# Patient Record
Sex: FEMALE | Race: WHITE | NOT HISPANIC OR LATINO | Employment: PART TIME | URBAN - METROPOLITAN AREA
[De-identification: names, ages, dates, MRNs, and addresses within clinical notes are randomized per-mention and may not be internally consistent; named-entity substitution may affect disease eponyms.]

---

## 2017-07-06 ENCOUNTER — TRANSCRIBE ORDERS (OUTPATIENT)
Dept: ADMINISTRATIVE | Facility: HOSPITAL | Age: 72
End: 2017-07-06

## 2017-07-06 DIAGNOSIS — Z12.31 ENCOUNTER FOR MAMMOGRAM TO ESTABLISH BASELINE MAMMOGRAM: Primary | ICD-10-CM

## 2017-07-10 ENCOUNTER — HOSPITAL ENCOUNTER (OUTPATIENT)
Dept: MAMMOGRAPHY | Facility: HOSPITAL | Age: 72
Discharge: HOME | End: 2017-07-10
Payer: MEDICARE

## 2017-07-10 DIAGNOSIS — Z12.31 ENCOUNTER FOR MAMMOGRAM TO ESTABLISH BASELINE MAMMOGRAM: ICD-10-CM

## 2017-07-10 PROCEDURE — G0202 SCR MAMMO BI INCL CAD: HCPCS

## 2018-06-21 ENCOUNTER — TRANSCRIBE ORDERS (OUTPATIENT)
Dept: ADMINISTRATIVE | Facility: HOSPITAL | Age: 73
End: 2018-06-21

## 2018-06-21 DIAGNOSIS — Z12.39 SCREENING BREAST EXAMINATION: Primary | ICD-10-CM

## 2018-07-12 ENCOUNTER — HOSPITAL ENCOUNTER (OUTPATIENT)
Dept: MAMMOGRAPHY | Facility: HOSPITAL | Age: 73
Discharge: HOME | End: 2018-07-12
Payer: MEDICARE

## 2018-07-12 DIAGNOSIS — Z12.39 SCREENING BREAST EXAMINATION: ICD-10-CM

## 2018-07-12 PROCEDURE — 77067 SCR MAMMO BI INCL CAD: CPT

## 2018-10-01 ENCOUNTER — APPOINTMENT (OUTPATIENT)
Dept: LAB | Facility: HOSPITAL | Age: 73
End: 2018-10-01
Payer: MEDICARE

## 2018-10-01 ENCOUNTER — TRANSCRIBE ORDERS (OUTPATIENT)
Dept: ADMINISTRATIVE | Facility: HOSPITAL | Age: 73
End: 2018-10-01

## 2018-10-01 DIAGNOSIS — E03.8 HYPOTHYROIDISM DUE TO FIBROUS INVASIVE THYROIDITIS: Primary | ICD-10-CM

## 2018-10-01 DIAGNOSIS — E78.5 HYPERLIPIDEMIA, UNSPECIFIED HYPERLIPIDEMIA TYPE: ICD-10-CM

## 2018-10-01 DIAGNOSIS — I10 ESSENTIAL HYPERTENSION, MALIGNANT: ICD-10-CM

## 2018-10-01 DIAGNOSIS — M54.5 LOW BACK PAIN, UNSPECIFIED BACK PAIN LATERALITY, UNSPECIFIED CHRONICITY, WITH SCIATICA PRESENCE UNSPECIFIED: ICD-10-CM

## 2018-10-01 DIAGNOSIS — F32.A VASCULAR DEMENTIA WITH DEPRESSED MOOD (HCC): ICD-10-CM

## 2018-10-01 DIAGNOSIS — E06.5 HYPOTHYROIDISM DUE TO FIBROUS INVASIVE THYROIDITIS: Primary | ICD-10-CM

## 2018-10-01 DIAGNOSIS — F01.50 VASCULAR DEMENTIA WITH DEPRESSED MOOD (HCC): ICD-10-CM

## 2018-10-01 DIAGNOSIS — E55.9 VITAMIN D DEFICIENCY DISEASE: ICD-10-CM

## 2018-10-01 DIAGNOSIS — E03.8 HYPOTHYROIDISM DUE TO FIBROUS INVASIVE THYROIDITIS: ICD-10-CM

## 2018-10-01 DIAGNOSIS — E06.5 HYPOTHYROIDISM DUE TO FIBROUS INVASIVE THYROIDITIS: ICD-10-CM

## 2018-10-01 LAB
25(OH)D3 SERPL-MCNC: 30.6 NG/ML (ref 30–100)
ALBUMIN SERPL BCP-MCNC: 3.8 G/DL (ref 3–5.2)
ALP SERPL-CCNC: 47 U/L (ref 43–122)
ALT SERPL W P-5'-P-CCNC: 22 U/L (ref 9–52)
ANION GAP SERPL CALCULATED.3IONS-SCNC: 5 MMOL/L (ref 5–14)
AST SERPL W P-5'-P-CCNC: 22 U/L (ref 14–36)
BACTERIA UR QL AUTO: ABNORMAL /HPF
BASOPHILS # BLD AUTO: 0 THOUSANDS/ΜL (ref 0–0.1)
BASOPHILS NFR BLD AUTO: 1 % (ref 0–1)
BILIRUB SERPL-MCNC: 1.3 MG/DL
BILIRUB UR QL STRIP: NEGATIVE
BUN SERPL-MCNC: 26 MG/DL (ref 5–25)
CALCIUM SERPL-MCNC: 8.8 MG/DL (ref 8.4–10.2)
CHLORIDE SERPL-SCNC: 107 MMOL/L (ref 97–108)
CHOLEST SERPL-MCNC: 242 MG/DL
CLARITY UR: ABNORMAL
CO2 SERPL-SCNC: 28 MMOL/L (ref 22–30)
COLOR UR: YELLOW
CREAT SERPL-MCNC: 0.68 MG/DL (ref 0.6–1.2)
EOSINOPHIL # BLD AUTO: 0.2 THOUSAND/ΜL (ref 0–0.4)
EOSINOPHIL NFR BLD AUTO: 4 % (ref 0–6)
ERYTHROCYTE [DISTWIDTH] IN BLOOD BY AUTOMATED COUNT: 13.1 %
GFR SERPL CREATININE-BSD FRML MDRD: 86 ML/MIN/1.73SQ M
GGT SERPL-CCNC: 16 U/L (ref 5–85)
GLUCOSE P FAST SERPL-MCNC: 87 MG/DL (ref 70–99)
GLUCOSE UR STRIP-MCNC: NEGATIVE MG/DL
HCT VFR BLD AUTO: 41.7 % (ref 36–46)
HDLC SERPL-MCNC: 64 MG/DL (ref 40–59)
HGB BLD-MCNC: 13.9 G/DL (ref 12–16)
HGB UR QL STRIP.AUTO: 50
KETONES UR STRIP-MCNC: NEGATIVE MG/DL
LDLC SERPL CALC-MCNC: 161 MG/DL
LEUKOCYTE ESTERASE UR QL STRIP: 500
LYMPHOCYTES # BLD AUTO: 1.7 THOUSANDS/ΜL (ref 0.5–4)
LYMPHOCYTES NFR BLD AUTO: 35 % (ref 20–50)
MCH RBC QN AUTO: 32.5 PG (ref 26–34)
MCHC RBC AUTO-ENTMCNC: 33.3 G/DL (ref 31–36)
MCV RBC AUTO: 98 FL (ref 80–100)
MONOCYTES # BLD AUTO: 0.5 THOUSAND/ΜL (ref 0.2–0.9)
MONOCYTES NFR BLD AUTO: 11 % (ref 1–10)
MUCOUS THREADS UR QL AUTO: ABNORMAL
NEUTROPHILS # BLD AUTO: 2.4 THOUSANDS/ΜL (ref 1.8–7.8)
NEUTS SEG NFR BLD AUTO: 49 % (ref 45–65)
NITRITE UR QL STRIP: NEGATIVE
NON-SQ EPI CELLS URNS QL MICRO: ABNORMAL /HPF
NONHDLC SERPL-MCNC: 178 MG/DL
PH UR STRIP.AUTO: 5 [PH] (ref 4.5–8)
PLATELET # BLD AUTO: 224 THOUSANDS/UL (ref 150–450)
PMV BLD AUTO: 9.5 FL (ref 8.9–12.7)
POTASSIUM SERPL-SCNC: 4.3 MMOL/L (ref 3.6–5)
PROT SERPL-MCNC: 6.7 G/DL (ref 5.9–8.4)
PROT UR STRIP-MCNC: ABNORMAL MG/DL
RBC # BLD AUTO: 4.27 MILLION/UL (ref 4–5.2)
RBC #/AREA URNS AUTO: ABNORMAL /HPF
SODIUM SERPL-SCNC: 140 MMOL/L (ref 137–147)
SP GR UR STRIP.AUTO: 1.02 (ref 1–1.04)
T4 FREE SERPL-MCNC: 1.11 NG/DL (ref 0.76–1.46)
TRIGL SERPL-MCNC: 83 MG/DL
TSH SERPL DL<=0.05 MIU/L-ACNC: 1.51 UIU/ML (ref 0.47–4.68)
UROBILINOGEN UA: NEGATIVE MG/DL
WBC # BLD AUTO: 4.8 THOUSAND/UL (ref 4.5–11)
WBC #/AREA URNS AUTO: ABNORMAL /HPF

## 2018-10-01 PROCEDURE — 85025 COMPLETE CBC W/AUTO DIFF WBC: CPT

## 2018-10-01 PROCEDURE — 84443 ASSAY THYROID STIM HORMONE: CPT

## 2018-10-01 PROCEDURE — 80053 COMPREHEN METABOLIC PANEL: CPT

## 2018-10-01 PROCEDURE — 80061 LIPID PANEL: CPT

## 2018-10-01 PROCEDURE — 84439 ASSAY OF FREE THYROXINE: CPT

## 2018-10-01 PROCEDURE — 82977 ASSAY OF GGT: CPT

## 2018-10-01 PROCEDURE — 82306 VITAMIN D 25 HYDROXY: CPT

## 2018-10-01 PROCEDURE — 36415 COLL VENOUS BLD VENIPUNCTURE: CPT

## 2018-10-01 PROCEDURE — 81001 URINALYSIS AUTO W/SCOPE: CPT

## 2018-10-30 ENCOUNTER — OFFICE VISIT (OUTPATIENT)
Dept: FAMILY MEDICINE CLINIC | Facility: CLINIC | Age: 73
End: 2018-10-30
Payer: MEDICARE

## 2018-10-30 VITALS
TEMPERATURE: 96 F | OXYGEN SATURATION: 95 % | SYSTOLIC BLOOD PRESSURE: 142 MMHG | DIASTOLIC BLOOD PRESSURE: 88 MMHG | RESPIRATION RATE: 15 BRPM | WEIGHT: 156 LBS | HEART RATE: 89 BPM

## 2018-10-30 DIAGNOSIS — Z23 ENCOUNTER FOR IMMUNIZATION: ICD-10-CM

## 2018-10-30 DIAGNOSIS — I10 ESSENTIAL HYPERTENSION: ICD-10-CM

## 2018-10-30 DIAGNOSIS — E03.9 ACQUIRED HYPOTHYROIDISM: ICD-10-CM

## 2018-10-30 DIAGNOSIS — A49.9 BACTERIAL URINARY INFECTION: Primary | ICD-10-CM

## 2018-10-30 DIAGNOSIS — G56.03 BILATERAL CARPAL TUNNEL SYNDROME: ICD-10-CM

## 2018-10-30 DIAGNOSIS — E78.2 MIXED HYPERLIPIDEMIA: ICD-10-CM

## 2018-10-30 DIAGNOSIS — F32.89 OTHER DEPRESSION: ICD-10-CM

## 2018-10-30 DIAGNOSIS — N39.0 BACTERIAL URINARY INFECTION: Primary | ICD-10-CM

## 2018-10-30 LAB
BACTERIA UR QL AUTO: ABNORMAL /HPF
BILIRUB UR QL STRIP: NEGATIVE
CLARITY UR: ABNORMAL
COLOR UR: YELLOW
GLUCOSE UR STRIP-MCNC: NEGATIVE MG/DL
HGB UR QL STRIP.AUTO: NEGATIVE
HYALINE CASTS #/AREA URNS LPF: ABNORMAL /LPF
KETONES UR STRIP-MCNC: NEGATIVE MG/DL
LEUKOCYTE ESTERASE UR QL STRIP: ABNORMAL
NITRITE UR QL STRIP: NEGATIVE
NON-SQ EPI CELLS URNS QL MICRO: ABNORMAL /HPF
PH UR STRIP.AUTO: 7 [PH] (ref 4.5–8)
PROT UR STRIP-MCNC: NEGATIVE MG/DL
RBC #/AREA URNS AUTO: ABNORMAL /HPF
SP GR UR STRIP.AUTO: 1.02 (ref 1–1.03)
UROBILINOGEN UR QL STRIP.AUTO: 1 E.U./DL
WBC #/AREA URNS AUTO: ABNORMAL /HPF

## 2018-10-30 PROCEDURE — G0008 ADMIN INFLUENZA VIRUS VAC: HCPCS

## 2018-10-30 PROCEDURE — 99214 OFFICE O/P EST MOD 30 MIN: CPT | Performed by: SPECIALIST

## 2018-10-30 PROCEDURE — 81001 URINALYSIS AUTO W/SCOPE: CPT | Performed by: SPECIALIST

## 2018-10-30 PROCEDURE — 90662 IIV NO PRSV INCREASED AG IM: CPT

## 2018-10-30 RX ORDER — ATORVASTATIN CALCIUM 10 MG/1
10 TABLET, FILM COATED ORAL
Qty: 90 TABLET | Refills: 3 | Status: SHIPPED | OUTPATIENT
Start: 2018-10-30 | End: 2018-11-21 | Stop reason: SDUPTHER

## 2018-10-30 RX ORDER — MELATONIN
1000 DAILY
COMMUNITY
End: 2021-08-06

## 2018-10-30 RX ORDER — HYDROCODONE BITARTRATE AND ACETAMINOPHEN 5; 325 MG/1; MG/1
1 TABLET ORAL
Refills: 0 | COMMUNITY
Start: 2018-10-17 | End: 2018-10-30 | Stop reason: ALTCHOICE

## 2018-10-30 NOTE — PROGRESS NOTES
Assessment/Plan:      73 Yo  Female  Feels  Well    Sees  Dentist       Had  Recent  Pelvic   Mammogram    Had  jen Salcido  Dermatologist       Flu  Vaccine  Today    Needs  prevnar   13  Next  Visit    Had  Pneumovax  23     Had  bilat  Carpal   Tunnel  Or   Gets  Pt  Left  Side   Most  Recent  Or      Has  Glaucoma  Right   Eye  Drops    Left   Wet  Macular  Degeneration   Gets  Injections            Diagnoses and all orders for this visit:    Essential hypertension    Encounter for immunization  -     influenza vaccine, [id number], high-dose, PF 0 5 mL, for patients 65 yr+ (FLUZONE HIGH-DOSE)    Other depression    Acquired hypothyroidism    Mixed hyperlipidemia  -     atorvastatin (LIPITOR) 10 mg tablet; Take 1 tablet (10 mg total) by mouth daily at bedtime    Bacterial urinary infection  -     UA w Reflex to Microscopic w Reflex to Culture -Lab Collect    Other orders  -     Discontinue: HYDROcodone-acetaminophen (NORCO) 5-325 mg per tablet; Take 1 tablet by mouth  -     Probiotic Product (PROBIOTIC-10 PO); Take by mouth  -     cholecalciferol (VITAMIN D3) 1,000 units tablet; Take 1,000 Units by mouth daily          Subjective:      Patient ID: Mayra Russell is a 74 y o  female  73 Yo  Female  Feels  Well    Check  Up  bp  Ok  Lab  Reviewed      Flu  Vaccine  Today           Needs prevnar  13        The following portions of the patient's history were reviewed and updated as appropriate: allergies, current medications, past family history, past medical history, past social history, past surgical history and problem list     Review of Systems   Constitutional: Negative for activity change, appetite change, chills, diaphoresis, fatigue and fever  HENT: Negative for dental problem, hearing loss, sinus pain and voice change  Eyes: Positive for visual disturbance  Respiratory: Negative for cough, chest tightness, shortness of breath and wheezing      Cardiovascular: Negative for chest pain, palpitations and leg swelling  Gastrointestinal: Negative for abdominal distention and abdominal pain  Genitourinary: Negative for difficulty urinating  Musculoskeletal: Negative for arthralgias, back pain, gait problem, joint swelling, myalgias and neck pain  Skin: Negative  Neurological: Negative for dizziness, tremors, seizures, syncope, facial asymmetry, speech difficulty, weakness, light-headedness, numbness and headaches  Psychiatric/Behavioral: Negative for agitation, behavioral problems and dysphoric mood  The patient is not nervous/anxious  Objective:      /88 (BP Location: Right arm, Patient Position: Sitting, Cuff Size: Adult)   Pulse 89   Temp (!) 96 °F (35 6 °C) (Tympanic)   Resp 15   Wt 70 8 kg (156 lb)   SpO2 95%          Physical Exam   Constitutional: She is oriented to person, place, and time  She appears well-developed and well-nourished  No distress  HENT:   Head: Normocephalic  Right Ear: External ear normal    Left Ear: External ear normal    Mouth/Throat: Oropharynx is clear and moist    Eyes: Pupils are equal, round, and reactive to light  Conjunctivae and EOM are normal  No scleral icterus  Bilateral  Cataracts      Neck: No JVD present  Cardiovascular: Normal rate, regular rhythm and intact distal pulses  Exam reveals no gallop and no friction rub  No murmur heard  Pulmonary/Chest: Breath sounds normal  She has no wheezes  She has no rales  Abdominal: Bowel sounds are normal  She exhibits no distension  Musculoskeletal: She exhibits no edema, tenderness or deformity  Neurological: She is alert and oriented to person, place, and time  Coordination normal    Skin: Skin is warm and dry  No rash noted  She is not diaphoretic  No erythema  No pallor  Psychiatric: She has a normal mood and affect   Her behavior is normal  Thought content normal

## 2018-10-30 NOTE — PATIENT INSTRUCTIONS
Had  Flu  Vaccine  Today    Needs  prevnar   13   Pneumonia  vaccine  Next  visit      See  Me  6  Mos    Try  Atorvastatin  For  Your  Elevated  Cholesterol    Do  A  Urine  Culture       Check  Lab  Prior  To  Next  visit

## 2018-11-05 ENCOUNTER — TELEPHONE (OUTPATIENT)
Dept: FAMILY MEDICINE CLINIC | Facility: CLINIC | Age: 73
End: 2018-11-05

## 2018-11-21 ENCOUNTER — TELEPHONE (OUTPATIENT)
Dept: OTHER | Facility: OTHER | Age: 73
End: 2018-11-21

## 2018-11-21 ENCOUNTER — OFFICE VISIT (OUTPATIENT)
Dept: FAMILY MEDICINE CLINIC | Facility: CLINIC | Age: 73
End: 2018-11-21
Payer: MEDICARE

## 2018-11-21 VITALS
RESPIRATION RATE: 16 BRPM | SYSTOLIC BLOOD PRESSURE: 148 MMHG | WEIGHT: 155 LBS | OXYGEN SATURATION: 98 % | DIASTOLIC BLOOD PRESSURE: 90 MMHG | HEART RATE: 119 BPM

## 2018-11-21 DIAGNOSIS — F41.9 ANXIETY: ICD-10-CM

## 2018-11-21 DIAGNOSIS — E78.2 MIXED HYPERLIPIDEMIA: ICD-10-CM

## 2018-11-21 DIAGNOSIS — F32.A DEPRESSION, UNSPECIFIED DEPRESSION TYPE: Primary | ICD-10-CM

## 2018-11-21 PROCEDURE — 99212 OFFICE O/P EST SF 10 MIN: CPT | Performed by: SPECIALIST

## 2018-11-21 RX ORDER — LORAZEPAM 0.5 MG/1
0.5 TABLET ORAL 2 TIMES DAILY PRN
Qty: 50 TABLET | Refills: 0 | Status: SHIPPED | OUTPATIENT
Start: 2018-11-21 | End: 2018-11-26 | Stop reason: SDUPTHER

## 2018-11-21 RX ORDER — RANIBIZUMAB 6 MG/ML
0.3 INJECTION, SOLUTION INTRAVITREAL ONCE
COMMUNITY

## 2018-11-21 RX ORDER — VENLAFAXINE HYDROCHLORIDE 75 MG/1
75 CAPSULE, EXTENDED RELEASE ORAL DAILY
Qty: 90 CAPSULE | Refills: 2 | Status: SHIPPED | OUTPATIENT
Start: 2018-11-21 | End: 2018-11-21 | Stop reason: SDUPTHER

## 2018-11-21 RX ORDER — VENLAFAXINE HYDROCHLORIDE 75 MG/1
75 CAPSULE, EXTENDED RELEASE ORAL DAILY
Qty: 90 CAPSULE | Refills: 2 | Status: SHIPPED | OUTPATIENT
Start: 2018-11-21 | End: 2018-11-26 | Stop reason: SDUPTHER

## 2018-11-21 RX ORDER — ATORVASTATIN CALCIUM 10 MG/1
10 TABLET, FILM COATED ORAL
Qty: 90 TABLET | Refills: 3 | Status: SHIPPED | OUTPATIENT
Start: 2018-11-21 | End: 2018-11-26 | Stop reason: SDUPTHER

## 2018-11-21 NOTE — PROGRESS NOTES
Assessment/Plan:  Pt  Has  Increased  Anxiety   Daja  At holidays   Underlying  Depression    Increase the  effexor  xr  75  /  Daily       Add  ativam  0 5 bid   Prn         Diagnoses and all orders for this visit:    Depression, unspecified depression type  -     venlafaxine (EFFEXOR-XR) 75 mg 24 hr capsule; Take 1 capsule (75 mg total) by mouth daily  -     LORazepam (ATIVAN) 0 5 mg tablet; Take 1 tablet (0 5 mg total) by mouth 2 (two) times a day as needed for anxiety    Anxiety  -     LORazepam (ATIVAN) 0 5 mg tablet; Take 1 tablet (0 5 mg total) by mouth 2 (two) times a day as needed for anxiety    Other orders  -     ranibizumab (LUCENTIS) 0 3 mg/0 05mL; 0 3 mg by Intravitreal route once          Subjective:      Patient ID: Serenity Drew is a 74 y o  female  [address] [place] [address]  Coming  Up  meds  adjusted      Anxiety   Symptoms include nervous/anxious behavior  Patient reports no chest pain, dizziness, palpitations or shortness of breath  The following portions of the patient's history were reviewed and updated as appropriate: allergies, current medications, past family history, past medical history, past social history, past surgical history and problem list     Review of Systems   Constitutional: Negative for activity change, appetite change, chills, diaphoresis, fatigue and fever  HENT: Negative for voice change  Eyes: Negative for visual disturbance  Respiratory: Positive for cough  Negative for chest tightness, shortness of breath and wheezing  Cardiovascular: Negative for chest pain, palpitations and leg swelling  Gastrointestinal: Negative for abdominal pain  Genitourinary: Negative for difficulty urinating  Musculoskeletal: Negative for arthralgias and gait problem  Neurological: Negative for dizziness, tremors, seizures, syncope, facial asymmetry, speech difficulty, weakness, light-headedness, numbness and headaches  Psychiatric/Behavioral: The patient is nervous/anxious  Objective:      /90 (BP Location: Right arm, Patient Position: Sitting, Cuff Size: Adult)   Pulse (!) 119   Resp 16   Wt 70 3 kg (155 lb)   SpO2 98%          Physical Exam   Constitutional: She is oriented to person, place, and time  She appears well-developed and well-nourished  No distress  Eyes: No scleral icterus  Neck: No JVD present  Cardiovascular: Normal rate, regular rhythm and normal heart sounds  No murmur heard  Pulmonary/Chest: No respiratory distress  She has no wheezes  She has no rales  Abdominal: She exhibits no distension  Neurological: She is alert and oriented to person, place, and time  Skin: She is not diaphoretic     Psychiatric:   anxious

## 2018-11-21 NOTE — PATIENT INSTRUCTIONS
Increase  The  effexor  xr  Back   To  75  Mg  And  Add  Ativan  0 5  Mg  2  Times  A  Day  If  Needed

## 2018-11-26 DIAGNOSIS — F32.A DEPRESSION, UNSPECIFIED DEPRESSION TYPE: ICD-10-CM

## 2018-11-26 DIAGNOSIS — F41.9 ANXIETY: ICD-10-CM

## 2018-11-26 DIAGNOSIS — E78.2 MIXED HYPERLIPIDEMIA: ICD-10-CM

## 2018-11-26 RX ORDER — VENLAFAXINE HYDROCHLORIDE 75 MG/1
75 CAPSULE, EXTENDED RELEASE ORAL DAILY
Qty: 90 CAPSULE | Refills: 2 | Status: SHIPPED | OUTPATIENT
Start: 2018-11-26 | End: 2019-08-08 | Stop reason: SDUPTHER

## 2018-11-26 RX ORDER — LORAZEPAM 0.5 MG/1
0.5 TABLET ORAL 2 TIMES DAILY PRN
Qty: 60 TABLET | Refills: 0 | Status: SHIPPED | OUTPATIENT
Start: 2018-11-26

## 2018-11-26 RX ORDER — ATORVASTATIN CALCIUM 10 MG/1
10 TABLET, FILM COATED ORAL
Qty: 90 TABLET | Refills: 2 | Status: SHIPPED | OUTPATIENT
Start: 2018-11-26 | End: 2020-06-15

## 2019-02-11 ENCOUNTER — TRANSCRIBE ORDERS (OUTPATIENT)
Dept: ADMINISTRATIVE | Facility: HOSPITAL | Age: 74
End: 2019-02-11

## 2019-02-11 ENCOUNTER — APPOINTMENT (OUTPATIENT)
Dept: LAB | Facility: HOSPITAL | Age: 74
End: 2019-02-11
Payer: MEDICARE

## 2019-02-11 DIAGNOSIS — G56.03 CARPAL TUNNEL SYNDROME, BILATERAL: Primary | ICD-10-CM

## 2019-02-11 DIAGNOSIS — F32.89 OTHER DEPRESSION: ICD-10-CM

## 2019-02-11 DIAGNOSIS — E03.9 ACQUIRED HYPOTHYROIDISM: ICD-10-CM

## 2019-02-11 DIAGNOSIS — I10 ESSENTIAL HYPERTENSION: ICD-10-CM

## 2019-02-11 DIAGNOSIS — Z23 ENCOUNTER FOR IMMUNIZATION: ICD-10-CM

## 2019-02-11 DIAGNOSIS — N39.0 BACTERIAL URINARY INFECTION: ICD-10-CM

## 2019-02-11 DIAGNOSIS — A49.9 BACTERIAL URINARY INFECTION: ICD-10-CM

## 2019-02-11 DIAGNOSIS — E78.2 MIXED HYPERLIPIDEMIA: ICD-10-CM

## 2019-02-11 DIAGNOSIS — G56.03 BILATERAL CARPAL TUNNEL SYNDROME: ICD-10-CM

## 2019-02-11 LAB
ALBUMIN SERPL BCP-MCNC: 4 G/DL (ref 3–5.2)
ALP SERPL-CCNC: 54 U/L (ref 43–122)
ALT SERPL W P-5'-P-CCNC: 22 U/L (ref 9–52)
ANION GAP SERPL CALCULATED.3IONS-SCNC: 5 MMOL/L (ref 5–14)
AST SERPL W P-5'-P-CCNC: 21 U/L (ref 14–36)
BACTERIA UR QL AUTO: ABNORMAL /HPF
BASOPHILS # BLD AUTO: 0 THOUSANDS/ΜL (ref 0–0.1)
BASOPHILS NFR BLD AUTO: 1 % (ref 0–1)
BILIRUB SERPL-MCNC: 0.7 MG/DL
BILIRUB UR QL STRIP: NEGATIVE
BUN SERPL-MCNC: 20 MG/DL (ref 5–25)
CALCIUM SERPL-MCNC: 9.2 MG/DL (ref 8.4–10.2)
CHLORIDE SERPL-SCNC: 105 MMOL/L (ref 97–108)
CHOLEST SERPL-MCNC: 196 MG/DL
CLARITY UR: ABNORMAL
CO2 SERPL-SCNC: 28 MMOL/L (ref 22–30)
COLOR UR: ABNORMAL
CREAT SERPL-MCNC: 0.67 MG/DL (ref 0.6–1.2)
EOSINOPHIL # BLD AUTO: 0.2 THOUSAND/ΜL (ref 0–0.4)
EOSINOPHIL NFR BLD AUTO: 4 % (ref 0–6)
ERYTHROCYTE [DISTWIDTH] IN BLOOD BY AUTOMATED COUNT: 13 %
GFR SERPL CREATININE-BSD FRML MDRD: 86 ML/MIN/1.73SQ M
GGT SERPL-CCNC: 15 U/L (ref 5–85)
GLUCOSE P FAST SERPL-MCNC: 86 MG/DL (ref 70–99)
GLUCOSE UR STRIP-MCNC: NEGATIVE MG/DL
HCT VFR BLD AUTO: 41.7 % (ref 36–46)
HDLC SERPL-MCNC: 74 MG/DL (ref 40–59)
HGB BLD-MCNC: 13.7 G/DL (ref 12–16)
HGB UR QL STRIP.AUTO: NEGATIVE
KETONES UR STRIP-MCNC: NEGATIVE MG/DL
LDLC SERPL CALC-MCNC: 101 MG/DL
LEUKOCYTE ESTERASE UR QL STRIP: 500
LYMPHOCYTES # BLD AUTO: 1.5 THOUSANDS/ΜL (ref 0.5–4)
LYMPHOCYTES NFR BLD AUTO: 30 % (ref 25–45)
MCH RBC QN AUTO: 31.6 PG (ref 26–34)
MCHC RBC AUTO-ENTMCNC: 32.8 G/DL (ref 31–36)
MCV RBC AUTO: 96 FL (ref 80–100)
MONOCYTES # BLD AUTO: 0.5 THOUSAND/ΜL (ref 0.2–0.9)
MONOCYTES NFR BLD AUTO: 11 % (ref 1–10)
MUCOUS THREADS UR QL AUTO: ABNORMAL
NEUTROPHILS # BLD AUTO: 2.7 THOUSANDS/ΜL (ref 1.8–7.8)
NEUTS SEG NFR BLD AUTO: 55 % (ref 45–65)
NITRITE UR QL STRIP: NEGATIVE
NON-SQ EPI CELLS URNS QL MICRO: ABNORMAL /HPF
NONHDLC SERPL-MCNC: 122 MG/DL
PH UR STRIP.AUTO: 6.5 [PH] (ref 4.5–8)
PLATELET # BLD AUTO: 248 THOUSANDS/UL (ref 150–450)
PMV BLD AUTO: 9.3 FL (ref 8.9–12.7)
POTASSIUM SERPL-SCNC: 4.3 MMOL/L (ref 3.6–5)
PROT SERPL-MCNC: 7 G/DL (ref 5.9–8.4)
PROT UR STRIP-MCNC: NEGATIVE MG/DL
RBC # BLD AUTO: 4.33 MILLION/UL (ref 4–5.2)
RBC #/AREA URNS AUTO: ABNORMAL /HPF
SODIUM SERPL-SCNC: 138 MMOL/L (ref 137–147)
SP GR UR STRIP.AUTO: 1.01 (ref 1–1.04)
T4 FREE SERPL-MCNC: 1.33 NG/DL (ref 0.76–1.46)
TRIGL SERPL-MCNC: 103 MG/DL
TSH SERPL DL<=0.05 MIU/L-ACNC: 0.42 UIU/ML (ref 0.47–4.68)
UROBILINOGEN UA: NEGATIVE MG/DL
WBC # BLD AUTO: 4.9 THOUSAND/UL (ref 4.5–11)
WBC #/AREA URNS AUTO: ABNORMAL /HPF

## 2019-02-11 PROCEDURE — 81001 URINALYSIS AUTO W/SCOPE: CPT | Performed by: OBSTETRICS & GYNECOLOGY

## 2019-02-11 PROCEDURE — 80061 LIPID PANEL: CPT

## 2019-02-11 PROCEDURE — 84439 ASSAY OF FREE THYROXINE: CPT

## 2019-02-11 PROCEDURE — 84443 ASSAY THYROID STIM HORMONE: CPT

## 2019-02-11 PROCEDURE — 80053 COMPREHEN METABOLIC PANEL: CPT

## 2019-02-11 PROCEDURE — 82977 ASSAY OF GGT: CPT

## 2019-02-11 PROCEDURE — 36415 COLL VENOUS BLD VENIPUNCTURE: CPT

## 2019-02-11 PROCEDURE — 81003 URINALYSIS AUTO W/O SCOPE: CPT | Performed by: OBSTETRICS & GYNECOLOGY

## 2019-02-11 PROCEDURE — 85025 COMPLETE CBC W/AUTO DIFF WBC: CPT

## 2019-03-04 ENCOUNTER — HOSPITAL ENCOUNTER (OUTPATIENT)
Dept: RADIOLOGY | Facility: HOSPITAL | Age: 74
Discharge: HOME | End: 2019-03-04
Payer: MEDICARE

## 2019-03-04 ENCOUNTER — OFFICE VISIT (OUTPATIENT)
Dept: FAMILY MEDICINE CLINIC | Facility: CLINIC | Age: 74
End: 2019-03-04
Payer: MEDICARE

## 2019-03-04 VITALS
HEART RATE: 84 BPM | DIASTOLIC BLOOD PRESSURE: 82 MMHG | TEMPERATURE: 98.2 F | WEIGHT: 164 LBS | OXYGEN SATURATION: 99 % | SYSTOLIC BLOOD PRESSURE: 140 MMHG
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
## 2019-03-04 DIAGNOSIS — R07.81 RIB PAIN: ICD-10-CM

## 2019-03-04 DIAGNOSIS — I10 ESSENTIAL HYPERTENSION: ICD-10-CM

## 2019-03-04 DIAGNOSIS — H40.9 GLAUCOMA OF BOTH EYES, UNSPECIFIED GLAUCOMA TYPE: ICD-10-CM

## 2019-03-04 DIAGNOSIS — H26.9 CATARACT OF BOTH EYES, UNSPECIFIED CATARACT TYPE: ICD-10-CM

## 2019-03-04 DIAGNOSIS — R82.81 PYURIA: ICD-10-CM

## 2019-03-04 DIAGNOSIS — E03.9 ACQUIRED HYPOTHYROIDISM: ICD-10-CM

## 2019-03-04 DIAGNOSIS — H35.3290 EXUDATIVE AGE-RELATED MACULAR DEGENERATION, UNSPECIFIED LATERALITY, UNSPECIFIED STAGE (HCC): ICD-10-CM

## 2019-03-04 DIAGNOSIS — T14.90XA ACCIDENTAL INJURY: ICD-10-CM

## 2019-03-04 DIAGNOSIS — R07.81 RIB PAIN: Primary | ICD-10-CM

## 2019-03-04 DIAGNOSIS — E78.2 MIXED HYPERLIPIDEMIA: ICD-10-CM

## 2019-03-04 PROCEDURE — 71046 X-RAY EXAM CHEST 2 VIEWS: CPT

## 2019-03-04 PROCEDURE — 71110 X-RAY EXAM RIBS BIL 3 VIEWS: CPT

## 2019-03-04 PROCEDURE — 99214 OFFICE O/P EST MOD 30 MIN: CPT | Performed by: SPECIALIST

## 2019-03-04 NOTE — PROGRESS NOTES
Assessment/Plan:    Do  Lab       Do  Xray  Left  Ribs  R/o  Fracture                Diagnoses and all orders for this visit:    Acquired hypothyroidism  -     CBC and differential; Future  -     Comprehensive metabolic panel; Future  -     Lipid panel; Future  -     UA w Reflex to Microscopic w Reflex to Culture -Lab Collect  -     T4, free; Future  -     TSH, 3rd generation; Future    Pyuria  -     UA w Reflex to Microscopic w Reflex to Culture -Lab Collect  -     CBC and differential; Future  -     Comprehensive metabolic panel; Future  -     Lipid panel; Future  -     UA w Reflex to Microscopic w Reflex to Culture -Lab Collect  -     T4, free; Future  -     TSH, 3rd generation; Future    Mixed hyperlipidemia  -     CBC and differential; Future  -     Comprehensive metabolic panel; Future  -     Lipid panel; Future  -     UA w Reflex to Microscopic w Reflex to Culture -Lab Collect  -     T4, free; Future  -     TSH, 3rd generation; Future    Essential hypertension  -     CBC and differential; Future  -     Comprehensive metabolic panel; Future  -     Lipid panel; Future  -     UA w Reflex to Microscopic w Reflex to Culture -Lab Collect  -     T4, free; Future  -     TSH, 3rd generation; Future    Exudative age-related macular degeneration, unspecified laterality, unspecified stage (HCC)  -     CBC and differential; Future  -     Comprehensive metabolic panel; Future  -     Lipid panel; Future  -     UA w Reflex to Microscopic w Reflex to Culture -Lab Collect  -     T4, free; Future  -     TSH, 3rd generation;  Future    Fall, initial encounter  -     XR chest pa & lateral; Future  -     XR ribs bilateral 3 vw; Future    Rib pain  -     XR chest pa & lateral; Future  -     XR ribs bilateral 3 vw; Future    Glaucoma of both eyes, unspecified glaucoma type    Cataract of both eyes, unspecified cataract type    Accidental injury    Other orders  -     Cancel: Zoster Vaccine Recombinant IM          Subjective: Patient ID: Cassi Bey is a 74 y o  female  73 Yo  Female  generallly  Well       2  Weeks  Ago  Fell  On  The  Ice   Injured  Left  Chest  Still  Tender   Yanely Has  Dr  for  Mac  Degeneration    Cataracts  And   Glaucoma      Lab  Reviewed      bp  Fine   Chol  Fine    Rest  Ok  As  Well         The following portions of the patient's history were reviewed and updated as appropriate: allergies, current medications, past family history, past medical history, past social history, past surgical history and problem list     Review of Systems   Constitutional: Negative for activity change, appetite change, chills, diaphoresis, fatigue and fever  HENT: Negative for congestion, sinus pressure, sinus pain and voice change  Eyes: Negative for visual disturbance  Respiratory: Negative for cough, chest tightness, shortness of breath and wheezing  Cardiovascular: Negative for chest pain, palpitations and leg swelling  Gastrointestinal: Positive for blood in stool  Negative for abdominal distention, abdominal pain, anal bleeding, constipation, diarrhea and nausea  Sees  Colon  Rectal  surgeon  For  Hemorrhoids  And  Colonoscopy      Genitourinary: Negative for difficulty urinating and dysuria  Musculoskeletal: Positive for arthralgias  Negative for back pain, gait problem, joint swelling and myalgias  Left  Elbow  Pain  And  Knee  Pains    Neurological: Negative for dizziness, tremors, seizures, syncope, facial asymmetry, speech difficulty, light-headedness, numbness and headaches  Psychiatric/Behavioral: Negative for agitation and behavioral problems  Objective:      /82   Pulse 84   Temp 98 2 °F (36 8 °C) (Tympanic)   Wt 74 4 kg (164 lb)   SpO2 99%          Physical Exam   Constitutional: No distress  HENT:   Mouth/Throat: No oropharyngeal exudate  Eyes: Pupils are equal, round, and reactive to light   Conjunctivae and EOM are normal  No scleral icterus  Lens  cloudy   Neck: No JVD present  Cardiovascular: Normal rate, regular rhythm, normal heart sounds and intact distal pulses  No murmur heard  Pulmonary/Chest: Effort normal and breath sounds normal  No respiratory distress  She has no wheezes  She has no rales  Abdominal: Soft  Bowel sounds are normal    Musculoskeletal: She exhibits no edema or deformity  Skin: Skin is warm and dry  She is not diaphoretic  Psychiatric: She has a normal mood and affect   Her behavior is normal

## 2019-03-05 ENCOUNTER — TELEPHONE (OUTPATIENT)
Dept: OTHER | Facility: OTHER | Age: 74
End: 2019-03-05

## 2019-03-06 ENCOUNTER — TELEPHONE (OUTPATIENT)
Dept: FAMILY MEDICINE CLINIC | Facility: CLINIC | Age: 74
End: 2019-03-06

## 2019-03-06 NOTE — TELEPHONE ENCOUNTER
Patient was just in for RIB pain  Patient has a couple questions  Patient wants to know if you recommend something other than what she is trying which is ibuprofen  Patient also wants to know when she needs to come next for appointment  She also said she was told to come in to have urine check but wasn't sure when to come   Please advise

## 2019-03-18 DIAGNOSIS — E03.9 HYPOTHYROIDISM, UNSPECIFIED TYPE: Primary | ICD-10-CM

## 2019-03-18 RX ORDER — LEVOTHYROXINE SODIUM 112 UG/1
112 TABLET ORAL DAILY
Qty: 90 TABLET | Refills: 0 | Status: SHIPPED | OUTPATIENT
Start: 2019-03-18 | End: 2019-08-12 | Stop reason: SDUPTHER

## 2019-03-18 NOTE — TELEPHONE ENCOUNTER
Patient called and stated that she is having an issue getting a refill for medication levothyroxine (SYNTHROID) 112 mcg tablet- take 1 tablet by mouth daily #90    She would like this called into [address] [phone]

## 2019-06-14 DIAGNOSIS — I10 HYPERTENSION, UNSPECIFIED TYPE: Primary | ICD-10-CM

## 2019-06-14 RX ORDER — LOSARTAN POTASSIUM 50 MG/1
50 TABLET ORAL DAILY
Qty: 90 TABLET | Refills: 1 | Status: SHIPPED | OUTPATIENT
Start: 2019-06-14 | End: 2019-11-18 | Stop reason: SDUPTHER

## 2019-06-14 NOTE — TELEPHONE ENCOUNTER
Patient called and requested a medication refill for losartan (COZAAR) 50 mg tablet-take 1 (50 mg total) by mouth daily #90   She would like this called into [facility] [phone]

## 2019-06-19 ENCOUNTER — TELEPHONE (OUTPATIENT)
Dept: FAMILY MEDICINE CLINIC | Facility: CLINIC | Age: 74
End: 2019-06-19

## 2019-07-01 ENCOUNTER — TRANSCRIBE ORDERS (OUTPATIENT)
Dept: ADMINISTRATIVE | Facility: HOSPITAL | Age: 74
End: 2019-07-01

## 2019-07-01 DIAGNOSIS — Z12.39 BREAST SCREENING: Primary | ICD-10-CM

## 2019-07-15 ENCOUNTER — HOSPITAL ENCOUNTER (OUTPATIENT)
Dept: MAMMOGRAPHY | Facility: HOSPITAL | Age: 74
Discharge: HOME | End: 2019-07-15
Payer: MEDICARE

## 2019-07-15 VITALS — WEIGHT: 160 LBS | HEIGHT: 64 IN | BODY MASS INDEX: 27.31 KG/M2

## 2019-07-15 DIAGNOSIS — Z12.39 BREAST SCREENING: ICD-10-CM

## 2019-07-15 PROCEDURE — 77067 SCR MAMMO BI INCL CAD: CPT

## 2019-08-05 ENCOUNTER — APPOINTMENT (OUTPATIENT)
Dept: LAB | Facility: HOSPITAL | Age: 74
End: 2019-08-05
Payer: MEDICARE

## 2019-08-05 DIAGNOSIS — H35.3290 EXUDATIVE AGE-RELATED MACULAR DEGENERATION, UNSPECIFIED LATERALITY, UNSPECIFIED STAGE (HCC): ICD-10-CM

## 2019-08-05 DIAGNOSIS — E03.9 ACQUIRED HYPOTHYROIDISM: ICD-10-CM

## 2019-08-05 DIAGNOSIS — R82.81 PYURIA: ICD-10-CM

## 2019-08-05 DIAGNOSIS — I10 ESSENTIAL HYPERTENSION: ICD-10-CM

## 2019-08-05 DIAGNOSIS — E78.2 MIXED HYPERLIPIDEMIA: ICD-10-CM

## 2019-08-05 LAB
ALBUMIN SERPL BCP-MCNC: 4 G/DL (ref 3–5.2)
ALP SERPL-CCNC: 57 U/L (ref 43–122)
ALT SERPL W P-5'-P-CCNC: 22 U/L (ref 9–52)
ANION GAP SERPL CALCULATED.3IONS-SCNC: 6 MMOL/L (ref 5–14)
AST SERPL W P-5'-P-CCNC: 28 U/L (ref 14–36)
BASOPHILS # BLD AUTO: 0 THOUSANDS/ΜL (ref 0–0.1)
BASOPHILS NFR BLD AUTO: 1 % (ref 0–1)
BILIRUB SERPL-MCNC: 1.1 MG/DL
BUN SERPL-MCNC: 21 MG/DL (ref 5–25)
CALCIUM SERPL-MCNC: 9.2 MG/DL (ref 8.4–10.2)
CHLORIDE SERPL-SCNC: 104 MMOL/L (ref 97–108)
CHOLEST SERPL-MCNC: 215 MG/DL
CO2 SERPL-SCNC: 29 MMOL/L (ref 22–30)
CREAT SERPL-MCNC: 0.69 MG/DL (ref 0.6–1.2)
EOSINOPHIL # BLD AUTO: 0.2 THOUSAND/ΜL (ref 0–0.4)
EOSINOPHIL NFR BLD AUTO: 3 % (ref 0–6)
ERYTHROCYTE [DISTWIDTH] IN BLOOD BY AUTOMATED COUNT: 14 %
GFR SERPL CREATININE-BSD FRML MDRD: 85 ML/MIN/1.73SQ M
GLUCOSE P FAST SERPL-MCNC: 100 MG/DL (ref 70–99)
HCT VFR BLD AUTO: 43.3 % (ref 36–46)
HDLC SERPL-MCNC: 64 MG/DL (ref 40–59)
HGB BLD-MCNC: 14.5 G/DL (ref 12–16)
LDLC SERPL CALC-MCNC: 132 MG/DL
LYMPHOCYTES # BLD AUTO: 1.5 THOUSANDS/ΜL (ref 0.5–4)
LYMPHOCYTES NFR BLD AUTO: 31 % (ref 25–45)
MCH RBC QN AUTO: 31.2 PG (ref 26–34)
MCHC RBC AUTO-ENTMCNC: 33.5 G/DL (ref 31–36)
MCV RBC AUTO: 93 FL (ref 80–100)
MONOCYTES # BLD AUTO: 0.5 THOUSAND/ΜL (ref 0.2–0.9)
MONOCYTES NFR BLD AUTO: 11 % (ref 1–10)
NEUTROPHILS # BLD AUTO: 2.6 THOUSANDS/ΜL (ref 1.8–7.8)
NEUTS SEG NFR BLD AUTO: 54 % (ref 45–65)
NONHDLC SERPL-MCNC: 151 MG/DL
PLATELET # BLD AUTO: 229 THOUSANDS/UL (ref 150–450)
PMV BLD AUTO: 9.1 FL (ref 8.9–12.7)
POTASSIUM SERPL-SCNC: 4.2 MMOL/L (ref 3.6–5)
PROT SERPL-MCNC: 7.2 G/DL (ref 5.9–8.4)
RBC # BLD AUTO: 4.65 MILLION/UL (ref 4–5.2)
SODIUM SERPL-SCNC: 139 MMOL/L (ref 137–147)
T4 FREE SERPL-MCNC: 1.06 NG/DL (ref 0.76–1.46)
TRIGL SERPL-MCNC: 96 MG/DL
TSH SERPL DL<=0.05 MIU/L-ACNC: 0.3 UIU/ML (ref 0.47–4.68)
WBC # BLD AUTO: 4.8 THOUSAND/UL (ref 4.5–11)

## 2019-08-05 PROCEDURE — 85025 COMPLETE CBC W/AUTO DIFF WBC: CPT

## 2019-08-05 PROCEDURE — 80061 LIPID PANEL: CPT

## 2019-08-05 PROCEDURE — 81001 URINALYSIS AUTO W/SCOPE: CPT

## 2019-08-05 PROCEDURE — 84443 ASSAY THYROID STIM HORMONE: CPT

## 2019-08-05 PROCEDURE — 36415 COLL VENOUS BLD VENIPUNCTURE: CPT

## 2019-08-05 PROCEDURE — 84439 ASSAY OF FREE THYROXINE: CPT

## 2019-08-05 PROCEDURE — 80053 COMPREHEN METABOLIC PANEL: CPT

## 2019-08-08 DIAGNOSIS — F32.A DEPRESSION, UNSPECIFIED DEPRESSION TYPE: ICD-10-CM

## 2019-08-08 RX ORDER — VENLAFAXINE HYDROCHLORIDE 75 MG/1
CAPSULE, EXTENDED RELEASE ORAL
Qty: 90 CAPSULE | Refills: 2 | Status: SHIPPED | OUTPATIENT
Start: 2019-08-08 | End: 2019-08-12 | Stop reason: SDUPTHER

## 2019-08-12 DIAGNOSIS — F32.A DEPRESSION, UNSPECIFIED DEPRESSION TYPE: ICD-10-CM

## 2019-08-12 DIAGNOSIS — E03.9 HYPOTHYROIDISM, UNSPECIFIED TYPE: ICD-10-CM

## 2019-08-12 RX ORDER — LEVOTHYROXINE SODIUM 112 UG/1
112 TABLET ORAL DAILY
Qty: 90 TABLET | Refills: 0 | Status: SHIPPED | OUTPATIENT
Start: 2019-08-12 | End: 2019-11-11 | Stop reason: SDUPTHER

## 2019-08-12 RX ORDER — VENLAFAXINE HYDROCHLORIDE 75 MG/1
75 CAPSULE, EXTENDED RELEASE ORAL DAILY
Qty: 90 CAPSULE | Refills: 1 | Status: SHIPPED | OUTPATIENT
Start: 2019-08-12 | End: 2020-08-14

## 2019-09-04 ENCOUNTER — OFFICE VISIT (OUTPATIENT)
Dept: FAMILY MEDICINE CLINIC | Facility: CLINIC | Age: 74
End: 2019-09-04
Payer: MEDICARE

## 2019-09-04 VITALS
TEMPERATURE: 98.4 F | WEIGHT: 163.8 LBS | SYSTOLIC BLOOD PRESSURE: 120 MMHG | OXYGEN SATURATION: 96 % | HEIGHT: 61 IN | BODY MASS INDEX: 30.93 KG/M2 | HEART RATE: 104 BPM | DIASTOLIC BLOOD PRESSURE: 72 MMHG

## 2019-09-04 DIAGNOSIS — H40.9 GLAUCOMA OF BOTH EYES, UNSPECIFIED GLAUCOMA TYPE: ICD-10-CM

## 2019-09-04 DIAGNOSIS — H26.9 CATARACT OF BOTH EYES, UNSPECIFIED CATARACT TYPE: ICD-10-CM

## 2019-09-04 DIAGNOSIS — E03.9 HYPOTHYROIDISM, UNSPECIFIED TYPE: Primary | ICD-10-CM

## 2019-09-04 DIAGNOSIS — E78.2 MIXED HYPERLIPIDEMIA: ICD-10-CM

## 2019-09-04 DIAGNOSIS — E03.9 ACQUIRED HYPOTHYROIDISM: ICD-10-CM

## 2019-09-04 DIAGNOSIS — F32.A DEPRESSION, UNSPECIFIED DEPRESSION TYPE: ICD-10-CM

## 2019-09-04 DIAGNOSIS — I10 ESSENTIAL HYPERTENSION: ICD-10-CM

## 2019-09-04 PROCEDURE — 99214 OFFICE O/P EST MOD 30 MIN: CPT | Performed by: SPECIALIST

## 2019-09-04 PROCEDURE — G0439 PPPS, SUBSEQ VISIT: HCPCS | Performed by: SPECIALIST

## 2019-09-04 NOTE — PROGRESS NOTES
Assessment and Plan:     Problem List Items Addressed This Visit     None         History of Present Illness:     Patient presents for Welcome to Medicare visit  Patient Care Team:  Asuncion Caballero MD as PCP - General (Internal Medicine)     Review of Systems:     Review of Systems   Gastrointestinal: Negative for bowel incontinence  Psychiatric/Behavioral: The patient is not nervous/anxious  Problem List:     There is no problem list on file for this patient       Past Medical and Surgical History:     Past Medical History:   Diagnosis Date    Depression     Diverticulosis     Glaucoma 2015    medication    Hemorrhoids 2009    Hyperlipidemia     Hypertension     Hypothyroidism      Past Surgical History:   Procedure Laterality Date    CARPAL TUNNEL RELEASE Bilateral     , 2018    HYSTERECTOMY      age 62    OOPHORECTOMY Bilateral age 62      Family History:     Family History   Problem Relation Age of Onset    Hypertension Mother     Diabetes Father     Brain cancer Sister     No Known Problems Daughter     No Known Problems Maternal Grandmother     No Known Problems Maternal Grandfather     No Known Problems Paternal Grandmother     No Known Problems Paternal Grandfather     No Known Problems Sister     No Known Problems Sister     No Known Problems Sister     No Known Problems Daughter     No Known Problems Daughter     No Known Problems Paternal Aunt     No Known Problems Paternal Aunt     No Known Problems Paternal Aunt     No Known Problems Paternal Aunt       Social History:     Social History     Tobacco Use   Smoking Status Former Smoker    Packs/day: 20 00    Years: 10 00    Pack years: 200 00    Types: Cigarettes    Last attempt to quit:     Years since quittin 6   Smokeless Tobacco Never Used   Tobacco Comment    no passive smoked exposure      Social History     Substance and Sexual Activity   Alcohol Use Yes    Comment: Socially     Social History     Substance and Sexual Activity   Drug Use No      Medications and Allergies:     Current Outpatient Medications   Medication Sig Dispense Refill    atorvastatin (LIPITOR) 10 mg tablet Take 1 tablet (10 mg total) by mouth daily at bedtime 90 tablet 2    bimatoprost (LUMIGAN) 0 01 % ophthalmic drops i gtt OD QHS      cholecalciferol (VITAMIN D3) 1,000 units tablet Take 1,000 Units by mouth daily      levothyroxine (SYNTHROID) 112 mcg tablet Take 1 tablet (112 mcg total) by mouth daily 90 tablet 0    LORazepam (ATIVAN) 0 5 mg tablet Take 1 tablet (0 5 mg total) by mouth 2 (two) times a day as needed for anxiety 60 tablet 0    losartan (COZAAR) 50 mg tablet Take 1 tablet (50 mg total) by mouth daily 90 tablet 1    Lutein 20 MG TABS 1 tablet by mouth daily      Naproxen Sodium (ALEVE) 220 MG CAPS i by oral route PRN as needed      Probiotic Product (PROBIOTIC-10 PO) Take by mouth      ranibizumab (LUCENTIS) 0 3 mg/0 05mL 0 3 mg by Intravitreal route once      venlafaxine (EFFEXOR-XR) 75 mg 24 hr capsule Take 1 capsule (75 mg total) by mouth daily 90 capsule 1     No current facility-administered medications for this visit  Allergies   Allergen Reactions    Sulfa Antibiotics Rash      Immunizations:     Immunization History   Administered Date(s) Administered    Fluzone Split Quad 0 25 mL 10/22/2015    INFLUENZA 10/22/2015, 11/02/2017, 10/30/2018    Influenza Split 10/17/2013    Influenza Split High Dose Preservative Free IM 10/28/2016    Influenza TIV (IM) 10/04/2012, 10/30/2014    Influenza, high dose seasonal 0 5 mL 10/30/2018    Pneumococcal Polysaccharide PPV23 01/01/2014    Td (adult), adsorbed 03/13/2015    Zoster 02/23/2011      Medicare Screening Tests and Risk Assessments: Ester Jaffe is here for her Subsequent Wellness visit  Last Medicare Wellness visit information reviewed, patient interviewed and updates made to the record today       Health Risk Assessment:  Patient rates overall health as good  Patient feels that their physical health rating is Same  Eyesight was rated as Same  Hearing was rated as Same  Patient feels that their emotional and mental health rating is Same  Pain experienced by patient in the last 7 days has been Some  Patient's pain rating has been 6/10  Patient states that she has experienced no weight loss or gain in last 6 months  (Additional comments: BILATERAL KNEE PAIN )    Emotional/Mental Health:  Patient has not been feeling nervous/anxious  PHQ-9 Depression Screening:    Frequency of the following problems over the past two weeks:      1  Little interest or pleasure in doing things: 0 - not at all      2  Feeling down, depressed, or hopeless: 0 - not at all  PHQ-2 Score: 0          Broken Bones/Falls: Fall Risk Assessment:    In the past year, patient has experienced: History of falling in past year    Number of falls: 1    Injured during fall: No      Patient feels steady when standing or walking  Patient is not worried about falling  Bladder/Bowel:  Patient has not leaked urine accidently in the last six months  Patient reports no loss of bowel control  Immunizations:  Patient has had a flu vaccination within the last year  Patient has received a pneumonia shot  Patient has received a shingles shot  Patient has received tetanus/diphtheria shot  Home Safety:  Patient does not have trouble with stairs inside or outside of their home  Patient currently reports that there are safety hazards present in home , working smoke alarms, working carbon monoxide detectors  Preventative Screenings:   Breast cancer screening performed, no colon cancer screen completed, cholesterol screen completed, glaucoma eye exam completed,     Nutrition:  Current diet: Regular with servings of the following:    Medications:  Patient is currently taking over-the-counter supplements  Patient is able to manage medications      Lifestyle Choices:  Patient reports no tobacco use  Patient has not smoked or used tobacco in the past   Patient reports alcohol use  Alcohol use per week: SOCIAL  Patient drives a vehicle  Patient wears seat belt  Current level of exercise of physical activity described by patient as: Gracie Oreilly ,[address]  Activities of Daily Living:  Can get out of bed by his or her self, able to dress self, able to make own meals, able to do own shopping, able to bathe self, can do own laundry/housekeeping, can manage own money, pay bills and track expenses    Previous Hospitalizations:  No hospitalization or ED visit in past 12 months        Advanced Directives:  Patient has decided on a power of   Patient has spoken to designated power of   Patient has completed advanced directive  Preventative Screening/Counseling:      Cardiovascular:      General: Risks and Benefits Discussed      Counseling: Healthy Diet and Healthy Weight          Diabetes:      General: Risks and Benefits Discussed      Counseling: Healthy Diet and Healthy Weight          Colorectal Cancer:      General: Risks and Benefits Discussed      Counseling: high fiber diet          Breast Cancer:      General: Risks and Benefits Discussed          Cervical Cancer:      General: Risks and Benefits Discussed          Osteoporosis:      General: Risks and Benefits Discussed      Counseling: Calcium and Vitamin D Intake          AAA:      General: Risks and Benefits Discussed          Glaucoma:      General: Risks and Benefits Discussed          HIV:      General: Screening Not Indicated and Risks and Benefits Discussed      Counseling: HIV Prevention          Hepatitis C:      General: Risks and Benefits Discussed and Screening Not Indicated        Advanced Directives:   Patient has living will for healthcare, has durable POA for healthcare, patient has an advanced directive  Information on ACP and/or AD provided   End of life assessment reviewed with patient  Provider agrees with end of life decisions        Immunizations:  Patient reviewed and up to date          No exam data present     Physical Exam:     Pulse 104   Temp 98 4 °F (36 9 °C) (Tympanic)   Ht 5' 1" (1 549 m)   Wt 74 3 kg (163 lb 12 8 oz)   SpO2 96%   BMI 30 95 kg/m²     Physical Exam

## 2019-09-04 NOTE — PATIENT INSTRUCTIONS
Do laboratory test prior to next visit    Continue your health maintenance exams with dentist    Gyn   :  Rectal specialist      eye doctor and glaucoma specialist

## 2019-09-04 NOTE — PROGRESS NOTES
Assessment/Plan:    Stable    Recently  Paid  For   Vascular   Screen  Told  It  Was   Normal       Lab  Reviewed          Patient seen in office today  During the visit I was accompanied by MA while physical exam was completed  No problem-specific Assessment & Plan notes found for this encounter           Problem List Items Addressed This Visit     None      Visit Diagnoses     Hypothyroidism, unspecified type    -  Primary    Relevant Orders    CBC and differential    Comprehensive metabolic panel    Lipid panel    Occult Blood, Fecal Immunochemical    TSH, 3rd generation    T4, free    UA w Reflex to Microscopic w Reflex to Culture -Lab Collect    Depression, unspecified depression type        Relevant Orders    CBC and differential    Comprehensive metabolic panel    Lipid panel    Occult Blood, Fecal Immunochemical    TSH, 3rd generation    T4, free    UA w Reflex to Microscopic w Reflex to Culture -Lab Collect    Acquired hypothyroidism        Relevant Orders    CBC and differential    Comprehensive metabolic panel    Lipid panel    Occult Blood, Fecal Immunochemical    TSH, 3rd generation    T4, free    UA w Reflex to Microscopic w Reflex to Culture -Lab Collect    Mixed hyperlipidemia        Relevant Orders    CBC and differential    Comprehensive metabolic panel    Lipid panel    Occult Blood, Fecal Immunochemical    TSH, 3rd generation    T4, free    UA w Reflex to Microscopic w Reflex to Culture -Lab Collect    Essential hypertension        Relevant Orders    CBC and differential    Comprehensive metabolic panel    Lipid panel    Occult Blood, Fecal Immunochemical    TSH, 3rd generation    T4, free    UA w Reflex to Microscopic w Reflex to Culture -Lab Collect    Glaucoma of both eyes, unspecified glaucoma type        Relevant Orders    CBC and differential    Comprehensive metabolic panel    Lipid panel    Occult Blood, Fecal Immunochemical    TSH, 3rd generation    T4, free    UA w Reflex to Microscopic w Reflex to Culture -Lab Collect    Cataract of both eyes, unspecified cataract type        Relevant Orders    CBC and differential    Comprehensive metabolic panel    Lipid panel    Occult Blood, Fecal Immunochemical    TSH, 3rd generation    T4, free    UA w Reflex to Microscopic w Reflex to Culture -Lab Collect            Subjective:     BMI Counseling: Body mass index is 30 95 kg/m²  Discussed the patient's BMI with her  The BMI is above average  BMI counseling and education was provided to the patient  Nutrition recommendations include decreasing overall calorie intake  Depression Screening Follow-up Plan: Patient's depression screening was positive with a PHQ-2 score of 0  Their PHQ-9 score was   Patient assessed for underlying major depression  They have no active suicidal ideations  Brief counseling provided and recommend additional follow-up/re-evaluation next office visit  Patient ID: Skip Hightower is a 66 y o  female  18-year-old female feels well no exercise intolerance can easily do 2 flights of steps and walk more than 4 blocks    The laboratory data reviewed sugar was 100 she needs to watch her excessive carbohydrates    Thyroid TSH and T4 normal    Patient has glaucoma both eyes macular degeneration and cataracts she is eye specialist and glaucoma specialist    Patient has been seen by eye doctor     dentist        gyn exam        Mammography       colonoscopy scheduled         as noted recently had a vascular survey from a prior individual company and was told was in normal within normal limits      The following portions of the patient's history were reviewed and updated as appropriate: allergies, current medications, past family history, past medical history, past social history, past surgical history and problem list     Review of Systems   Constitutional: Negative for activity change, appetite change, chills, diaphoresis, fatigue and fever     HENT: Negative for congestion, sinus pressure, sinus pain and voice change  Eyes: Negative for visual disturbance  Respiratory: Negative for cough, shortness of breath and wheezing  Cardiovascular: Negative for chest pain, palpitations and leg swelling  Gastrointestinal: Negative for abdominal distention and abdominal pain  Genitourinary: Negative for difficulty urinating and dysuria  Musculoskeletal: Negative for arthralgias, back pain, gait problem, joint swelling, myalgias, neck pain and neck stiffness  Skin: Negative  Neurological: Negative for dizziness, tremors, seizures, syncope, facial asymmetry, speech difficulty, light-headedness, numbness and headaches  Psychiatric/Behavioral: Negative for agitation and behavioral problems  Objective:      /72 (BP Location: Left arm, Patient Position: Sitting, Cuff Size: Standard)   Pulse 104   Temp 98 4 °F (36 9 °C) (Tympanic)   Ht 5' 1" (1 549 m)   Wt 74 3 kg (163 lb 12 8 oz)   SpO2 96%   BMI 30 95 kg/m²          Physical Exam   Constitutional: No distress  HENT:   Mouth/Throat: No oropharyngeal exudate  Eyes: Right eye exhibits no discharge  Left eye exhibits no discharge  No scleral icterus  Neck: No JVD present  Cardiovascular: Normal rate, regular rhythm, normal heart sounds and intact distal pulses  No murmur heard  Pulmonary/Chest: Effort normal and breath sounds normal  She has no wheezes  Abdominal: Soft  Musculoskeletal: She exhibits no deformity  Neurological: She is alert  Skin: Skin is warm and dry  She is not diaphoretic  Psychiatric: She has a normal mood and affect

## 2019-09-05 LAB
BACTERIA UR QL AUTO: NORMAL /HPF
BILIRUB UR QL STRIP: NEGATIVE
CLARITY UR: CLEAR
COLOR UR: YELLOW
GLUCOSE UR STRIP-MCNC: NEGATIVE MG/DL
HGB UR QL STRIP.AUTO: NEGATIVE
HYALINE CASTS #/AREA URNS LPF: NORMAL /LPF
KETONES UR STRIP-MCNC: NEGATIVE MG/DL
LEUKOCYTE ESTERASE UR QL STRIP: ABNORMAL
NITRITE UR QL STRIP: NEGATIVE
NON-SQ EPI CELLS URNS QL MICRO: NORMAL /HPF
PH UR STRIP.AUTO: 5.5 [PH]
PROT UR STRIP-MCNC: NEGATIVE MG/DL
RBC #/AREA URNS AUTO: NORMAL /HPF
SP GR UR STRIP.AUTO: 1.03 (ref 1–1.03)
UROBILINOGEN UR QL STRIP.AUTO: 0.2 E.U./DL
WBC #/AREA URNS AUTO: NORMAL /HPF

## 2019-11-11 DIAGNOSIS — E03.9 HYPOTHYROIDISM, UNSPECIFIED TYPE: ICD-10-CM

## 2019-11-12 RX ORDER — LEVOTHYROXINE SODIUM 112 UG/1
TABLET ORAL
Qty: 90 TABLET | Refills: 0 | Status: SHIPPED | OUTPATIENT
Start: 2019-11-12 | End: 2020-01-17

## 2019-11-18 DIAGNOSIS — I10 HYPERTENSION, UNSPECIFIED TYPE: ICD-10-CM

## 2019-11-18 RX ORDER — LOSARTAN POTASSIUM 50 MG/1
50 TABLET ORAL DAILY
Qty: 90 TABLET | Refills: 1 | Status: SHIPPED | OUTPATIENT
Start: 2019-11-18 | End: 2020-04-24 | Stop reason: SDUPTHER

## 2020-01-16 DIAGNOSIS — E03.9 HYPOTHYROIDISM, UNSPECIFIED TYPE: ICD-10-CM

## 2020-01-17 RX ORDER — LEVOTHYROXINE SODIUM 112 UG/1
TABLET ORAL
Qty: 90 TABLET | Refills: 0 | Status: SHIPPED | OUTPATIENT
Start: 2020-01-17 | End: 2020-04-24

## 2020-03-09 ENCOUNTER — OFFICE VISIT (OUTPATIENT)
Dept: FAMILY MEDICINE CLINIC | Facility: CLINIC | Age: 75
End: 2020-03-09
Payer: MEDICARE

## 2020-03-09 VITALS
DIASTOLIC BLOOD PRESSURE: 78 MMHG | SYSTOLIC BLOOD PRESSURE: 138 MMHG | HEIGHT: 61 IN | WEIGHT: 158.8 LBS | OXYGEN SATURATION: 97 % | TEMPERATURE: 97.7 F | BODY MASS INDEX: 29.98 KG/M2 | HEART RATE: 72 BPM

## 2020-03-09 DIAGNOSIS — E78.2 MIXED HYPERLIPIDEMIA: ICD-10-CM

## 2020-03-09 DIAGNOSIS — H40.9 GLAUCOMA OF BOTH EYES, UNSPECIFIED GLAUCOMA TYPE: ICD-10-CM

## 2020-03-09 DIAGNOSIS — F32.A DEPRESSION, UNSPECIFIED DEPRESSION TYPE: ICD-10-CM

## 2020-03-09 DIAGNOSIS — E03.9 HYPOTHYROIDISM, UNSPECIFIED TYPE: Primary | ICD-10-CM

## 2020-03-09 DIAGNOSIS — E03.9 ACQUIRED HYPOTHYROIDISM: ICD-10-CM

## 2020-03-09 DIAGNOSIS — H26.9 CATARACT OF BOTH EYES, UNSPECIFIED CATARACT TYPE: ICD-10-CM

## 2020-03-09 DIAGNOSIS — H35.3290 EXUDATIVE AGE-RELATED MACULAR DEGENERATION, UNSPECIFIED LATERALITY, UNSPECIFIED STAGE (HCC): ICD-10-CM

## 2020-03-09 DIAGNOSIS — I10 HYPERTENSION, UNSPECIFIED TYPE: ICD-10-CM

## 2020-03-09 PROCEDURE — 3078F DIAST BP <80 MM HG: CPT | Performed by: SPECIALIST

## 2020-03-09 PROCEDURE — 3008F BODY MASS INDEX DOCD: CPT | Performed by: SPECIALIST

## 2020-03-09 PROCEDURE — 99214 OFFICE O/P EST MOD 30 MIN: CPT | Performed by: SPECIALIST

## 2020-03-09 PROCEDURE — 1160F RVW MEDS BY RX/DR IN RCRD: CPT | Performed by: SPECIALIST

## 2020-03-09 PROCEDURE — 3075F SYST BP GE 130 - 139MM HG: CPT | Performed by: SPECIALIST

## 2020-03-09 PROCEDURE — 4040F PNEUMOC VAC/ADMIN/RCVD: CPT | Performed by: SPECIALIST

## 2020-03-09 PROCEDURE — 1036F TOBACCO NON-USER: CPT | Performed by: SPECIALIST

## 2020-03-09 NOTE — PROGRESS NOTES
Assessment/Plan:    Patient seen generally has been doing well except she has increased pain in her metacarpophalangeal joints has some a m  stiffness is no wrist pain or elbow pain there is some bilateral knee pain    She has a history of total thyroidectomy approximately 12 years ago on replacement therapy    No cardio or pulmonary symptoms    For depression is not a present problem    Blood pressure controlled    Lipids being treated with statins    Stable except for new arthritic complaints    Patient seen in office today  During the visit I was accompanied by MA while physical exam was completed  No problem-specific Assessment & Plan notes found for this encounter           Problem List Items Addressed This Visit        Other    Exudative age-related macular degeneration ([facility] [address] )    Relevant Orders    CBC and differential    Comprehensive metabolic panel    C-reactive protein    Lipid panel    T4, free    TSH, 3rd generation    UA w Reflex to Microscopic w Reflex to Culture -Lab Collect    Sedimentation rate, automated    Cyclic citrul peptide antibody, IgG    TRAVIS Screen w/ Reflex to Titer/Pattern    RF Screen w/ Reflex to Titer      Other Visit Diagnoses     Hypothyroidism, unspecified type    -  Primary    Relevant Orders    CBC and differential    Comprehensive metabolic panel    C-reactive protein    Lipid panel    T4, free    TSH, 3rd generation    UA w Reflex to Microscopic w Reflex to Culture -Lab Collect    Sedimentation rate, automated    Cyclic citrul peptide antibody, IgG    TRAVIS Screen w/ Reflex to Titer/Pattern    RF Screen w/ Reflex to Titer    Hypertension, unspecified type        Relevant Orders    CBC and differential    Comprehensive metabolic panel    C-reactive protein    Lipid panel    T4, free    TSH, 3rd generation    UA w Reflex to Microscopic w Reflex to Culture -Lab Collect    Sedimentation rate, automated    Cyclic citrul peptide antibody, IgG    TRAVIS Screen w/ Reflex to Titer/Pattern RF Screen w/ Reflex to Titer    Depression, unspecified depression type        Relevant Orders    CBC and differential    Comprehensive metabolic panel    C-reactive protein    Lipid panel    T4, free    TSH, 3rd generation    UA w Reflex to Microscopic w Reflex to Culture -Lab Collect    Sedimentation rate, automated    Cyclic citrul peptide antibody, IgG    TRAVIS Screen w/ Reflex to Titer/Pattern    RF Screen w/ Reflex to Titer    Acquired hypothyroidism        Relevant Orders    CBC and differential    Comprehensive metabolic panel    C-reactive protein    Lipid panel    T4, free    TSH, 3rd generation    UA w Reflex to Microscopic w Reflex to Culture -Lab Collect    Sedimentation rate, automated    Cyclic citrul peptide antibody, IgG    TRAVIS Screen w/ Reflex to Titer/Pattern    RF Screen w/ Reflex to Titer    Mixed hyperlipidemia        Relevant Orders    CBC and differential    Comprehensive metabolic panel    C-reactive protein    Lipid panel    T4, free    TSH, 3rd generation    UA w Reflex to Microscopic w Reflex to Culture -Lab Collect    Sedimentation rate, automated    Cyclic citrul peptide antibody, IgG    TRAVIS Screen w/ Reflex to Titer/Pattern    RF Screen w/ Reflex to Titer    Glaucoma of both eyes, unspecified glaucoma type        Relevant Orders    CBC and differential    Comprehensive metabolic panel    C-reactive protein    Lipid panel    T4, free    TSH, 3rd generation    UA w Reflex to Microscopic w Reflex to Culture -Lab Collect    Sedimentation rate, automated    Cyclic citrul peptide antibody, IgG    TRAVIS Screen w/ Reflex to Titer/Pattern    RF Screen w/ Reflex to Titer    Cataract of both eyes, unspecified cataract type        Relevant Orders    CBC and differential    Comprehensive metabolic panel    C-reactive protein    Lipid panel    T4, free    TSH, 3rd generation    UA w Reflex to Microscopic w Reflex to Culture -Lab Collect    Sedimentation rate, automated    Cyclic citrul peptide antibody, IgG    TRAVIS Screen w/ Reflex to Titer/Pattern    RF Screen w/ Reflex to Titer            Subjective:            Patient ID: Jesika Chin is a 66 y o  female  58-year-old female has been doing well his active goes exercise class 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 [employer] she does complain of some increased hand pains in the metacarpophalangeal joint some of which may be related to over repetitive motion injury her use a but I a m  going to check her for early rheumatoid changes with TRAVIS sed rate rheumatoid factor and anti CCP    Laboratory testing ordered    Depression is stable blood pressure stable hyperlipidemia being treated          The following portions of the patient's history were reviewed and updated as appropriate: allergies, past family history, past social history and past surgical history  Review of Systems   Constitutional: Negative for activity change, appetite change, chills, diaphoresis, fatigue and fever  HENT: Negative for voice change  Eyes: Negative for visual disturbance  Being treated with eye injections for macular degeneration   Respiratory: Negative for cough, chest tightness, shortness of breath and wheezing  Cardiovascular: Negative  Gastrointestinal: Negative for abdominal distention  Genitourinary: Negative for difficulty urinating and dysuria  Musculoskeletal: Negative for arthralgias, back pain, gait problem, joint swelling, myalgias, neck pain and neck stiffness  Skin: Negative  Neurological: Negative for dizziness, tremors, seizures, syncope, facial asymmetry, speech difficulty, weakness, light-headedness, numbness and headaches  Psychiatric/Behavioral: Negative for agitation           Objective:      /78 (BP Location: Left arm, Patient Position: Sitting, Cuff Size: Standard)   Pulse 72   Temp 97 7 °F (36 5 °C) (Tympanic)   Ht 5' 1" (1 549 m)   Wt 72 kg (158 lb 12 8 oz)   SpO2 97%   BMI 30 00 kg/m²          Physical Exam   Constitutional: She is oriented to person, place, and time  Eyes: No scleral icterus  Neck: No JVD present  Cardiovascular: Normal rate, regular rhythm, normal heart sounds and intact distal pulses  No murmur heard  Pulmonary/Chest: Effort normal  No respiratory distress  Abdominal: Soft  She exhibits no distension  Musculoskeletal: Normal range of motion  She exhibits no edema  Neurological: She is alert and oriented to person, place, and time  Skin: Skin is warm and dry  She is not diaphoretic  No erythema  Psychiatric: She has a normal mood and affect

## 2020-04-21 DIAGNOSIS — E03.9 HYPOTHYROIDISM, UNSPECIFIED TYPE: ICD-10-CM

## 2020-04-24 DIAGNOSIS — I10 HYPERTENSION, UNSPECIFIED TYPE: ICD-10-CM

## 2020-04-24 RX ORDER — LEVOTHYROXINE SODIUM 112 UG/1
TABLET ORAL
Qty: 90 TABLET | Refills: 0 | Status: SHIPPED | OUTPATIENT
Start: 2020-04-24 | End: 2020-06-15

## 2020-04-27 RX ORDER — LOSARTAN POTASSIUM 50 MG/1
50 TABLET ORAL DAILY
Qty: 90 TABLET | Refills: 1 | Status: SHIPPED | OUTPATIENT
Start: 2020-04-27 | End: 2020-11-19

## 2020-06-13 ENCOUNTER — HOSPITAL ENCOUNTER (EMERGENCY)
Facility: HOSPITAL | Age: 75
Discharge: HOME | End: 2020-06-13
Attending: EMERGENCY MEDICINE | Admitting: EMERGENCY MEDICINE
Payer: MEDICARE

## 2020-06-13 ENCOUNTER — APPOINTMENT (EMERGENCY)
Dept: CT IMAGING | Facility: HOSPITAL | Age: 75
End: 2020-06-13
Payer: MEDICARE

## 2020-06-13 ENCOUNTER — NURSE TRIAGE (OUTPATIENT)
Dept: OTHER | Facility: OTHER | Age: 75
End: 2020-06-13

## 2020-06-13 VITALS
HEART RATE: 79 BPM | OXYGEN SATURATION: 98 % | TEMPERATURE: 98.8 F | WEIGHT: 165.57 LBS | DIASTOLIC BLOOD PRESSURE: 75 MMHG | BODY MASS INDEX: 31.28 KG/M2 | SYSTOLIC BLOOD PRESSURE: 149 MMHG | RESPIRATION RATE: 16 BRPM

## 2020-06-13 DIAGNOSIS — R42 VERTIGO: Primary | ICD-10-CM

## 2020-06-13 LAB
ALBUMIN SERPL BCP-MCNC: 3.3 G/DL (ref 3.5–5)
ALP SERPL-CCNC: 53 U/L (ref 46–116)
ALT SERPL W P-5'-P-CCNC: 19 U/L (ref 12–78)
ANION GAP SERPL CALCULATED.3IONS-SCNC: 6 MMOL/L (ref 4–13)
AST SERPL W P-5'-P-CCNC: 17 U/L (ref 5–45)
BASOPHILS # BLD AUTO: 0.04 THOUSANDS/ΜL (ref 0–0.1)
BASOPHILS NFR BLD AUTO: 1 % (ref 0–1)
BILIRUB SERPL-MCNC: 0.76 MG/DL (ref 0.2–1)
BUN SERPL-MCNC: 22 MG/DL (ref 5–25)
CALCIUM SERPL-MCNC: 8.5 MG/DL (ref 8.3–10.1)
CHLORIDE SERPL-SCNC: 109 MMOL/L (ref 100–108)
CO2 SERPL-SCNC: 25 MMOL/L (ref 21–32)
CREAT SERPL-MCNC: 0.65 MG/DL (ref 0.6–1.3)
EOSINOPHIL # BLD AUTO: 0.18 THOUSAND/ΜL (ref 0–0.61)
EOSINOPHIL NFR BLD AUTO: 3 % (ref 0–6)
ERYTHROCYTE [DISTWIDTH] IN BLOOD BY AUTOMATED COUNT: 13.5 % (ref 11.6–15.1)
GFR SERPL CREATININE-BSD FRML MDRD: 86 ML/MIN/1.73SQ M
GLUCOSE SERPL-MCNC: 93 MG/DL (ref 65–140)
HCT VFR BLD AUTO: 37.7 % (ref 34.8–46.1)
HGB BLD-MCNC: 12.2 G/DL (ref 11.5–15.4)
IMM GRANULOCYTES # BLD AUTO: 0.01 THOUSAND/UL (ref 0–0.2)
IMM GRANULOCYTES NFR BLD AUTO: 0 % (ref 0–2)
LYMPHOCYTES # BLD AUTO: 1.64 THOUSANDS/ΜL (ref 0.6–4.47)
LYMPHOCYTES NFR BLD AUTO: 26 % (ref 14–44)
MCH RBC QN AUTO: 32.4 PG (ref 26.8–34.3)
MCHC RBC AUTO-ENTMCNC: 32.4 G/DL (ref 31.4–37.4)
MCV RBC AUTO: 100 FL (ref 82–98)
MONOCYTES # BLD AUTO: 0.79 THOUSAND/ΜL (ref 0.17–1.22)
MONOCYTES NFR BLD AUTO: 12 % (ref 4–12)
NEUTROPHILS # BLD AUTO: 3.72 THOUSANDS/ΜL (ref 1.85–7.62)
NEUTS SEG NFR BLD AUTO: 58 % (ref 43–75)
NRBC BLD AUTO-RTO: 0 /100 WBCS
PLATELET # BLD AUTO: 233 THOUSANDS/UL (ref 149–390)
PMV BLD AUTO: 10.6 FL (ref 8.9–12.7)
POTASSIUM SERPL-SCNC: 4 MMOL/L (ref 3.5–5.3)
PROT SERPL-MCNC: 6.5 G/DL (ref 6.4–8.2)
RBC # BLD AUTO: 3.77 MILLION/UL (ref 3.81–5.12)
SODIUM SERPL-SCNC: 140 MMOL/L (ref 136–145)
T4 FREE SERPL-MCNC: 1.24 NG/DL (ref 0.76–1.46)
TSH SERPL DL<=0.05 MIU/L-ACNC: 0.32 UIU/ML (ref 0.36–3.74)
WBC # BLD AUTO: 6.38 THOUSAND/UL (ref 4.31–10.16)

## 2020-06-13 PROCEDURE — 96374 THER/PROPH/DIAG INJ IV PUSH: CPT

## 2020-06-13 PROCEDURE — 93005 ELECTROCARDIOGRAM TRACING: CPT

## 2020-06-13 PROCEDURE — 99285 EMERGENCY DEPT VISIT HI MDM: CPT

## 2020-06-13 PROCEDURE — 84443 ASSAY THYROID STIM HORMONE: CPT | Performed by: EMERGENCY MEDICINE

## 2020-06-13 PROCEDURE — 36415 COLL VENOUS BLD VENIPUNCTURE: CPT | Performed by: EMERGENCY MEDICINE

## 2020-06-13 PROCEDURE — 85025 COMPLETE CBC W/AUTO DIFF WBC: CPT | Performed by: EMERGENCY MEDICINE

## 2020-06-13 PROCEDURE — 99285 EMERGENCY DEPT VISIT HI MDM: CPT | Performed by: EMERGENCY MEDICINE

## 2020-06-13 PROCEDURE — 70496 CT ANGIOGRAPHY HEAD: CPT

## 2020-06-13 PROCEDURE — 70498 CT ANGIOGRAPHY NECK: CPT

## 2020-06-13 PROCEDURE — 80053 COMPREHEN METABOLIC PANEL: CPT | Performed by: EMERGENCY MEDICINE

## 2020-06-13 PROCEDURE — 96361 HYDRATE IV INFUSION ADD-ON: CPT

## 2020-06-13 PROCEDURE — 84439 ASSAY OF FREE THYROXINE: CPT | Performed by: EMERGENCY MEDICINE

## 2020-06-13 RX ORDER — MECLIZINE HCL 12.5 MG/1
12.5 TABLET ORAL 3 TIMES DAILY PRN
Qty: 30 TABLET | Refills: 0 | Status: SHIPPED | OUTPATIENT
Start: 2020-06-13

## 2020-06-13 RX ORDER — MECLIZINE HCL 12.5 MG/1
25 TABLET ORAL ONCE
Status: COMPLETED | OUTPATIENT
Start: 2020-06-13 | End: 2020-06-13

## 2020-06-13 RX ORDER — ONDANSETRON 2 MG/ML
4 INJECTION INTRAMUSCULAR; INTRAVENOUS ONCE
Status: COMPLETED | OUTPATIENT
Start: 2020-06-13 | End: 2020-06-13

## 2020-06-13 RX ORDER — DIAZEPAM 2 MG/1
5 TABLET ORAL EVERY 12 HOURS PRN
Qty: 4 TABLET | Refills: 0 | Status: SHIPPED | OUTPATIENT
Start: 2020-06-13

## 2020-06-13 RX ORDER — DIAZEPAM 2 MG/1
2 TABLET ORAL ONCE
Status: COMPLETED | OUTPATIENT
Start: 2020-06-13 | End: 2020-06-13

## 2020-06-13 RX ADMIN — SODIUM CHLORIDE 1000 ML: 0.9 INJECTION, SOLUTION INTRAVENOUS at 11:18

## 2020-06-13 RX ADMIN — IOHEXOL 85 ML: 350 INJECTION, SOLUTION INTRAVENOUS at 12:11

## 2020-06-13 RX ADMIN — MECLIZINE 25 MG: 12.5 TABLET ORAL at 11:22

## 2020-06-13 RX ADMIN — ONDANSETRON 4 MG: 2 INJECTION INTRAMUSCULAR; INTRAVENOUS at 11:18

## 2020-06-13 RX ADMIN — DIAZEPAM 2 MG: 2 TABLET ORAL at 13:21

## 2020-06-15 DIAGNOSIS — E03.9 HYPOTHYROIDISM, UNSPECIFIED TYPE: ICD-10-CM

## 2020-06-15 LAB
ATRIAL RATE: 74 BPM
P AXIS: 26 DEGREES
PR INTERVAL: 154 MS
QRS AXIS: 23 DEGREES
QRSD INTERVAL: 98 MS
QT INTERVAL: 390 MS
QTC INTERVAL: 432 MS
T WAVE AXIS: -5 DEGREES
VENTRICULAR RATE: 74 BPM

## 2020-06-15 PROCEDURE — 93010 ELECTROCARDIOGRAM REPORT: CPT | Performed by: INTERNAL MEDICINE

## 2020-06-15 RX ORDER — LEVOTHYROXINE SODIUM 112 UG/1
TABLET ORAL
Qty: 90 TABLET | Refills: 0 | Status: SHIPPED | OUTPATIENT
Start: 2020-06-15 | End: 2020-10-08 | Stop reason: SDUPTHER

## 2020-07-09 ENCOUNTER — OFFICE VISIT (OUTPATIENT)
Dept: FAMILY MEDICINE CLINIC | Facility: CLINIC | Age: 75
End: 2020-07-09
Payer: MEDICARE

## 2020-07-09 ENCOUNTER — TELEPHONE (OUTPATIENT)
Dept: FAMILY MEDICINE CLINIC | Facility: CLINIC | Age: 75
End: 2020-07-09

## 2020-07-09 VITALS
TEMPERATURE: 98.6 F | DIASTOLIC BLOOD PRESSURE: 82 MMHG | OXYGEN SATURATION: 100 % | HEART RATE: 98 BPM | SYSTOLIC BLOOD PRESSURE: 120 MMHG | WEIGHT: 165 LBS | BODY MASS INDEX: 31.18 KG/M2

## 2020-07-09 DIAGNOSIS — E78.2 MIXED HYPERLIPIDEMIA: ICD-10-CM

## 2020-07-09 DIAGNOSIS — M19.91 PRIMARY OSTEOARTHRITIS, UNSPECIFIED SITE: Primary | ICD-10-CM

## 2020-07-09 DIAGNOSIS — I10 ESSENTIAL HYPERTENSION: ICD-10-CM

## 2020-07-09 PROBLEM — M19.90 OSTEOARTHRITIS: Status: ACTIVE | Noted: 2020-07-09

## 2020-07-09 PROCEDURE — 3074F SYST BP LT 130 MM HG: CPT | Performed by: FAMILY MEDICINE

## 2020-07-09 PROCEDURE — 99214 OFFICE O/P EST MOD 30 MIN: CPT | Performed by: FAMILY MEDICINE

## 2020-07-09 PROCEDURE — 1160F RVW MEDS BY RX/DR IN RCRD: CPT | Performed by: FAMILY MEDICINE

## 2020-07-09 PROCEDURE — 1036F TOBACCO NON-USER: CPT | Performed by: FAMILY MEDICINE

## 2020-07-09 PROCEDURE — 4040F PNEUMOC VAC/ADMIN/RCVD: CPT | Performed by: FAMILY MEDICINE

## 2020-07-09 PROCEDURE — 3079F DIAST BP 80-89 MM HG: CPT | Performed by: FAMILY MEDICINE

## 2020-07-09 RX ORDER — ATORVASTATIN CALCIUM 10 MG/1
10 TABLET, FILM COATED ORAL DAILY
Qty: 90 TABLET | Refills: 1 | Status: SHIPPED | OUTPATIENT
Start: 2020-07-09 | End: 2020-07-09 | Stop reason: SDUPTHER

## 2020-07-09 RX ORDER — ATORVASTATIN CALCIUM 10 MG/1
10 TABLET, FILM COATED ORAL DAILY
Qty: 90 TABLET | Refills: 1 | Status: SHIPPED | OUTPATIENT
Start: 2020-07-09 | End: 2021-04-29 | Stop reason: SDUPTHER

## 2020-07-09 RX ORDER — PREDNISONE 20 MG/1
40 TABLET ORAL DAILY
Qty: 10 TABLET | Refills: 0 | Status: SHIPPED | OUTPATIENT
Start: 2020-07-09 | End: 2020-07-14

## 2020-07-09 NOTE — TELEPHONE ENCOUNTER
Patient called a refill of Lipitor 10 mg last filled 10/25/2019  I do not see this as a current medication  Patient has 5 pills left and needs it sent to [facility]  Please advise

## 2020-07-09 NOTE — PROGRESS NOTES
Assessment/Plan:    75-year-old woman with:  Osteoarthritis hyperlipidemia hypertension  Discussed heat and cold stretching and anti-inflammatories and will give prednisone burst x5 days and continue medications otherwise  Discussed supportive care return parameters and advised her to monitor her activities and call back if not improving worsening otherwise follow-up with Dr Fiorella Clement    No problem-specific [address] [place] [address] notes found for this encounter  Diagnoses and all orders for this visit:    Primary osteoarthritis, unspecified site  -     Discontinue: atorvastatin (LIPITOR) 10 mg tablet; Take 1 tablet (10 mg total) by mouth daily  -     atorvastatin (LIPITOR) 10 mg tablet; Take 1 tablet (10 mg total) by mouth daily  -     predniSONE 20 mg tablet; Take 2 tablets (40 mg total) by mouth daily for 5 days    Mixed hyperlipidemia    Essential hypertension          Subjective:     Chief Complaint   Patient presents with    Joint Swelling     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    Hand Pain     right hand edema, thinks she has arthritis        Patient ID: Yariel Jo is a 66 y o  female  Patient is a 75-year-old woman who presents for follow-up on osteoarthritis, hyperlipidemia and hypertension she admits being generally stable on medications but says that she has been working a lot with her hands and she has pain and swelling in her hands right greater than left and all along with her legs  No fevers chills nausea vomiting  No weakness numbness or tingling  Tolerating p o  Intake      The following portions of the patient's history were reviewed and updated as appropriate: allergies, current medications, past family history, past medical history, past social history, past surgical history and problem list     Review of Systems   Constitutional: Negative  HENT: Negative  Eyes: Negative  Respiratory: Negative  Cardiovascular: Negative  Gastrointestinal: Negative      Endocrine: Negative  Genitourinary: Negative  Musculoskeletal: Positive for arthralgias and myalgias  Allergic/Immunologic: Negative  Neurological: Negative  Hematological: Negative  Psychiatric/Behavioral: Negative  All other systems reviewed and are negative  Objective:      /82   Pulse 98   Temp 98 6 °F (37 °C)   Wt 74 8 kg (165 lb)   SpO2 100%   BMI 31 18 kg/m²          Physical Exam   Constitutional: She is oriented to person, place, and time  She appears well-developed and well-nourished  HENT:   Head: Atraumatic  Right Ear: External ear normal    Left Ear: External ear normal    Eyes: Pupils are equal, round, and reactive to light  Conjunctivae and EOM are normal    Neck: Normal range of motion  Cardiovascular: Normal rate, regular rhythm and normal heart sounds  Pulmonary/Chest: Effort normal and breath sounds normal  No respiratory distress  Abdominal: Soft  She exhibits no distension  There is no tenderness  There is no rebound and no guarding  Musculoskeletal: Normal range of motion  Neurological: She is alert and oriented to person, place, and time  No cranial nerve deficit  Skin: Skin is warm and dry  Psychiatric: She has a normal mood and affect  Her behavior is normal  Judgment and thought content normal        BMI Counseling: Body mass index is 31 18 kg/m²  The BMI is above normal  Nutrition recommendations include encouraging healthy choices of fruits and vegetables  Exercise recommendations include moderate physical activity 150 minutes/week

## 2020-07-13 ENCOUNTER — TELEPHONE (OUTPATIENT)
Dept: FAMILY MEDICINE CLINIC | Facility: CLINIC | Age: 75
End: 2020-07-13

## 2020-07-13 DIAGNOSIS — E78.2 MIXED HYPERLIPIDEMIA: Primary | ICD-10-CM

## 2020-07-13 RX ORDER — ATORVASTATIN CALCIUM 10 MG/1
10 TABLET, FILM COATED ORAL DAILY
Qty: 90 TABLET | Refills: 3 | Status: SHIPPED | OUTPATIENT
Start: 2020-07-13 | End: 2020-10-22 | Stop reason: SDUPTHER

## 2020-07-13 NOTE — TELEPHONE ENCOUNTER
Pt called in states that she was seen with Dr Sykes and prescribed the medication Prednisone  Per Dr Sykes she was to call the office to give him a update on this  Pt finished last dose today and states that it worked very well  Pt expressed being very happy with seeing Dr Sykes

## 2020-08-13 DIAGNOSIS — F32.A DEPRESSION, UNSPECIFIED DEPRESSION TYPE: ICD-10-CM

## 2020-08-14 RX ORDER — VENLAFAXINE HYDROCHLORIDE 75 MG/1
CAPSULE, EXTENDED RELEASE ORAL
Qty: 90 CAPSULE | Refills: 1 | Status: SHIPPED | OUTPATIENT
Start: 2020-08-14 | End: 2021-04-12 | Stop reason: SDUPTHER

## 2020-08-18 ENCOUNTER — TRANSCRIBE ORDERS (OUTPATIENT)
Dept: ADMINISTRATIVE | Facility: HOSPITAL | Age: 75
End: 2020-08-18

## 2020-08-18 DIAGNOSIS — Z12.31 VISIT FOR SCREENING MAMMOGRAM: Primary | ICD-10-CM

## 2020-09-17 ENCOUNTER — APPOINTMENT (OUTPATIENT)
Dept: LAB | Facility: HOSPITAL | Age: 75
End: 2020-09-17
Payer: MEDICARE

## 2020-09-17 DIAGNOSIS — H35.3290 EXUDATIVE AGE-RELATED MACULAR DEGENERATION, UNSPECIFIED LATERALITY, UNSPECIFIED STAGE (HCC): ICD-10-CM

## 2020-09-17 DIAGNOSIS — E03.9 ACQUIRED HYPOTHYROIDISM: ICD-10-CM

## 2020-09-17 DIAGNOSIS — F32.A DEPRESSION, UNSPECIFIED DEPRESSION TYPE: ICD-10-CM

## 2020-09-17 DIAGNOSIS — I10 HYPERTENSION, UNSPECIFIED TYPE: ICD-10-CM

## 2020-09-17 DIAGNOSIS — H26.9 CATARACT OF BOTH EYES, UNSPECIFIED CATARACT TYPE: ICD-10-CM

## 2020-09-17 DIAGNOSIS — E78.2 MIXED HYPERLIPIDEMIA: ICD-10-CM

## 2020-09-17 DIAGNOSIS — H40.9 GLAUCOMA OF BOTH EYES, UNSPECIFIED GLAUCOMA TYPE: ICD-10-CM

## 2020-09-17 DIAGNOSIS — E03.9 HYPOTHYROIDISM, UNSPECIFIED TYPE: ICD-10-CM

## 2020-09-17 LAB
ALBUMIN SERPL BCP-MCNC: 3.4 G/DL (ref 3.5–5)
ALP SERPL-CCNC: 56 U/L (ref 46–116)
ALT SERPL W P-5'-P-CCNC: 25 U/L (ref 12–78)
ANION GAP SERPL CALCULATED.3IONS-SCNC: 7 MMOL/L (ref 4–13)
AST SERPL W P-5'-P-CCNC: 15 U/L (ref 5–45)
BACTERIA UR QL AUTO: ABNORMAL /HPF
BASOPHILS # BLD AUTO: 0.04 THOUSANDS/ΜL (ref 0–0.1)
BASOPHILS NFR BLD AUTO: 1 % (ref 0–1)
BILIRUB SERPL-MCNC: 0.84 MG/DL (ref 0.2–1)
BILIRUB UR QL STRIP: NEGATIVE
BUN SERPL-MCNC: 24 MG/DL (ref 5–25)
CALCIUM ALBUM COR SERPL-MCNC: 9.2 MG/DL (ref 8.3–10.1)
CALCIUM SERPL-MCNC: 8.7 MG/DL (ref 8.3–10.1)
CHLORIDE SERPL-SCNC: 106 MMOL/L (ref 100–108)
CHOLEST SERPL-MCNC: 193 MG/DL (ref 50–200)
CLARITY UR: CLEAR
CO2 SERPL-SCNC: 28 MMOL/L (ref 21–32)
COLOR UR: YELLOW
CREAT SERPL-MCNC: 0.72 MG/DL (ref 0.6–1.3)
CRP SERPL QL: <3 MG/L
EOSINOPHIL # BLD AUTO: 0.26 THOUSAND/ΜL (ref 0–0.61)
EOSINOPHIL NFR BLD AUTO: 5 % (ref 0–6)
ERYTHROCYTE [DISTWIDTH] IN BLOOD BY AUTOMATED COUNT: 13.2 % (ref 11.6–15.1)
ERYTHROCYTE [SEDIMENTATION RATE] IN BLOOD: 14 MM/HOUR (ref 0–29)
GFR SERPL CREATININE-BSD FRML MDRD: 81 ML/MIN/1.73SQ M
GLUCOSE P FAST SERPL-MCNC: 91 MG/DL (ref 65–99)
GLUCOSE UR STRIP-MCNC: NEGATIVE MG/DL
HCT VFR BLD AUTO: 45.1 % (ref 34.8–46.1)
HDLC SERPL-MCNC: 78 MG/DL
HGB BLD-MCNC: 14 G/DL (ref 11.5–15.4)
HGB UR QL STRIP.AUTO: NEGATIVE
IMM GRANULOCYTES # BLD AUTO: 0.02 THOUSAND/UL (ref 0–0.2)
IMM GRANULOCYTES NFR BLD AUTO: 0 % (ref 0–2)
KETONES UR STRIP-MCNC: NEGATIVE MG/DL
LDLC SERPL CALC-MCNC: 96 MG/DL (ref 0–100)
LEUKOCYTE ESTERASE UR QL STRIP: ABNORMAL
LYMPHOCYTES # BLD AUTO: 1.62 THOUSANDS/ΜL (ref 0.6–4.47)
LYMPHOCYTES NFR BLD AUTO: 32 % (ref 14–44)
MCH RBC QN AUTO: 30.4 PG (ref 26.8–34.3)
MCHC RBC AUTO-ENTMCNC: 31 G/DL (ref 31.4–37.4)
MCV RBC AUTO: 98 FL (ref 82–98)
MONOCYTES # BLD AUTO: 0.64 THOUSAND/ΜL (ref 0.17–1.22)
MONOCYTES NFR BLD AUTO: 13 % (ref 4–12)
NEUTROPHILS # BLD AUTO: 2.42 THOUSANDS/ΜL (ref 1.85–7.62)
NEUTS SEG NFR BLD AUTO: 49 % (ref 43–75)
NITRITE UR QL STRIP: NEGATIVE
NON-SQ EPI CELLS URNS QL MICRO: ABNORMAL /HPF
NONHDLC SERPL-MCNC: 115 MG/DL
NRBC BLD AUTO-RTO: 0 /100 WBCS
PH UR STRIP.AUTO: 5.5 [PH]
PLATELET # BLD AUTO: 253 THOUSANDS/UL (ref 149–390)
PMV BLD AUTO: 10.4 FL (ref 8.9–12.7)
POTASSIUM SERPL-SCNC: 4.1 MMOL/L (ref 3.5–5.3)
PROT SERPL-MCNC: 7.3 G/DL (ref 6.4–8.2)
PROT UR STRIP-MCNC: NEGATIVE MG/DL
RBC # BLD AUTO: 4.6 MILLION/UL (ref 3.81–5.12)
RBC #/AREA URNS AUTO: ABNORMAL /HPF
RHEUMATOID FACT SER QL LA: NEGATIVE
SODIUM SERPL-SCNC: 141 MMOL/L (ref 136–145)
SP GR UR STRIP.AUTO: 1.02 (ref 1–1.03)
T4 FREE SERPL-MCNC: 1.06 NG/DL (ref 0.76–1.46)
TRIGL SERPL-MCNC: 93 MG/DL
TSH SERPL DL<=0.05 MIU/L-ACNC: 1.58 UIU/ML (ref 0.36–3.74)
UROBILINOGEN UR QL STRIP.AUTO: 0.2 E.U./DL
WBC # BLD AUTO: 5 THOUSAND/UL (ref 4.31–10.16)
WBC #/AREA URNS AUTO: ABNORMAL /HPF

## 2020-09-17 PROCEDURE — 80061 LIPID PANEL: CPT

## 2020-09-17 PROCEDURE — 84439 ASSAY OF FREE THYROXINE: CPT

## 2020-09-17 PROCEDURE — 36415 COLL VENOUS BLD VENIPUNCTURE: CPT

## 2020-09-17 PROCEDURE — 85025 COMPLETE CBC W/AUTO DIFF WBC: CPT

## 2020-09-17 PROCEDURE — 80053 COMPREHEN METABOLIC PANEL: CPT

## 2020-09-17 PROCEDURE — 86038 ANTINUCLEAR ANTIBODIES: CPT

## 2020-09-17 PROCEDURE — 86140 C-REACTIVE PROTEIN: CPT

## 2020-09-17 PROCEDURE — 85652 RBC SED RATE AUTOMATED: CPT

## 2020-09-17 PROCEDURE — 81001 URINALYSIS AUTO W/SCOPE: CPT | Performed by: SPECIALIST

## 2020-09-17 PROCEDURE — 86430 RHEUMATOID FACTOR TEST QUAL: CPT

## 2020-09-17 PROCEDURE — 84443 ASSAY THYROID STIM HORMONE: CPT

## 2020-09-17 PROCEDURE — 86200 CCP ANTIBODY: CPT

## 2020-09-18 LAB — RYE IGE QN: NEGATIVE

## 2020-09-18 RX ORDER — SENNOSIDES 8.6 MG
CAPSULE ORAL
COMMUNITY

## 2020-09-18 RX ORDER — CARBOXYMETHYLCELLULOSE SODIUM 10 MG/ML
GEL OPHTHALMIC
COMMUNITY

## 2020-09-18 RX ORDER — SODIUM, POTASSIUM,MAG SULFATES 17.5-3.13G
SOLUTION, RECONSTITUTED, ORAL ORAL
COMMUNITY
Start: 2020-08-20 | End: 2021-08-06

## 2020-09-18 RX ORDER — ASPIRIN 81 MG/1
TABLET, CHEWABLE ORAL DAILY
COMMUNITY

## 2020-09-18 RX ORDER — PRAVASTATIN SODIUM 40 MG
TABLET ORAL DAILY
COMMUNITY

## 2020-09-18 RX ORDER — LISINOPRIL 10 MG/1
TABLET ORAL DAILY
COMMUNITY
End: 2020-10-22

## 2020-09-18 RX ORDER — PYRIDOXINE HCL (VITAMIN B6) 100 MG
TABLET ORAL
COMMUNITY
End: 2021-08-06

## 2020-09-19 LAB — CCP IGA+IGG SERPL IA-ACNC: 7 UNITS (ref 0–19)

## 2020-09-21 ENCOUNTER — TELEPHONE (OUTPATIENT)
Dept: FAMILY MEDICINE CLINIC | Facility: CLINIC | Age: 75
End: 2020-09-21

## 2020-09-21 ENCOUNTER — OFFICE VISIT (OUTPATIENT)
Dept: FAMILY MEDICINE CLINIC | Facility: CLINIC | Age: 75
End: 2020-09-21
Payer: MEDICARE

## 2020-09-21 VITALS
WEIGHT: 164 LBS | BODY MASS INDEX: 30.96 KG/M2 | HEART RATE: 100 BPM | TEMPERATURE: 98.4 F | RESPIRATION RATE: 18 BRPM | HEIGHT: 61 IN | OXYGEN SATURATION: 98 % | DIASTOLIC BLOOD PRESSURE: 100 MMHG | SYSTOLIC BLOOD PRESSURE: 140 MMHG

## 2020-09-21 DIAGNOSIS — M79.642 PAIN IN BOTH HANDS: ICD-10-CM

## 2020-09-21 DIAGNOSIS — M79.601 PAIN OF RIGHT ARM: ICD-10-CM

## 2020-09-21 DIAGNOSIS — M19.91 PRIMARY OSTEOARTHRITIS, UNSPECIFIED SITE: ICD-10-CM

## 2020-09-21 DIAGNOSIS — E03.9 HYPOTHYROIDISM, UNSPECIFIED TYPE: ICD-10-CM

## 2020-09-21 DIAGNOSIS — Z00.00 MEDICARE ANNUAL WELLNESS VISIT, SUBSEQUENT: Primary | ICD-10-CM

## 2020-09-21 DIAGNOSIS — E78.2 MIXED HYPERLIPIDEMIA: ICD-10-CM

## 2020-09-21 DIAGNOSIS — M79.641 PAIN IN BOTH HANDS: ICD-10-CM

## 2020-09-21 DIAGNOSIS — I10 ESSENTIAL HYPERTENSION: ICD-10-CM

## 2020-09-21 DIAGNOSIS — F32.A DEPRESSION, UNSPECIFIED DEPRESSION TYPE: ICD-10-CM

## 2020-09-21 DIAGNOSIS — H40.9 GLAUCOMA OF BOTH EYES, UNSPECIFIED GLAUCOMA TYPE: ICD-10-CM

## 2020-09-21 PROCEDURE — G0439 PPPS, SUBSEQ VISIT: HCPCS | Performed by: SPECIALIST

## 2020-09-21 PROCEDURE — 99214 OFFICE O/P EST MOD 30 MIN: CPT | Performed by: SPECIALIST

## 2020-09-21 NOTE — PROGRESS NOTES
Assessment and Plan:     Problem List Items Addressed This Visit     None           Preventive health issues were discussed with patient, and age appropriate screening tests were ordered as noted in patient's After Visit Summary  Personalized health advice and appropriate referrals for health education or preventive services given if needed, as noted in patient's After Visit Summary       History of Present Illness:     Patient presents for Medicare Annual Wellness visit    Patient Care Team:  Simona Munoz MD as PCP - General (Internal Medicine)     Problem List:     Patient Active Problem List   Diagnosis    Exudative age-related macular degeneration ([address] )    Overweight    Calcification of coronary artery    Depressive disorder    Essential hypertension    Hypothyroidism    Mixed hyperlipidemia    Osteoarthritis      Past Medical and Surgical History:     Past Medical History:   Diagnosis Date    Depression     Diverticulosis     Glaucoma 2015    medication    Hemorrhoids 12/2009    Hyperlipidemia     Hypertension     Hypothyroidism      Past Surgical History:   Procedure Laterality Date    CARPAL TUNNEL RELEASE Bilateral     2017, 2018    HYSTERECTOMY      age 62    OOPHORECTOMY Bilateral age 62      Family History:     Family History   Problem Relation Age of Onset    Hypertension Mother     Diabetes Father     Brain cancer Sister     No Known Problems Daughter     No Known Problems Maternal Grandmother     No Known Problems Maternal Grandfather     No Known Problems Paternal Grandmother     No Known Problems Paternal Grandfather     No Known Problems Sister     No Known Problems Sister     No Known Problems Sister     No Known Problems Daughter     No Known Problems Daughter     No Known Problems Paternal Aunt     No Known Problems Paternal Aunt     No Known Problems Paternal Aunt     No Known Problems Paternal Aunt       Social History:     E-Cigarette/Vaping    E-Cigarette Use Never User      E-Cigarette/Vaping Substances    Nicotine No     THC No     CBD No     Flavoring No     Other No     Unknown No      Social History     Socioeconomic History    Marital status:      Spouse name: None    Number of children: None    Years of education: None    Highest education level: None   Occupational History    Occupation: Retired   Social Needs    Financial resource strain: Not hard at all   [address] insecurity     Worry: Never true     Inability: Never true   [employer] needs     Medical: No     Non-medical: No   Tobacco Use    Smoking status: Former Smoker     Packs/day: 20      Years: 10      Pack years: 200 00     Types: Cigarettes     Last attempt to quit:      Years since quittin 7    Smokeless tobacco: Never Used    Tobacco comment: no passive smoked exposure    Substance and Sexual Activity    Alcohol use: Yes     Comment: Socially    Drug use: No    Sexual activity: Not Currently   Lifestyle    Physical activity     Days per week: 3 days     Minutes per session: 60 min    Stress: Only a little   Relationships    Social connections     Talks on phone: More than three times a week     Gets together: More than three times a week     Attends Rastafari service: 1 to 4 times per year     Active member of club or organization: No     Attends meetings of clubs or organizations: Never     Relationship status:      Intimate partner violence     Fear of current or ex partner: No     Emotionally abused: No     Physically abused: No     Forced sexual activity: No   Other Topics Concern    None   Social History Narrative    None      Medications and Allergies:     Current Outpatient Medications   Medication Sig Dispense Refill    acetaminophen (TYLENOL) 650 mg CR tablet       aspirin 81 mg chewable tablet Chew Daily      atorvastatin (LIPITOR) 10 mg tablet Take 1 tablet (10 mg total) by mouth daily 90 tablet 1    atorvastatin (LIPITOR) 10 mg tablet Take 1 tablet (10 mg total) by mouth daily 90 tablet 3    bimatoprost (LUMIGAN) 0 01 % ophthalmic drops i gtt OD QHS      calcium carbonate-vitamin D (OSCAL-D) 500 mg-200 units per tablet       Carboxymethylcellulose Sodium (Refresh Liquigel) 1 % GEL as needed      cholecalciferol (VITAMIN D3) 1,000 units tablet Take 1,000 Units by mouth daily      diazepam (VALIUM) 2 mg tablet Take 2 5 tablets (5 mg total) by mouth every 12 (twelve) hours as needed ([facility]) for up to 4 doses 4 tablet 0    levothyroxine 112 mcg tablet TAKE 1 TABLET EVERY DAY 90 tablet 0    lisinopril (ZESTRIL) 10 mg tablet Take by mouth Daily      LORazepam (ATIVAN) 0 5 mg tablet Take 1 tablet (0 5 mg total) by mouth 2 (two) times a day as needed for anxiety 60 tablet 0    losartan (COZAAR) 50 mg tablet Take 1 tablet (50 mg total) by mouth daily 90 tablet 1    Lutein 20 MG TABS 1 tablet by mouth daily      meclizine (ANTIVERT) 12 5 MG tablet Take 1 tablet (12 5 mg total) by mouth 3 (three) times a day as needed for dizziness for up to 10 doses 30 tablet 0    Multiple Vitamins-Minerals (PRESERVISION AREDS PO)       Naproxen Sodium (ALEVE) 220 MG CAPS i by oral route PRN as needed      pravastatin (PRAVACHOL) 40 mg tablet Take by mouth Daily      pyridoxine (B-6) 100 MG tablet       ranibizumab (Lucentis) 0 3 mg/0 05mL 0 3 mg by Intravitreal route once       Suprep Bowel Prep Kit 17 5-3 13-1 6 GM/177ML SOLN Use as directed      venlafaxine (EFFEXOR-XR) 75 mg 24 hr capsule TAKE 1 CAPSULE EVERY DAY 90 capsule 1     No current facility-administered medications for this visit        Allergies   Allergen Reactions    Sulfa Antibiotics Rash      Immunizations:     Immunization History   Administered Date(s) Administered    Fluzone Split Quad 0 25 mL 10/22/2015    INFLUENZA 10/22/2015, 11/02/2017, 10/30/2018, 10/31/2019    Influenza Split 10/17/2013    Influenza Split High Dose Preservative Free IM 10/28/2016, 10/31/2019    Influenza TIV (IM) 10/04/2012, 10/30/2014    Influenza, high dose seasonal 0 7 mL 10/30/2018    Pneumococcal Polysaccharide PPV23 01/01/2014    Td (adult), adsorbed 03/13/2015    Zoster 02/23/2011      Health Maintenance: There are no preventive care reminders to display for this patient  Topic Date Due    DTaP,Tdap,and Td Vaccines (1 - Tdap) 03/25/1966    Influenza Vaccine  07/01/2020      Medicare Health Risk Assessment:     /100 (BP Location: Left arm, Patient Position: Sitting, Cuff Size: Adult)   Pulse 100   Temp 98 4 °F (36 9 °C)   Resp 18   Ht 5' 1" (1 549 m)   Wt 74 4 kg (164 lb)   SpO2 98%   BMI 30 99 kg/m²      Sarah is here for her Subsequent Wellness visit  Health Risk Assessment:   Patient rates overall health as very good  Patient feels that their physical health rating is same  Eyesight was rated as same  Hearing was rated as same  Patient feels that their emotional and mental health rating is same  Pain experienced in the last 7 days has been some  Patient's pain rating has been 6/10  Patient states that she has experienced no weight loss or gain in last 6 months  Depression Screening:   PHQ-2 Score: 0  PHQ-9 Score: 0      Fall Risk Screening: In the past year, patient has experienced: history of falling in past year    Number of falls: 1  Injured during fall?: No    Feels unsteady when standing or walking?: No    Worried about falling?: No      Urinary Incontinence Screening:   Patient has not leaked urine accidently in the last six months  Home Safety:  Patient does not have trouble with stairs inside or outside of their home  Patient has working smoke alarms and has working carbon monoxide detector  Home safety hazards include: none  Nutrition:   Current diet is Low Cholesterol, Low Saturated Fat, No Added Salt, Low Carb and Limited junk food  Attending weight watchers      Medications:   Patient is currently taking over-the-counter supplements  OTC medications include: see medication list  Patient is able to manage medications  Activities of Daily Living (ADLs)/Instrumental Activities of Daily Living (IADLs):   Walk and transfer into and out of bed and chair?: Yes  Dress and groom yourself?: Yes    Bathe or shower yourself?: Yes    Feed yourself? Yes  Do your laundry/housekeeping?: Yes  Manage your money, pay your bills and track your expenses?: Yes  Make your own meals?: Yes    Do your own shopping?: Yes    Previous Hospitalizations:   Any hospitalizations or ED visits within the last 12 months?: Yes    How many hospitalizations have you had in the last year?: 1-2    Hospitalization Comments: [facility]    Advance Care Planning:     Durable POA for healthcare:  Yes    Advanced directive: Yes      PREVENTIVE SCREENINGS      Cardiovascular Screening:    General: Screening Not Indicated and History Lipid Disorder      Diabetes Screening:     General: Screening Current      Breast Cancer Screening:     General: Screening Current      Cervical Cancer Screening:    General: Screening Not Indicated      Lung Cancer Screening:     General: Screening Not Indicated      Cherelle Trevino MD

## 2020-09-21 NOTE — PROGRESS NOTES
Assessment/Plan:    Patient seen she complains of right bicipital pain ever since she was on July she attributes his blood pressure cuff on her right arm the really bothered her omit x-ray of the right shoulder refer to orthopedic surgery and to rheumatology test for rheumatoid were negative but she has increased bilateral specially the right metacarpophalangeal pain    Patient seen in office today  During the visit I was accompanied by MA while physical exam was completed  No problem-specific Assessment & Plan notes found for this encounter           Problem List Items Addressed This Visit        Endocrine    Hypothyroidism    Relevant Orders    CBC and differential    Comprehensive metabolic panel    Lipid panel    T4, free    TSH, 3rd generation    UA w Reflex to Microscopic w Reflex to Culture -Lab Collect       Cardiovascular and Mediastinum    Essential hypertension    Relevant Orders    CBC and differential    Comprehensive metabolic panel    Lipid panel    T4, free    TSH, 3rd generation    UA w Reflex to Microscopic w Reflex to Culture -Lab Collect       Musculoskeletal and Integument    Osteoarthritis    Relevant Orders    CBC and differential    Comprehensive metabolic panel    Lipid panel    T4, free    TSH, 3rd generation    UA w Reflex to Microscopic w Reflex to Culture -Lab Collect       Other    Mixed hyperlipidemia    Relevant Orders    CBC and differential    Comprehensive metabolic panel    Lipid panel    T4, free    TSH, 3rd generation    UA w Reflex to Microscopic w Reflex to Culture -Lab Collect      Other Visit Diagnoses     Depression, unspecified depression type    -  Primary    Glaucoma of both eyes, unspecified glaucoma type        Relevant Orders    CBC and differential    Comprehensive metabolic panel    Lipid panel    T4, free    TSH, 3rd generation    UA w Reflex to Microscopic w Reflex to Culture -Lab Collect    Pain of right arm        Relevant Orders    CBC and differential Comprehensive metabolic panel    Lipid panel    T4, free    TSH, 3rd generation    UA w Reflex to Microscopic w Reflex to Culture -Lab Collect    Ambulatory referral to Orthopedic Surgery    XR shoulder 2+ vw right    Pain in both hands        Relevant Orders    Ambulatory referral to Rheumatology    Ambulatory referral to Orthopedic Surgery            Subjective:            Patient ID: Catrachito Purvis is a 66 y o  female  68-year-old female feels well she does have some increased joint pains especially in the right more than left metacarpophalangeal joints and somewhat swollen there test for rheumatoid were negative as well as a C-reactive protein and sed rate    No cardiopulmonary symptoms    She had a CT angiogram of her head done was completely normal she has seen ENT she has episodes of dizziness and lightheadedness in June and also in July and was she was in the hospital in July she claims that the right blood pressure cuff caused increased pain in the right arm and she remains with right mid arm pain and somewhat tender over the bicipital tendons a good x-rays shoulder refer her to orthopedics and she also see Rheumatology thinking    Patient's blood pressure is elevated today but she did not take her medicine she also has right arm pain monitor pressures at home after blood pressure medication resume      The following portions of the patient's history were reviewed and updated as appropriate: allergies, past family history, past social history and past surgical history  Review of Systems   Constitutional: Negative for activity change, appetite change, chills, diaphoresis, fatigue and fever  HENT: Negative for sinus pressure, sinus pain and voice change  Eyes: Negative for visual disturbance  Respiratory: Negative for cough, chest tightness, shortness of breath and wheezing  Cardiovascular: Negative for chest pain and leg swelling  Gastrointestinal: Negative for abdominal distention  Genitourinary: Negative for difficulty urinating  Musculoskeletal: Positive for arthralgias  Pain in the right more than left metacarpophalangeal joints and there is some swollen than the specially on the right   Neurological: Negative for dizziness, tremors, seizures, syncope, facial asymmetry, speech difficulty, weakness, light-headedness, numbness and headaches  Episodes of dizziness and lightheadedness         Objective:      /100 (BP Location: Left arm, Patient Position: Sitting, Cuff Size: Adult)   Pulse 100   Temp 98 4 °F (36 9 °C)   Resp 18   Ht 5' 1" (1 549 m)   Wt 74 4 kg (164 lb)   SpO2 98%   BMI 30 99 kg/m²          Physical Exam  Constitutional:       General: She is not in acute distress  Appearance: Normal appearance  She is normal weight  She is not ill-appearing, toxic-appearing or diaphoretic  Eyes:      Extraocular Movements: Extraocular movements intact  Pupils: Pupils are equal, round, and reactive to light  Neck:      Musculoskeletal: Normal range of motion  Cardiovascular:      Rate and Rhythm: Normal rate and regular rhythm  Heart sounds: No murmur  Pulmonary:      Effort: Pulmonary effort is normal       Breath sounds: Normal breath sounds  No wheezing  Abdominal:      General: There is no distension  Musculoskeletal:         General: No swelling  Comments: Swelling over the right especially metacarpophalangeal area complains of pain in her hands and also in the right bicep area some limitation of motion the right shoulder   Skin:     General: Skin is warm and dry  Neurological:      General: No focal deficit present  Mental Status: She is alert     Psychiatric:         Mood and Affect: Mood normal

## 2020-09-22 ENCOUNTER — HOSPITAL ENCOUNTER (OUTPATIENT)
Dept: RADIOLOGY | Facility: HOSPITAL | Age: 75
Discharge: HOME | End: 2020-09-22
Payer: MEDICARE

## 2020-09-22 DIAGNOSIS — M79.601 PAIN OF RIGHT ARM: ICD-10-CM

## 2020-09-22 PROCEDURE — 73030 X-RAY EXAM OF SHOULDER: CPT

## 2020-09-23 NOTE — PROGRESS NOTES
Assessment and Plan:     Problem List Items Addressed This Visit        Endocrine    Hypothyroidism    Relevant Orders    CBC and differential    Comprehensive metabolic panel    Lipid panel    T4, free    TSH, 3rd generation    UA w Reflex to Microscopic w Reflex to Culture -Lab Collect       Cardiovascular and Mediastinum    Essential hypertension    Relevant Orders    CBC and differential    Comprehensive metabolic panel    Lipid panel    T4, free    TSH, 3rd generation    UA w Reflex to Microscopic w Reflex to Culture -Lab Collect       Musculoskeletal and Integument    Osteoarthritis    Relevant Orders    CBC and differential    Comprehensive metabolic panel    Lipid panel    T4, free    TSH, 3rd generation    UA w Reflex to Microscopic w Reflex to Culture -Lab Collect       Other    Mixed hyperlipidemia    Relevant Orders    CBC and differential    Comprehensive metabolic panel    Lipid panel    T4, free    TSH, 3rd generation    UA w Reflex to Microscopic w Reflex to Culture -Lab Collect      Other Visit Diagnoses     Medicare annual wellness visit, subsequent    -  Primary    Depression, unspecified depression type        Glaucoma of both eyes, unspecified glaucoma type        Relevant Orders    CBC and differential    Comprehensive metabolic panel    Lipid panel    T4, free    TSH, 3rd generation    UA w Reflex to Microscopic w Reflex to Culture -Lab Collect    Pain of right arm        Relevant Orders    CBC and differential    Comprehensive metabolic panel    Lipid panel    T4, free    TSH, 3rd generation    UA w Reflex to Microscopic w Reflex to Culture -Lab Collect    Ambulatory referral to Orthopedic Surgery    XR shoulder 2+ vw right    Pain in both hands        Relevant Orders    Ambulatory referral to Rheumatology    Ambulatory referral to Orthopedic Surgery           Preventive health issues were discussed with patient, and age appropriate screening tests were ordered as noted in patient's After Visit Summary  Personalized health advice and appropriate referrals for health education or preventive services given if needed, as noted in patient's After Visit Summary  History of Present Illness:     Patient presents for Medicare Annual Wellness visit    Patient Care Team:  Harsha Flores MD as PCP - General (Internal Medicine)     Problem List:     Patient Active Problem List   Diagnosis    Exudative age-related macular degeneration ([address] )    Overweight    Calcification of coronary artery    Depressive disorder    Essential hypertension    Hypothyroidism    Mixed hyperlipidemia    Osteoarthritis      Past Medical and Surgical History:     Past Medical History:   Diagnosis Date    Depression     Diverticulosis     Glaucoma 2015    medication    Hemorrhoids 12/2009    Hyperlipidemia     Hypertension     Hypothyroidism      Past Surgical History:   Procedure Laterality Date    CARPAL TUNNEL RELEASE Bilateral     2017, 2018    HYSTERECTOMY      age 62    OOPHORECTOMY Bilateral age 62      Family History:     Family History   Problem Relation Age of Onset    Hypertension Mother     Diabetes Father     Brain cancer Sister     No Known Problems Daughter     No Known Problems Maternal Grandmother     No Known Problems Maternal Grandfather     No Known Problems Paternal Grandmother     No Known Problems Paternal Grandfather     No Known Problems Sister     No Known Problems Sister     No Known Problems Sister     No Known Problems Daughter     No Known Problems Daughter     No Known Problems Paternal Aunt     No Known Problems Paternal Aunt     No Known Problems Paternal Aunt     No Known Problems Paternal Aunt       Social History:     E-Cigarette/Vaping    E-Cigarette Use Never User      E-Cigarette/Vaping Substances    Nicotine No     THC No     CBD No     Flavoring No     Other No     Unknown No      Social History     Socioeconomic History    Marital status:   Spouse name: None    Number of children: None    Years of education: None    Highest education level: None   Occupational History    Occupation: Retired   Social Needs    Financial resource strain: Not hard at all   Bambi-Phi insecurity     Worry: Never true     Inability: Never true   ACSIAN needs     Medical: No     Non-medical: No   Tobacco Use    Smoking status: Former Smoker     Packs/day: 20 00     Years: 10 00     Pack years: 200 00     Types: Cigarettes     Last attempt to quit:      Years since quittin 7    Smokeless tobacco: Never Used    Tobacco comment: no passive smoked exposure    Substance and Sexual Activity    Alcohol use: Yes     Comment: Socially    Drug use: No    Sexual activity: Not Currently   Lifestyle    Physical activity     Days per week: 3 days     Minutes per session: 60 min    Stress: Only a little   Relationships    Social connections     Talks on phone: More than three times a week     Gets together: More than three times a week     Attends L[employer] service: 1 to 4 times per year     Active member of club or organization: No     Attends meetings of clubs or organizations: Never     Relationship status:      Intimate partner violence     Fear of current or ex partner: No     Emotionally abused: No     Physically abused: No     Forced sexual activity: No   Other Topics Concern    None   Social History Narrative    None      Medications and Allergies:     Current Outpatient Medications   Medication Sig Dispense Refill    acetaminophen (TYLENOL) 650 mg CR tablet       aspirin 81 mg chewable tablet Chew Daily      atorvastatin (LIPITOR) 10 mg tablet Take 1 tablet (10 mg total) by mouth daily 90 tablet 1    atorvastatin (LIPITOR) 10 mg tablet Take 1 tablet (10 mg total) by mouth daily 90 tablet 3    bimatoprost (LUMIGAN) 0 01 % ophthalmic drops i gtt OD QHS      calcium carbonate-vitamin D (OSCAL-D) 500 mg-200 units per tablet       Carboxymethylcellulose Sodium (Refresh Liquigel) 1 % GEL as needed      cholecalciferol (VITAMIN D3) 1,000 units tablet Take 1,000 Units by mouth daily      diazepam (VALIUM) 2 mg tablet Take 2 5 tablets (5 mg total) by mouth every 12 (twelve) hours as needed (vertigo) for up to 4 doses 4 tablet 0    levothyroxine 112 mcg tablet TAKE 1 TABLET EVERY DAY 90 tablet 0    lisinopril (ZESTRIL) 10 mg tablet Take by mouth Daily      LORazepam (ATIVAN) 0 5 mg tablet Take 1 tablet (0 5 mg total) by mouth 2 (two) times a day as needed for anxiety 60 tablet 0    losartan (COZAAR) 50 mg tablet Take 1 tablet (50 mg total) by mouth daily 90 tablet 1    Lutein 20 MG TABS 1 tablet by mouth daily      meclizine (ANTIVERT) 12 5 MG tablet Take 1 tablet (12 5 mg total) by mouth 3 (three) times a day as needed for dizziness for up to 10 doses 30 tablet 0    Multiple Vitamins-Minerals (PRESERVISION AREDS PO)       Naproxen Sodium (ALEVE) 220 MG CAPS i by oral route PRN as needed      pravastatin (PRAVACHOL) 40 mg tablet Take by mouth Daily      pyridoxine (B-6) 100 MG tablet       ranibizumab (Lucentis) 0 3 mg/0 05mL 0 3 mg by Intravitreal route once       Suprep Bowel Prep Kit 17 5-3 13-1 6 GM/177ML SOLN Use as directed      venlafaxine (EFFEXOR-XR) 75 mg 24 hr capsule TAKE 1 CAPSULE EVERY DAY 90 capsule 1     No current facility-administered medications for this visit        Allergies   Allergen Reactions    Sulfa Antibiotics Rash      Immunizations:     Immunization History   Administered Date(s) Administered    Fluzone Split Quad 0 25 mL 10/22/2015    INFLUENZA 10/22/2015, 11/02/2017, 10/30/2018, 10/31/2019    Influenza Split 10/17/2013    Influenza Split High Dose Preservative Free IM 10/28/2016, 10/31/2019    Influenza TIV (IM) 10/04/2012, 10/30/2014    Influenza, high dose seasonal 0 7 mL 10/30/2018    Pneumococcal Polysaccharide PPV23 01/01/2014    Td (adult), adsorbed 03/13/2015    Zoster 02/23/2011 Health Maintenance: There are no preventive care reminders to display for this patient  Topic Date Due    DTaP,Tdap,and Td Vaccines (1 - Tdap) 03/25/1966    Influenza Vaccine  07/01/2020      Medicare Health Risk Assessment:     /100 (BP Location: Left arm, Patient Position: Sitting, Cuff Size: Adult)   Pulse 100   Temp 98 4 °F (36 9 °C)   Resp 18   Ht 5' 1" (1 549 m)   Wt 74 4 kg (164 lb)   SpO2 98%   BMI 30 99 kg/m²      Mayela Bagley is here for her Subsequent Wellness visit  Health Risk Assessment:   Patient rates overall health as very good  Patient feels that their physical health rating is same  Eyesight was rated as same  Hearing was rated as same  Patient feels that their emotional and mental health rating is same  Pain experienced in the last 7 days has been some  Patient's pain rating has been 6/10  Patient states that she has experienced no weight loss or gain in last 6 months  Depression Screening:   PHQ-2 Score: 0  PHQ-9 Score: 0      Fall Risk Screening: In the past year, patient has experienced: history of falling in past year    Number of falls: 1  Injured during fall?: No    Feels unsteady when standing or walking?: No    Worried about falling?: No      Urinary Incontinence Screening:   Patient has not leaked urine accidently in the last six months  Home Safety:  Patient does not have trouble with stairs inside or outside of their home  Patient has working smoke alarms and has working carbon monoxide detector  Home safety hazards include: none  Nutrition:   Current diet is Low Cholesterol, Low Saturated Fat, No Added Salt, Low Carb and Limited junk food  Attending weight watchers  Medications:   Patient is currently taking over-the-counter supplements  OTC medications include: see medication list  Patient is able to manage medications       Activities of Daily Living (ADLs)/Instrumental Activities of Daily Living (IADLs):   Walk and transfer into and out of bed and chair?: Yes  Dress and groom yourself?: Yes    Bathe or shower yourself?: Yes    Feed yourself? Yes  Do your laundry/housekeeping?: Yes  Manage your money, pay your bills and track your expenses?: Yes  Make your own meals?: Yes    Do your own shopping?: Yes    Previous Hospitalizations:   Any hospitalizations or ED visits within the last 12 months?: Yes    How many hospitalizations have you had in the last year?: 1-2    Hospitalization Comments: [facility]    Advance Care Planning:     Durable POA for healthcare:  Yes    Advanced directive: Yes      PREVENTIVE SCREENINGS      Cardiovascular Screening:    General: Screening Not Indicated and History Lipid Disorder      Diabetes Screening:     General: Screening Current      Breast Cancer Screening:     General: Screening Current      Cervical Cancer Screening:    General: Screening Not Indicated      Lung Cancer Screening:     General: Screening Not Indicated      Marta Esteves MD

## 2020-09-24 ENCOUNTER — TELEPHONE (OUTPATIENT)
Dept: FAMILY MEDICINE CLINIC | Facility: CLINIC | Age: 75
End: 2020-09-24

## 2020-09-24 NOTE — TELEPHONE ENCOUNTER
Pt called in complaining of bruising on her right arm 3-4 inches big  Pt recently had a x-ray done (results in chart please review and advise) pt is unsure where the bruising is coming from  Pt mentioned pain as well  Pt is currently taking ibuprofen for the pain which isn't helping  Pt is requesting a suggestion for the pain  Pt sees Daly Goins thank you

## 2020-09-24 NOTE — TELEPHONE ENCOUNTER
Recommend Tylenol with either heat or cold compress whichever feels better and if not improving she should be seen

## 2020-10-08 DIAGNOSIS — E03.9 HYPOTHYROIDISM, UNSPECIFIED TYPE: ICD-10-CM

## 2020-10-08 RX ORDER — LEVOTHYROXINE SODIUM 112 UG/1
112 TABLET ORAL DAILY
Qty: 90 TABLET | Refills: 0 | Status: SHIPPED | OUTPATIENT
Start: 2020-10-08 | End: 2021-02-11 | Stop reason: SDUPTHER

## 2020-10-22 ENCOUNTER — OFFICE VISIT (OUTPATIENT)
Dept: FAMILY MEDICINE CLINIC | Facility: CLINIC | Age: 75
End: 2020-10-22
Payer: MEDICARE

## 2020-10-22 ENCOUNTER — TELEPHONE (OUTPATIENT)
Dept: FAMILY MEDICINE CLINIC | Facility: CLINIC | Age: 75
End: 2020-10-22

## 2020-10-22 VITALS
TEMPERATURE: 98.5 F | WEIGHT: 166.8 LBS | HEIGHT: 61 IN | RESPIRATION RATE: 20 BRPM | SYSTOLIC BLOOD PRESSURE: 160 MMHG | OXYGEN SATURATION: 98 % | BODY MASS INDEX: 31.49 KG/M2 | HEART RATE: 99 BPM | DIASTOLIC BLOOD PRESSURE: 90 MMHG

## 2020-10-22 DIAGNOSIS — M19.91 PRIMARY OSTEOARTHRITIS, UNSPECIFIED SITE: Primary | ICD-10-CM

## 2020-10-22 DIAGNOSIS — I10 HYPERTENSION, UNSPECIFIED TYPE: ICD-10-CM

## 2020-10-22 PROCEDURE — 99213 OFFICE O/P EST LOW 20 MIN: CPT | Performed by: FAMILY MEDICINE

## 2020-10-22 RX ORDER — LOSARTAN POTASSIUM AND HYDROCHLOROTHIAZIDE 12.5; 5 MG/1; MG/1
1 TABLET ORAL DAILY
Qty: 30 TABLET | Refills: 0 | Status: SHIPPED | OUTPATIENT
Start: 2020-10-22 | End: 2020-11-13

## 2020-10-22 RX ORDER — PREDNISONE 10 MG/1
TABLET ORAL
Qty: 40 TABLET | Refills: 0 | Status: SHIPPED | OUTPATIENT
Start: 2020-10-22 | End: 2021-01-06

## 2020-10-23 ENCOUNTER — PROCEDURE VISIT (OUTPATIENT)
Dept: FAMILY MEDICINE CLINIC | Facility: CLINIC | Age: 75
End: 2020-10-23
Payer: MEDICARE

## 2020-10-23 VITALS
DIASTOLIC BLOOD PRESSURE: 94 MMHG | WEIGHT: 162.4 LBS | HEART RATE: 75 BPM | BODY MASS INDEX: 30.69 KG/M2 | SYSTOLIC BLOOD PRESSURE: 150 MMHG

## 2020-10-23 DIAGNOSIS — Z20.822 EXPOSURE TO COVID-19 VIRUS: ICD-10-CM

## 2020-10-23 DIAGNOSIS — Z20.822 EXPOSURE TO COVID-19 VIRUS: Primary | ICD-10-CM

## 2020-10-23 PROCEDURE — 99213 OFFICE O/P EST LOW 20 MIN: CPT | Performed by: FAMILY MEDICINE

## 2020-10-23 PROCEDURE — U0003 INFECTIOUS AGENT DETECTION BY NUCLEIC ACID (DNA OR RNA); SEVERE ACUTE RESPIRATORY SYNDROME CORONAVIRUS 2 (SARS-COV-2) (CORONAVIRUS DISEASE [COVID-19]), AMPLIFIED PROBE TECHNIQUE, MAKING USE OF HIGH THROUGHPUT TECHNOLOGIES AS DESCRIBED BY CMS-2020-01-R: HCPCS | Performed by: FAMILY MEDICINE

## 2020-10-24 LAB — SARS-COV-2 RNA SPEC QL NAA+PROBE: NOT DETECTED

## 2020-10-26 ENCOUNTER — TELEPHONE (OUTPATIENT)
Dept: FAMILY MEDICINE CLINIC | Facility: CLINIC | Age: 75
End: 2020-10-26

## 2020-10-29 ENCOUNTER — HOSPITAL ENCOUNTER (OUTPATIENT)
Dept: MAMMOGRAPHY | Facility: MEDICAL CENTER | Age: 75
Discharge: HOME | End: 2020-10-29
Payer: MEDICARE

## 2020-10-29 ENCOUNTER — CONSULT (OUTPATIENT)
Dept: OBGYN CLINIC | Facility: MEDICAL CENTER | Age: 75
End: 2020-10-29
Payer: MEDICARE

## 2020-10-29 VITALS — BODY MASS INDEX: 30.58 KG/M2 | WEIGHT: 162 LBS | HEIGHT: 61 IN

## 2020-10-29 VITALS
WEIGHT: 162 LBS | BODY MASS INDEX: 30.61 KG/M2 | HEART RATE: 75 BPM | TEMPERATURE: 97.8 F | SYSTOLIC BLOOD PRESSURE: 135 MMHG | DIASTOLIC BLOOD PRESSURE: 80 MMHG

## 2020-10-29 DIAGNOSIS — M79.642 PAIN IN BOTH HANDS: ICD-10-CM

## 2020-10-29 DIAGNOSIS — Z12.31 VISIT FOR SCREENING MAMMOGRAM: ICD-10-CM

## 2020-10-29 DIAGNOSIS — M25.511 CHRONIC RIGHT SHOULDER PAIN: Primary | ICD-10-CM

## 2020-10-29 DIAGNOSIS — M79.641 PAIN IN BOTH HANDS: ICD-10-CM

## 2020-10-29 DIAGNOSIS — M79.601 PAIN OF RIGHT ARM: ICD-10-CM

## 2020-10-29 DIAGNOSIS — G89.29 CHRONIC RIGHT SHOULDER PAIN: Primary | ICD-10-CM

## 2020-10-29 PROCEDURE — 77067 SCR MAMMO BI INCL CAD: CPT

## 2020-10-29 PROCEDURE — 99204 OFFICE O/P NEW MOD 45 MIN: CPT | Performed by: ORTHOPAEDIC SURGERY

## 2020-10-29 PROCEDURE — 77063 BREAST TOMOSYNTHESIS BI: CPT

## 2020-11-03 ENCOUNTER — OFFICE VISIT (OUTPATIENT)
Dept: OBGYN CLINIC | Facility: MEDICAL CENTER | Age: 75
End: 2020-11-03
Payer: MEDICARE

## 2020-11-03 VITALS — HEIGHT: 61 IN | BODY MASS INDEX: 30.58 KG/M2 | TEMPERATURE: 97.6 F | WEIGHT: 162 LBS

## 2020-11-03 DIAGNOSIS — M75.21 BICIPITAL TENDINITIS OF RIGHT SHOULDER: ICD-10-CM

## 2020-11-03 DIAGNOSIS — M25.511 CHRONIC RIGHT SHOULDER PAIN: Primary | ICD-10-CM

## 2020-11-03 DIAGNOSIS — G89.29 CHRONIC RIGHT SHOULDER PAIN: ICD-10-CM

## 2020-11-03 DIAGNOSIS — M25.511 CHRONIC RIGHT SHOULDER PAIN: ICD-10-CM

## 2020-11-03 DIAGNOSIS — G89.29 CHRONIC RIGHT SHOULDER PAIN: Primary | ICD-10-CM

## 2020-11-03 PROCEDURE — 99213 OFFICE O/P EST LOW 20 MIN: CPT | Performed by: STUDENT IN AN ORGANIZED HEALTH CARE EDUCATION/TRAINING PROGRAM

## 2020-11-03 RX ORDER — DEXAMETHASONE SODIUM PHOSPHATE 4 MG/ML
4 INJECTION, SOLUTION INTRA-ARTICULAR; INTRALESIONAL; INTRAMUSCULAR; INTRAVENOUS; SOFT TISSUE AS NEEDED
Qty: 30 ML | Refills: 0 | Status: SHIPPED | OUTPATIENT
Start: 2020-11-03 | End: 2020-11-03 | Stop reason: CLARIF

## 2020-11-03 RX ORDER — DEXAMETHASONE SODIUM PHOSPHATE 4 MG/ML
4 INJECTION, SOLUTION INTRA-ARTICULAR; INTRALESIONAL; INTRAMUSCULAR; INTRAVENOUS; SOFT TISSUE AS NEEDED
Qty: 30 ML | Refills: 0 | Status: SHIPPED | OUTPATIENT
Start: 2020-11-03 | End: 2020-11-14

## 2020-11-05 ENCOUNTER — TELEPHONE (OUTPATIENT)
Dept: OBGYN CLINIC | Facility: HOSPITAL | Age: 75
End: 2020-11-05

## 2020-11-06 RX ORDER — TESTOSTERONE CYPIONATE 200 MG/ML
INJECTION INTRAMUSCULAR
Qty: 30 ML | Refills: 0 | OUTPATIENT
Start: 2020-11-06

## 2020-11-10 ENCOUNTER — TELEPHONE (OUTPATIENT)
Dept: OBGYN CLINIC | Facility: HOSPITAL | Age: 75
End: 2020-11-10

## 2020-11-13 DIAGNOSIS — I10 HYPERTENSION, UNSPECIFIED TYPE: ICD-10-CM

## 2020-11-13 RX ORDER — LOSARTAN POTASSIUM AND HYDROCHLOROTHIAZIDE 12.5; 5 MG/1; MG/1
TABLET ORAL
Qty: 30 TABLET | Refills: 0 | Status: SHIPPED | OUTPATIENT
Start: 2020-11-13 | End: 2020-11-19 | Stop reason: SDUPTHER

## 2020-11-14 DIAGNOSIS — M75.21 BICIPITAL TENDINITIS OF RIGHT SHOULDER: Primary | ICD-10-CM

## 2020-11-14 RX ORDER — DEXAMETHASONE SODIUM PHOSPHATE 4 MG/ML
4 INJECTION, SOLUTION INTRA-ARTICULAR; INTRALESIONAL; INTRAMUSCULAR; INTRAVENOUS; SOFT TISSUE AS NEEDED
Qty: 10 ML | Refills: 1 | Status: SHIPPED | OUTPATIENT
Start: 2020-11-14 | End: 2021-08-06

## 2020-11-19 ENCOUNTER — OFFICE VISIT (OUTPATIENT)
Dept: FAMILY MEDICINE CLINIC | Facility: CLINIC | Age: 75
End: 2020-11-19
Payer: MEDICARE

## 2020-11-19 VITALS
OXYGEN SATURATION: 98 % | HEIGHT: 61 IN | DIASTOLIC BLOOD PRESSURE: 90 MMHG | HEART RATE: 85 BPM | BODY MASS INDEX: 31.34 KG/M2 | RESPIRATION RATE: 18 BRPM | WEIGHT: 166 LBS | SYSTOLIC BLOOD PRESSURE: 140 MMHG | TEMPERATURE: 97.7 F

## 2020-11-19 DIAGNOSIS — I10 HYPERTENSION, UNSPECIFIED TYPE: ICD-10-CM

## 2020-11-19 PROCEDURE — 99213 OFFICE O/P EST LOW 20 MIN: CPT | Performed by: FAMILY MEDICINE

## 2020-11-19 RX ORDER — LOSARTAN POTASSIUM AND HYDROCHLOROTHIAZIDE 12.5; 5 MG/1; MG/1
1 TABLET ORAL DAILY
Qty: 90 TABLET | Refills: 1 | Status: SHIPPED | OUTPATIENT
Start: 2020-11-19 | End: 2021-04-29 | Stop reason: SDUPTHER

## 2020-11-27 ENCOUNTER — EVALUATION (OUTPATIENT)
Dept: PHYSICAL THERAPY | Facility: MEDICAL CENTER | Age: 75
End: 2020-11-27
Payer: MEDICARE

## 2020-11-27 DIAGNOSIS — M75.21 BICIPITAL TENDINITIS OF RIGHT SHOULDER: ICD-10-CM

## 2020-11-27 DIAGNOSIS — G89.29 CHRONIC RIGHT SHOULDER PAIN: Primary | ICD-10-CM

## 2020-11-27 DIAGNOSIS — M25.511 CHRONIC RIGHT SHOULDER PAIN: Primary | ICD-10-CM

## 2020-11-27 PROCEDURE — 97161 PT EVAL LOW COMPLEX 20 MIN: CPT | Performed by: PHYSICAL THERAPIST

## 2020-12-02 ENCOUNTER — OFFICE VISIT (OUTPATIENT)
Dept: PHYSICAL THERAPY | Facility: MEDICAL CENTER | Age: 75
End: 2020-12-02
Payer: MEDICARE

## 2020-12-02 DIAGNOSIS — M75.21 BICIPITAL TENDINITIS OF RIGHT SHOULDER: ICD-10-CM

## 2020-12-02 DIAGNOSIS — G89.29 CHRONIC RIGHT SHOULDER PAIN: Primary | ICD-10-CM

## 2020-12-02 DIAGNOSIS — M25.511 CHRONIC RIGHT SHOULDER PAIN: Primary | ICD-10-CM

## 2020-12-02 PROCEDURE — 97112 NEUROMUSCULAR REEDUCATION: CPT | Performed by: PHYSICAL THERAPIST

## 2020-12-02 PROCEDURE — 97110 THERAPEUTIC EXERCISES: CPT | Performed by: PHYSICAL THERAPIST

## 2020-12-02 PROCEDURE — 97140 MANUAL THERAPY 1/> REGIONS: CPT | Performed by: PHYSICAL THERAPIST

## 2020-12-09 ENCOUNTER — OFFICE VISIT (OUTPATIENT)
Dept: PHYSICAL THERAPY | Facility: MEDICAL CENTER | Age: 75
End: 2020-12-09
Payer: MEDICARE

## 2020-12-09 DIAGNOSIS — M75.21 BICIPITAL TENDINITIS OF RIGHT SHOULDER: ICD-10-CM

## 2020-12-09 DIAGNOSIS — G89.29 CHRONIC RIGHT SHOULDER PAIN: Primary | ICD-10-CM

## 2020-12-09 DIAGNOSIS — M25.511 CHRONIC RIGHT SHOULDER PAIN: Primary | ICD-10-CM

## 2020-12-09 PROCEDURE — 97140 MANUAL THERAPY 1/> REGIONS: CPT | Performed by: PHYSICAL THERAPIST

## 2020-12-09 PROCEDURE — 97112 NEUROMUSCULAR REEDUCATION: CPT | Performed by: PHYSICAL THERAPIST

## 2021-01-06 ENCOUNTER — OFFICE VISIT (OUTPATIENT)
Dept: OBGYN CLINIC | Facility: MEDICAL CENTER | Age: 76
End: 2021-01-06
Payer: MEDICARE

## 2021-01-06 VITALS — BODY MASS INDEX: 31.34 KG/M2 | WEIGHT: 166 LBS | TEMPERATURE: 96.9 F | HEIGHT: 61 IN

## 2021-01-06 DIAGNOSIS — S76.311A STRAIN OF HAMSTRING MUSCLE, RIGHT, INITIAL ENCOUNTER: ICD-10-CM

## 2021-01-06 DIAGNOSIS — M25.561 ACUTE PAIN OF RIGHT KNEE: Primary | ICD-10-CM

## 2021-01-06 PROCEDURE — 99213 OFFICE O/P EST LOW 20 MIN: CPT | Performed by: STUDENT IN AN ORGANIZED HEALTH CARE EDUCATION/TRAINING PROGRAM

## 2021-01-06 RX ORDER — METHOCARBAMOL 500 MG/1
500 TABLET, FILM COATED ORAL
Qty: 30 TABLET | Refills: 0 | Status: SHIPPED | OUTPATIENT
Start: 2021-01-06 | End: 2021-08-06

## 2021-01-06 NOTE — PROGRESS NOTES
1  Acute pain of right knee  Knee Sleeves    Ambulatory referral to Physical Therapy    methocarbamol (ROBAXIN) 500 mg tablet   2  Strain of hamstring muscle, right, initial encounter  Ambulatory referral to Physical Therapy     Orders Placed This Encounter   Procedures    Knee Sleeves    Ambulatory referral to Physical Therapy        Imaging Studies (I personally reviewed images in PACS and report):    Prior imagin  X-ray right knee 2020:  No acute osseous abnormality    IMPRESSION:  Acute right knee pain - mild intensity per patient  Posterolateral knee corner strain/sprain initially that affected mechanics of her gait and has subsequently developed a patellofemoral knee pain syndrome  Gait dysfunction of RLE    Date of Injury:  12/10/2020  Follow up interval:  2 weeks, 3 days    PLAN:  - Clinical exam and radiographic imaging reviewed with patient today, with impression as above  - While patient notes progressively improving pain, I have referred to formal PT as she is not satisfied with her progress as she wants to be more active than her standpoint   - In regards to pain control and recommended as needed use of NSAIDs/acetaminophen  I have refilled her Robaxin at a lower dose of 500mg and recommend only to take at night as needed for pain/muscle spasms   - Knee sleeve brace prescribed today and recommended to use during activity/work only    Return in about 6 weeks (around 2021)  CHIEF COMPLAINT:  Right knee pain    HPI:  Cayetano Weaver is a 66 y o  female  who presents for       Visit 2021 :  Initial evaluation right knee pain:  Patient concurs with [facility] note as per below-patient notes an injury on 12/10/2020 transitioning from lying down to standing and feeling a pulling pain of the back of her knee  This pain limited her range of motion of her knee and caused her to limp for a few weeks    While the pain has progressively improved to the posterior knee she has also noticed new anterior knee pain in the past week with intermittent swelling  Pain described as a pulling/tight/aching pain of mild intensity, intermittent, nonradiating  Pain is aggravated with prolonged ambulation  Denies clicking/popping, numbness/tingling  Patient reports improved pain since taking tapering prednisone  She also has been able to sleep during the night with robaxin, but does cause drowsiness during the day  While pain has been improving, patient reports that she is active during the day and is concerned for long-term issues due to dysfunction of her gait and knee pain    [facility] note summary 12/11/2020  Patient reported 1 day of right knee pain  While transitioning from lying down to standing  Reported limping  Right knee radiographs were done, which is reported as above  Patient prescribed tapering dose of prednisone and Robaxin 750mg t i d  Review of Systems   Constitutional: Negative for chills, fatigue and fever  Respiratory: Negative for cough and shortness of breath  Gastrointestinal: Negative for abdominal pain, nausea and vomiting  Musculoskeletal:        As per HPI   Skin: Negative for rash and wound     Neurological:        As per HPI         Medical, Surgical, Family, and Social History    Past Medical History:   Diagnosis Date    Depression     Diverticulosis     Glaucoma 2015    medication    Hemorrhoids 12/2009    Hyperlipidemia     Hypertension     Hypothyroidism      Past Surgical History:   Procedure Laterality Date    CARPAL TUNNEL RELEASE Bilateral     2017, 2018    HYSTERECTOMY      age 62    OOPHORECTOMY Bilateral age 62     Social History   Social History     Substance and Sexual Activity   Alcohol Use Yes    Comment: Socially     Social History     Substance and Sexual Activity   Drug Use No     Social History     Tobacco Use   Smoking Status Former Smoker    Packs/day: 20 00    Years: 10 00    Pack years: 200 00    Types: Cigarettes    Quit date: 12    Years since quittin 0   Smokeless Tobacco Never Used   Tobacco Comment    no passive smoked exposure      Family History   Problem Relation Age of Onset    Hypertension Mother     Diabetes Father     Brain cancer Sister     No Known Problems Daughter     No Known Problems Maternal Grandmother     No Known Problems Maternal Grandfather     No Known Problems Paternal Grandmother     No Known Problems Paternal Grandfather     No Known Problems Sister     No Known Problems Sister     No Known Problems Sister     No Known Problems Daughter     No Known Problems Daughter     No Known Problems Paternal Aunt     No Known Problems Paternal Aunt     No Known Problems Paternal Aunt     No Known Problems Paternal Aunt      Allergies   Allergen Reactions    Sulfa Antibiotics Rash          Physical Exam  Temp (!) 96 9 °F (36 1 °C)   Ht 5' 1" (1 549 m)   Wt 75 3 kg (166 lb)   BMI 31 37 kg/m²     Constitutional:  see vital signs  Gen: well-developed, normocephalic/atraumatic, well-groomed  Eyes: No inflammation or discharge of conjunctiva or lids; sclera clear   Pharynx: no inflammation, lesion, or mass of lips  Neck: supple, no masses, non-distended  MSK: no inflammation, lesion, mass, or clubbing of nails and digits except for other than mentioned below  SKIN: no visible rashes or skin lesions  Pulmonary/Chest: Effort normal  No respiratory distress     NEURO: cranial nerves grossly intact  PSYCH:  Alert and oriented to person, place, and time; recent and remote memory intact; mood normal, no depression, anxiety, or agitation, judgment and insight good and intact     Ortho Exam  Gait: antalgic and favoring putting weight on LLE    Right Knee Exam:  Erythema: no  Swelling: no  Increased Warmth: Mild and localized infrapatellar swelling  Tenderness: +MJL, +medial/lateral patellofemoral joint line  ROM: 0-110 with limited flexion d/t pain (0-130 on left knee)  Patellar Displacement: none  Patellar Apprehension: negative  Patellar Grind: negative  Lachman's: negative  Anterior Drawer: negative  Varus laxity: negative  Valgus laxity: negative  Ginny: negative   Dial Test:  Negative at 30 and 90° bilaterally

## 2021-01-13 ENCOUNTER — OFFICE VISIT (OUTPATIENT)
Dept: OBGYN CLINIC | Facility: MEDICAL CENTER | Age: 76
End: 2021-01-13
Payer: MEDICARE

## 2021-01-13 VITALS — BODY MASS INDEX: 31.34 KG/M2 | HEIGHT: 61 IN | WEIGHT: 166 LBS | TEMPERATURE: 95.8 F

## 2021-01-13 DIAGNOSIS — G89.29 CHRONIC RIGHT SHOULDER PAIN: ICD-10-CM

## 2021-01-13 DIAGNOSIS — M75.21 BICIPITAL TENDINITIS OF RIGHT SHOULDER: Primary | ICD-10-CM

## 2021-01-13 DIAGNOSIS — M25.511 CHRONIC RIGHT SHOULDER PAIN: ICD-10-CM

## 2021-01-13 PROCEDURE — 99213 OFFICE O/P EST LOW 20 MIN: CPT | Performed by: STUDENT IN AN ORGANIZED HEALTH CARE EDUCATION/TRAINING PROGRAM

## 2021-01-13 NOTE — PROGRESS NOTES
1  Bicipital tendinitis of right shoulder     2  Chronic right shoulder pain       No orders of the defined types were placed in this encounter  Imaging Studies (I personally reviewed images in PACS and report):    Prior imagin  X-ray right shoulder 2019:  Mild glenohumeral osteoarthritis    IMPRESSION:  Chronic right anterior shoulder pain - resolved since last visit   Right-sided glenohumeral osteoarthritis  Right bicipital tendonopathy versus partial bicipital degenerative tear - strength preserved and equal to contralateral arm  DDx: Glenoid labral injury, shoulder impingement syndrome, rotator cuff strain      PLAN:  Clinical findings of prior radiographic imaging reviewed with patient today, consistent with impression as above  Patient has noted complete resolution right shoulder/arm pain since last visit since starting formal PT for this issue  At this time, I recommended patient can continue home exercises from PT for prevention of this issue in the future  Return if symptoms worsen or fail to improve  CHIEF COMPLAINT:  Follow-up right arm pain    HPI:  Alla Patel is a 66 y o  female  who presents for       Visit 2021 : Follow-up right arm pain:  Resolved  Patient has been attending PT since her last visit, however she notes that she was not able to use the Decadron prescription given last visit  Denies any pain of her right shoulder/arm and has full ROM and strength compared to her left arm  Denies swelling, skin color changes, numbness/tingling, clicking/popping of her RUE  Denies new injuries since last visit  She has not required any pain medications in regards to her RUE  Review of Systems   Constitutional: Negative for chills, fatigue and fever  Respiratory: Negative for cough and shortness of breath  Gastrointestinal: Negative for abdominal pain, nausea and vomiting  Musculoskeletal:        As per HPI   Skin: Negative for rash and wound  Neurological:        As per HPI         Medical, Surgical, Family, and Social History    Past Medical History:   Diagnosis Date    Depression     Diverticulosis     Glaucoma 2015    medication    Hemorrhoids 2009    Hyperlipidemia     Hypertension     Hypothyroidism      Past Surgical History:   Procedure Laterality Date    CARPAL TUNNEL RELEASE Bilateral     , 2018    HYSTERECTOMY      age 62    OOPHORECTOMY Bilateral age 62     Social History   Social History     Substance and Sexual Activity   Alcohol Use Yes    Comment: Socially     Social History     Substance and Sexual Activity   Drug Use No     Social History     Tobacco Use   Smoking Status Former Smoker    Packs/day: 20 00    Years: 10 00    Pack years: 200 00    Types: Cigarettes    Quit date: 12    Years since quittin 0   Smokeless Tobacco Never Used   Tobacco Comment    no passive smoked exposure      Family History   Problem Relation Age of Onset    Hypertension Mother     Diabetes Father     Brain cancer Sister     No Known Problems Daughter     No Known Problems Maternal Grandmother     No Known Problems Maternal Grandfather     No Known Problems Paternal Grandmother     No Known Problems Paternal Grandfather     No Known Problems Sister     No Known Problems Sister     No Known Problems Sister     No Known Problems Daughter     No Known Problems Daughter     No Known Problems Paternal Aunt     No Known Problems Paternal Aunt     No Known Problems Paternal Aunt     No Known Problems Paternal Aunt      Allergies   Allergen Reactions    Sulfa Antibiotics Rash          Physical Exam  Temp (!) 95 8 °F (35 4 °C)   Ht 5' 1" (1 549 m)   Wt 75 3 kg (166 lb)   BMI 31 37 kg/m²     Constitutional:  see vital signs  Gen: well-developed, normocephalic/atraumatic, well-groomed  Eyes: No inflammation or discharge of conjunctiva or lids; sclera clear   Pharynx: no inflammation, lesion, or mass of lips  Neck: supple, no masses, non-distended  MSK: no inflammation, lesion, mass, or clubbing of nails and digits except for other than mentioned below  SKIN: no visible rashes or skin lesions  Pulmonary/Chest: Effort normal  No respiratory distress     NEURO: cranial nerves grossly intact  PSYCH:  Alert and oriented to person, place, and time; recent and remote memory intact; mood normal, no depression, anxiety, or agitation, judgment and insight good and intact     Ortho Exam  Shoulder exam:       RIGHT    Inspection Erythema None     Swelling None     Increased Warmth None    Rotator cuff ER 5/5     IR 5/5     Abduction 5/5    ROM      Abduction 170     ER0 60     ER90 90     IR90 40     IRb T7    TTP:      Special Tests: O'Briens  (FF 90, add 10, resist thumbs up-, resist thumbs down+) Negative     Cross body Adduction Negative     Speeds  Negative     Yergason's Negative     Karen's Negative     Neer Negative     Perez Negative        UE NV Exam: +2 Radial pulses bilaterally  Sensation intact to light touch C5-T1 bilaterally, Radial/median/ulnar nerve motor intact

## 2021-01-14 ENCOUNTER — APPOINTMENT (OUTPATIENT)
Dept: LAB | Facility: HOSPITAL | Age: 76
End: 2021-01-14
Payer: MEDICARE

## 2021-01-14 DIAGNOSIS — H40.9 GLAUCOMA OF BOTH EYES, UNSPECIFIED GLAUCOMA TYPE: ICD-10-CM

## 2021-01-14 DIAGNOSIS — E78.2 MIXED HYPERLIPIDEMIA: ICD-10-CM

## 2021-01-14 DIAGNOSIS — E03.9 HYPOTHYROIDISM, UNSPECIFIED TYPE: ICD-10-CM

## 2021-01-14 DIAGNOSIS — M19.91 PRIMARY OSTEOARTHRITIS, UNSPECIFIED SITE: ICD-10-CM

## 2021-01-14 DIAGNOSIS — M79.601 PAIN OF RIGHT ARM: ICD-10-CM

## 2021-01-14 DIAGNOSIS — I10 ESSENTIAL HYPERTENSION: ICD-10-CM

## 2021-01-14 LAB
ALBUMIN SERPL BCP-MCNC: 3.3 G/DL (ref 3.5–5)
ALP SERPL-CCNC: 51 U/L (ref 46–116)
ALT SERPL W P-5'-P-CCNC: 29 U/L (ref 12–78)
ANION GAP SERPL CALCULATED.3IONS-SCNC: 5 MMOL/L (ref 4–13)
AST SERPL W P-5'-P-CCNC: 22 U/L (ref 5–45)
BACTERIA UR QL AUTO: ABNORMAL /HPF
BASOPHILS # BLD AUTO: 0.04 THOUSANDS/ΜL (ref 0–0.1)
BASOPHILS NFR BLD AUTO: 1 % (ref 0–1)
BILIRUB SERPL-MCNC: 0.8 MG/DL (ref 0.2–1)
BILIRUB UR QL STRIP: NEGATIVE
BUN SERPL-MCNC: 22 MG/DL (ref 5–25)
CALCIUM ALBUM COR SERPL-MCNC: 9.7 MG/DL (ref 8.3–10.1)
CALCIUM SERPL-MCNC: 9.1 MG/DL (ref 8.3–10.1)
CHLORIDE SERPL-SCNC: 107 MMOL/L (ref 100–108)
CHOLEST SERPL-MCNC: 191 MG/DL (ref 50–200)
CLARITY UR: CLEAR
CO2 SERPL-SCNC: 31 MMOL/L (ref 21–32)
COLOR UR: YELLOW
CREAT SERPL-MCNC: 0.69 MG/DL (ref 0.6–1.3)
EOSINOPHIL # BLD AUTO: 0.19 THOUSAND/ΜL (ref 0–0.61)
EOSINOPHIL NFR BLD AUTO: 4 % (ref 0–6)
ERYTHROCYTE [DISTWIDTH] IN BLOOD BY AUTOMATED COUNT: 12.6 % (ref 11.6–15.1)
GFR SERPL CREATININE-BSD FRML MDRD: 84 ML/MIN/1.73SQ M
GLUCOSE P FAST SERPL-MCNC: 96 MG/DL (ref 65–99)
GLUCOSE UR STRIP-MCNC: NEGATIVE MG/DL
HCT VFR BLD AUTO: 44 % (ref 34.8–46.1)
HDLC SERPL-MCNC: 79 MG/DL
HGB BLD-MCNC: 14 G/DL (ref 11.5–15.4)
HGB UR QL STRIP.AUTO: ABNORMAL
IMM GRANULOCYTES # BLD AUTO: 0.02 THOUSAND/UL (ref 0–0.2)
IMM GRANULOCYTES NFR BLD AUTO: 0 % (ref 0–2)
KETONES UR STRIP-MCNC: NEGATIVE MG/DL
LDLC SERPL CALC-MCNC: 100 MG/DL (ref 0–100)
LEUKOCYTE ESTERASE UR QL STRIP: ABNORMAL
LYMPHOCYTES # BLD AUTO: 1.7 THOUSANDS/ΜL (ref 0.6–4.47)
LYMPHOCYTES NFR BLD AUTO: 34 % (ref 14–44)
MCH RBC QN AUTO: 31.4 PG (ref 26.8–34.3)
MCHC RBC AUTO-ENTMCNC: 31.8 G/DL (ref 31.4–37.4)
MCV RBC AUTO: 99 FL (ref 82–98)
MONOCYTES # BLD AUTO: 0.6 THOUSAND/ΜL (ref 0.17–1.22)
MONOCYTES NFR BLD AUTO: 12 % (ref 4–12)
NEUTROPHILS # BLD AUTO: 2.52 THOUSANDS/ΜL (ref 1.85–7.62)
NEUTS SEG NFR BLD AUTO: 49 % (ref 43–75)
NITRITE UR QL STRIP: NEGATIVE
NON-SQ EPI CELLS URNS QL MICRO: ABNORMAL /HPF
NONHDLC SERPL-MCNC: 112 MG/DL
NRBC BLD AUTO-RTO: 0 /100 WBCS
PH UR STRIP.AUTO: 6 [PH]
PLATELET # BLD AUTO: 296 THOUSANDS/UL (ref 149–390)
PMV BLD AUTO: 10 FL (ref 8.9–12.7)
POTASSIUM SERPL-SCNC: 3.9 MMOL/L (ref 3.5–5.3)
PROT SERPL-MCNC: 6.9 G/DL (ref 6.4–8.2)
PROT UR STRIP-MCNC: NEGATIVE MG/DL
RBC # BLD AUTO: 4.46 MILLION/UL (ref 3.81–5.12)
RBC #/AREA URNS AUTO: ABNORMAL /HPF
SODIUM SERPL-SCNC: 143 MMOL/L (ref 136–145)
SP GR UR STRIP.AUTO: 1.02 (ref 1–1.03)
T4 FREE SERPL-MCNC: 1.2 NG/DL (ref 0.76–1.46)
TRIGL SERPL-MCNC: 62 MG/DL
TSH SERPL DL<=0.05 MIU/L-ACNC: 0.75 UIU/ML (ref 0.36–3.74)
UROBILINOGEN UR QL STRIP.AUTO: 0.2 E.U./DL
WBC # BLD AUTO: 5.07 THOUSAND/UL (ref 4.31–10.16)
WBC #/AREA URNS AUTO: ABNORMAL /HPF

## 2021-01-14 PROCEDURE — 84439 ASSAY OF FREE THYROXINE: CPT

## 2021-01-14 PROCEDURE — 36415 COLL VENOUS BLD VENIPUNCTURE: CPT

## 2021-01-14 PROCEDURE — 81001 URINALYSIS AUTO W/SCOPE: CPT | Performed by: SPECIALIST

## 2021-01-14 PROCEDURE — 80053 COMPREHEN METABOLIC PANEL: CPT

## 2021-01-14 PROCEDURE — 80061 LIPID PANEL: CPT

## 2021-01-14 PROCEDURE — 85025 COMPLETE CBC W/AUTO DIFF WBC: CPT

## 2021-01-14 PROCEDURE — 84443 ASSAY THYROID STIM HORMONE: CPT

## 2021-01-28 ENCOUNTER — EVALUATION (OUTPATIENT)
Dept: PHYSICAL THERAPY | Facility: CLINIC | Age: 76
End: 2021-01-28
Payer: MEDICARE

## 2021-01-28 DIAGNOSIS — S76.311A STRAIN OF HAMSTRING MUSCLE, RIGHT, INITIAL ENCOUNTER: ICD-10-CM

## 2021-01-28 DIAGNOSIS — M25.561 ACUTE PAIN OF RIGHT KNEE: Primary | ICD-10-CM

## 2021-01-28 PROCEDURE — 97162 PT EVAL MOD COMPLEX 30 MIN: CPT

## 2021-01-28 NOTE — PROGRESS NOTES
PT Evaluation     Today's date: 2021  Patient name: Day Chacon  : 1945  MRN: [id number]  Referring provider: Fernanda Ernst MD  Dx:   Encounter Diagnosis     ICD-10-CM    1  Acute pain of right knee  M25 561 Ambulatory referral to Physical Therapy   2  Strain of hamstring muscle, right, initial encounter  [phone] Ambulatory referral to Physical Therapy                  Assessment  Assessment details: Day Chacon is a 66 y o  female who presents today to outpatient therapy for evaluation of acute pain of right knee and strain of hamstring muscle, right  Upon assessment today, pt exhibits decreased lower quarter stability; deviations while squatting; patellar hypomobility; decreased (R) knee flex AROM; decreased glute strength; TTP thru the (R) LE; and decreased HS flexibility on (R)  These impairments are contributing to functional limitations with standing; squatting; and transferring  Pt would therefore benefit from PT intervention in order to address the aforementioned deficits so that she can return to her PLOF and function comfortably/safely in her home and surrounding environment  Thank you for the referral! - Alphonso Aschoff, PT, DPT  Impairments: abnormal gait, abnormal or restricted ROM, abnormal movement, impaired balance, impaired physical strength, pain with function and poor body mechanics  Understanding of Dx/Px/POC: good   Prognosis: good    Goals  STG 1: Pt will demonstrate compliance w/ HEP to supplement therapy in 1-2 weeks  STG 2: Pt will demonstrate normalized AROM thru the (R) knee in 2-4 weeks  LTG 1: Pt will be able to squat using proper body mechanics w/ min difficulty in 6-8 weeks  LTG 2: Pt will be able to stand comfortably for 6 hours as when completing work duties w/ min difficulty related to the (R) knee in 6-8 weeks  LTG 3: Pt will be able to transfer from all surfaces in her home in 6-8 weeks w/ min difficulty      Plan  Patient would benefit from: skilled physical therapy  Planned modality interventions: cryotherapy, TENS and unattended electrical stimulation  Planned therapy interventions: abdominal trunk stabilization, postural training, patient education, neuromuscular re-education, joint mobilization, manual therapy, massage, activity modification, balance, body mechanics training, Ordonez taping, strengthening, stretching, therapeutic activities, coordination, flexibility, home exercise program, therapeutic exercise, functional ROM exercises, gait training, balance/weight bearing training and self care  Frequency: 2x week  Duration in weeks: 4  Treatment plan discussed with: patient        Subjective Evaluation    History of Present Illness  Mechanism of injury: Pt states that at the beginning of January, her (L) LE gave out as she was reaching into her closet, causing her to fall onto her (R) LE/injure her (R) knee  She went to Urgent Care later that day and they performed an x-ray and prescribed her a muscle relaxer  Overall, pt states that the pain has improved significantly however she states that the knee still feels a little bit "off " Currently, pt reports difficulty standing prolonged periods of time as when at work; squatting; and transferring  Pt also reports occasional swelling thru the knee  Eases: Brace  Pain  Current pain ratin  At best pain ratin  At worst pain rating: 3  Location: Pt reports pain thru the (R) anterior knee  Patient Goals  Patient goal: Pt would like to feel better  Objective     Palpation     Additional Palpation Details  Mod-severe TTP thru (R) distal-lateral HS, ELIAZAR joint lines, peripatella, and quads  LTG: Min TTP  Active Range of Motion   Left Knee   Flexion: 133 degrees   Extension: WFL    Right Knee   Flexion: 130 degrees with pain  Extension: [facility]    Additional Active Range of Motion Details  HS flexibility min dec on (R), WNL on (L)      Mobility   Patellar Mobility:     Right Knee   Hypomobile: medial, lateral, superior and inferior     Patellar Mobility Comments   Right medial tendon comments: painful  Strength/Myotome Testing     Left Knee   Flexion: 5  Extension: 5    Right Knee   Flexion: 5  Extension: 5    Additional Strength Details  Hip abd strength on (R): , (L): 4-/  LT/5    General Comments:      Knee Comments  Gait: Dec trunk rotation/arm swing ELIAZAR  Squat: NBOS, inc trunk flex, min-mod quad dom  (R) tandem stance: 20 secs, min sway, required multiple attempts  (L) tandem stance: 12 secs, min sway  (R) SLS: 10 secs, unsteady, mod-severe sway  (L) SLS: 10 secs, unsteady, mod-severe sway  Precautions: OA; HTN; depression; hyperlipidemia; macular degeneration; calcification of coronary artery; hypothyroidism    Date             FOTO IE             Re-eval IE                Manuals             (R) knee distraction EOB                                                    Neuro Re-Ed             SLS              Semi tandem stance on foam             KRISTIE*             Standing hip hike                                                                 Ther Ex             Heelslides (R)             SLR             Bridges             Supine HS (S) w/ strap (R)             Clams             S/L Hip abd             Prone quad (S) (R)             Seated HS curls EOB             Standing 3 way hip             Runner's gastroc (S)              TKE             Leg press**             Wall sits*                          Ther Activity             Recumbent bike             Mini squats             Step ups             Sidesteps             Monster walks             STS             Pt edu                          Gait Training                                       Modalities

## 2021-02-01 ENCOUNTER — APPOINTMENT (OUTPATIENT)
Dept: PHYSICAL THERAPY | Facility: CLINIC | Age: 76
End: 2021-02-01
Payer: MEDICARE

## 2021-02-04 ENCOUNTER — OFFICE VISIT (OUTPATIENT)
Dept: PHYSICAL THERAPY | Facility: CLINIC | Age: 76
End: 2021-02-04
Payer: MEDICARE

## 2021-02-04 DIAGNOSIS — M25.561 ACUTE PAIN OF RIGHT KNEE: Primary | ICD-10-CM

## 2021-02-04 DIAGNOSIS — S76.311A STRAIN OF HAMSTRING MUSCLE, RIGHT, INITIAL ENCOUNTER: ICD-10-CM

## 2021-02-04 PROCEDURE — 97530 THERAPEUTIC ACTIVITIES: CPT

## 2021-02-04 PROCEDURE — 97110 THERAPEUTIC EXERCISES: CPT

## 2021-02-04 NOTE — PROGRESS NOTES
Daily Note     Today's date: 2021  Patient name: Naima Foss  : 1945  MRN: [id number]  Referring provider: Rani Patrick MD  Dx:   Encounter Diagnosis     ICD-10-CM    1  Acute pain of right knee  M25 561    2  Strain of hamstring muscle, right, initial encounter  S76 311A        Start Time: 0805  Stop Time: 0850  Total time in clinic (min): 45 minutes  Subjective: Pt arrives to first f/u since IE stating her (R) knee/LE is feeling "good" and that it "usually feels good in the morning but feels tight in the evening "   Pt states she as been on Weight Wat[employer] and has lost weight recently and is "feeling great" about this  Objective: See treatment diary below    Assessment: Implemented exercise diary per PT POC as indicated below  Pt requires occasional verbal + visual cuing for form and sequencing of activities - fair+ carryover  Provided pt education re: DOMS + discussed progressing TE program as tolerated - pt verbalized understanding  Pt would benefit from continued PT to improve (R)LE + knee pain Sx, mobility, and strength  Plan: Cont /c PT POC  Progress as tolerated  Precautions: OA; HTN; depression; hyperlipidemia; macular degeneration; calcification of coronary artery; hypothyroidism    Date            FOTO IE             Re-eval IE                Manuals            (R) knee distraction EOB                                                    Neuro Re-Ed            SLS              Semi tandem stance on foam             KRISTIE*             Standing hip hike                                                                 Ther Ex            Heelslides (R)             SLR  2x10ea           Bridges             Supine HS (S) w/ strap (R)             Clams             S/L Hip abd             Prone quad (S) (R)             Seated HS curls EOB  OTB 20ea (B/L)           Standing 3 way hip  10ea (B/L) foam           Runner's gastroc (S)   15"x5 (R) TKE             Leg press**             Wall sits*                          Ther Activity 1/28 02/04           Recumbent bike  5'            Mini squats             Step ups  1R (B/L) 2x10ea           Sidesteps  3 laps  3 laps YTB           Monster walks             STS             Pt edu                          Gait Training 1/28 02/04                                     Modalities 1/28 02/04                                         Radha Juan, PTA

## 2021-02-08 ENCOUNTER — OFFICE VISIT (OUTPATIENT)
Dept: PHYSICAL THERAPY | Facility: CLINIC | Age: 76
End: 2021-02-08
Payer: MEDICARE

## 2021-02-08 DIAGNOSIS — S76.311A STRAIN OF HAMSTRING MUSCLE, RIGHT, INITIAL ENCOUNTER: ICD-10-CM

## 2021-02-08 DIAGNOSIS — M25.561 ACUTE PAIN OF RIGHT KNEE: Primary | ICD-10-CM

## 2021-02-08 PROCEDURE — 97530 THERAPEUTIC ACTIVITIES: CPT

## 2021-02-08 PROCEDURE — 97110 THERAPEUTIC EXERCISES: CPT

## 2021-02-08 NOTE — PROGRESS NOTES
Daily Note     Today's date: 2021  Patient name: Petrona Damon  : 1945  MRN: [id number]  Referring provider: Yamel Grullon MD  Dx:   Encounter Diagnosis     ICD-10-CM    1  Acute pain of right knee  M25 561    2  Strain of hamstring muscle, right, initial encounter  S76 311A        Start Time: 1630  Stop Time: 1715  Total time in clinic (min): 45 minutes  Subjective: Pt arrives to today's Tx noting "some" improvements in (R) knee + HS pain Sx since LV  Pt has cco "tightness + discomfort" along lateral + distal aspect of (R) HS - pt notices this "every once and a while", most recently when ascending her stairs at home  Objective: See treatment diary below    Assessment: Pt demonstrates poor eccentric control + difficulty maintaining knee ext /c SLR (B/L)  Pt would benefit from continued PT to improve (R)LE + knee pain Sx, mobility, and strength  Plan: Cont /c PT POC  Progress as tolerated  Precautions: OA; HTN; depression; hyperlipidemia; macular degeneration; calcification of coronary artery; hypothyroidism    Date           FOTO IE             Re-eval IE                Manuals           (R) knee distraction EOB                                                    Neuro Re-Ed           SLS              Semi tandem stance on foam             KRISTIE*             Standing hip hike                                                                 Ther Ex           Heelslides (R)             SLR  2x10ea 2x15ea          Bridges   2x10          Supine HS (S) w/ strap (R)             Clams             S/L Hip abd             Prone quad (S) (R)             Seated HS curls EOB  OTB 20ea (B/L) OTB 3"x35 (R)LE          Standing 3 way hip  10ea (B/L) foam 2x15ea (B/L) foam          Runner's gastroc (S)   15"x5 (R) 20"x5 (R)          TKE             Leg press**             Wall sits*                          Ther Activity  Recumbent bike  5'  7'           Mini squats             Step ups  1R(B/L) 2x10ea 1R (B/L) 2x15ea          Sidesteps  3 laps  3 laps YTB YTB   5 laps           Monster walks             STS             Pt edu                          Gait Training 1/28 02/04 02/08                                    Modalities 1/28 02/04 02/08                                        Az Aguero, PTA

## 2021-02-09 DIAGNOSIS — Z23 ENCOUNTER FOR IMMUNIZATION: ICD-10-CM

## 2021-02-11 ENCOUNTER — OFFICE VISIT (OUTPATIENT)
Dept: FAMILY MEDICINE CLINIC | Facility: CLINIC | Age: 76
End: 2021-02-11
Payer: MEDICARE

## 2021-02-11 VITALS
DIASTOLIC BLOOD PRESSURE: 70 MMHG | RESPIRATION RATE: 16 BRPM | BODY MASS INDEX: 30.43 KG/M2 | WEIGHT: 161.2 LBS | SYSTOLIC BLOOD PRESSURE: 120 MMHG | HEART RATE: 74 BPM | OXYGEN SATURATION: 98 % | HEIGHT: 61 IN | TEMPERATURE: 97.6 F

## 2021-02-11 DIAGNOSIS — R26.2 AMBULATORY DYSFUNCTION: ICD-10-CM

## 2021-02-11 DIAGNOSIS — H35.3290 EXUDATIVE AGE-RELATED MACULAR DEGENERATION, UNSPECIFIED LATERALITY, UNSPECIFIED STAGE (HCC): ICD-10-CM

## 2021-02-11 DIAGNOSIS — S33.5XXS LOW BACK SPRAIN, SEQUELA: ICD-10-CM

## 2021-02-11 DIAGNOSIS — E03.9 HYPOTHYROIDISM, UNSPECIFIED TYPE: ICD-10-CM

## 2021-02-11 DIAGNOSIS — M54.2 CERVICALGIA: Primary | ICD-10-CM

## 2021-02-11 DIAGNOSIS — Z01.818 PREOPERATIVE CLEARANCE: ICD-10-CM

## 2021-02-11 DIAGNOSIS — H25.89 OTHER AGE-RELATED CATARACT, UNSPECIFIED LATERALITY: ICD-10-CM

## 2021-02-11 DIAGNOSIS — M75.102 ROTATOR CUFF SYNDROME OF LEFT SHOULDER: ICD-10-CM

## 2021-02-11 PROCEDURE — 99214 OFFICE O/P EST MOD 30 MIN: CPT | Performed by: FAMILY MEDICINE

## 2021-02-11 RX ORDER — LEVOTHYROXINE SODIUM 112 UG/1
112 TABLET ORAL DAILY
Qty: 90 TABLET | Refills: 1 | Status: SHIPPED | OUTPATIENT
Start: 2021-02-11 | End: 2021-08-04

## 2021-02-12 PROBLEM — H25.89 OTHER AGE-RELATED CATARACT: Status: ACTIVE | Noted: 2021-02-12

## 2021-02-12 PROBLEM — Z01.818 PREOPERATIVE CLEARANCE: Status: ACTIVE | Noted: 2021-02-12

## 2021-02-12 NOTE — PROGRESS NOTES
Assessment/Plan:     49-year-old woman with:  Cervicalgia rotator cuff syndrome preop clearance for upcoming cataract surgery and also macular degeneration along with hypothyroidism and ambulatory dysfunction discussed workup and treatment options  Patient may proceed to the OR with acceptable risk will refer to PT and balance and gait training and will continue current medications discussed supportive care return parameters otherwise    No problem-specific Assessment & Plan notes found for this encounter  Diagnoses and all orders for this visit:    Cervicalgia  -     Ambulatory referral to Physical Therapy; Future    Rotator cuff syndrome of left shoulder  -     Ambulatory referral to Physical Therapy; Future    Exudative age-related macular degeneration, unspecified laterality, unspecified stage (HCC)    Low back sprain, sequela  -     Ambulatory referral to Physical Therapy; Future    Ambulatory dysfunction  -     Ambulatory referral to Physical Therapy; Future    Hypothyroidism, unspecified type  -     levothyroxine 112 mcg tablet; Take 1 tablet (112 mcg total) by mouth daily    Preoperative clearance    Other age-related cataract, unspecified laterality          Subjective:     Chief Complaint   Patient presents with   [facility] Cataract    Bursitis     left shoulder area pain, has history of this        Patient ID: Lashae Strickland is a 66 y o  female       Patient is a 49-year-old woman who presents for follow-up on cervicalgia and rotator cuff syndrome along with low back pain and she admits that her gait is still off no weakness numbness or tingling no fevers chills nausea vomiting she also has hypothyroidism and preop clearance for upcoming cataract surgery no chest pain or shortness of breath no fevers chills nausea vomiting she admits good functional capacity no other complaints at this time      The following portions of the patient's history were reviewed and updated as appropriate: allergies, current medications, past family history, past medical history, past social history, past surgical history and problem list     Review of Systems   Constitutional: Negative  HENT: Negative  Eyes: Negative  Respiratory: Negative  Cardiovascular: Negative  Gastrointestinal: Negative  Endocrine: Negative  Genitourinary: Negative  Musculoskeletal: Negative  Allergic/Immunologic: Negative  Neurological: Negative  Hematological: Negative  Psychiatric/Behavioral: Negative  All other systems reviewed and are negative  Objective:      /70   Pulse 74   Temp 97 6 °F (36 4 °C)   Resp 16   Ht 5' 1" (1 549 m)   Wt 73 1 kg (161 lb 3 2 oz)   SpO2 98%   BMI 30 46 kg/m²          Physical Exam  Constitutional:       Appearance: She is well-developed  HENT:      Head: Atraumatic  Right Ear: External ear normal       Left Ear: External ear normal    Eyes:      Conjunctiva/sclera: Conjunctivae normal       Pupils: Pupils are equal, round, and reactive to light  Neck:      Musculoskeletal: Normal range of motion  Cardiovascular:      Rate and Rhythm: Normal rate and regular rhythm  Heart sounds: Normal heart sounds  Pulmonary:      Effort: Pulmonary effort is normal  No respiratory distress  Breath sounds: Normal breath sounds  Abdominal:      General: There is no distension  Palpations: Abdomen is soft  Tenderness: There is no abdominal tenderness  There is no guarding or rebound  Musculoskeletal: Normal range of motion  Skin:     General: Skin is warm and dry  Neurological:      Mental Status: She is alert and oriented to person, place, and time  Cranial Nerves: No cranial nerve deficit  Psychiatric:         Behavior: Behavior normal          Thought Content:  Thought content normal          Judgment: Judgment normal

## 2021-02-15 ENCOUNTER — APPOINTMENT (OUTPATIENT)
Dept: PHYSICAL THERAPY | Facility: CLINIC | Age: 76
End: 2021-02-15
Payer: MEDICARE

## 2021-03-02 ENCOUNTER — IMMUNIZATIONS (OUTPATIENT)
Dept: FAMILY MEDICINE CLINIC | Facility: HOSPITAL | Age: 76
End: 2021-03-02

## 2021-03-02 DIAGNOSIS — Z23 ENCOUNTER FOR IMMUNIZATION: Primary | ICD-10-CM

## 2021-03-02 PROCEDURE — 91300 SARS-COV-2 / COVID-19 MRNA VACCINE (PFIZER-BIONTECH) 30 MCG: CPT

## 2021-03-02 PROCEDURE — 0001A SARS-COV-2 / COVID-19 MRNA VACCINE (PFIZER-BIONTECH) 30 MCG: CPT

## 2021-03-03 ENCOUNTER — OFFICE VISIT (OUTPATIENT)
Dept: OBGYN CLINIC | Facility: MEDICAL CENTER | Age: 76
End: 2021-03-03
Payer: MEDICARE

## 2021-03-03 VITALS
DIASTOLIC BLOOD PRESSURE: 74 MMHG | BODY MASS INDEX: 29.45 KG/M2 | SYSTOLIC BLOOD PRESSURE: 147 MMHG | WEIGHT: 156 LBS | TEMPERATURE: 98.8 F | HEIGHT: 61 IN | HEART RATE: 92 BPM

## 2021-03-03 DIAGNOSIS — M25.561 ACUTE PAIN OF RIGHT KNEE: Primary | ICD-10-CM

## 2021-03-03 PROCEDURE — 99213 OFFICE O/P EST LOW 20 MIN: CPT | Performed by: STUDENT IN AN ORGANIZED HEALTH CARE EDUCATION/TRAINING PROGRAM

## 2021-03-03 NOTE — PROGRESS NOTES
1  Acute pain of right knee       No orders of the defined types were placed in this encounter  Imaging Studies (I personally reviewed images in PACS and report):    Prior imagin  X-ray right knee 2020:  No acute osseous abnormality    IMPRESSION:  Acute right knee pain - Resolved  Gait dysfunction RLE - Resolved    Intervention: prednisone (from another provider), PT, knee sleeves    PLAN:  Clinical exam prior radiographic imaging reviewed with patient today, with impression as per above  Patient is clinically resolve symptoms with unremarkable exam today of her right knee today  Recommended follow up as needed at this time  Return if symptoms worsen or fail to improve  CHIEF COMPLAINT:  Follow up right knee pain    HPI:  Jocelyn Lunsford is a 66 y o  female  who presents for       Visit 2021 : Follow up evaluation right knee pain: Resolved  Patient reports that she has had no pain of her right knee for the past few weeks  Denies knee clicking/popping, swelling, numbness/tingling  She is able to ambulate w/o issue  Denies new injuries  Not requiring pain medications and is no longer using her knee brace  Visit summary 2021 :  Initial evaluation right knee pain:  Patient noted an injury on 12/10/2020 transitioning from lying down to standing and feeling a pulling pain of the back of her knee  This pain limited her range of motion of her knee and caused her to limp for a few weeks  She had been prescribed prednisone from another physician which had already been improving her pain  Patient was referred to formal PT as she was not satisfied with her progress  Knee sleeve was also prescribed  Review of Systems   Constitutional: Negative for chills, fatigue and fever  Respiratory: Negative for cough and shortness of breath  Gastrointestinal: Negative for abdominal pain, nausea and vomiting  Musculoskeletal:        As per HPI   Skin: Negative for rash and wound  Neurological:        As per HPI         Medical, Surgical, Family, and Social History    Past Medical History:   Diagnosis Date    Depression     Diverticulosis     Glaucoma 2015    medication    Hemorrhoids 2009    Hyperlipidemia     Hypertension     Hypothyroidism      Past Surgical History:   Procedure Laterality Date    CARPAL TUNNEL RELEASE Bilateral     , 2018    HYSTERECTOMY      age 62    OOPHORECTOMY Bilateral age 62     Social History   Social History     Substance and Sexual Activity   Alcohol Use Yes    Frequency: Monthly or less    Drinks per session: 1 or 2    Binge frequency: Never    Comment: Socially     Social History     Substance and Sexual Activity   Drug Use No     Social History     Tobacco Use   Smoking Status Former Smoker    Packs/day: 20 00    Years: 10 00    Pack years: 200 00    Types: Cigarettes    Quit date: 12    Years since quittin 1   Smokeless Tobacco Never Used   Tobacco Comment    no passive smoked exposure      Family History   Problem Relation Age of Onset    Hypertension Mother     Diabetes Father     Brain cancer Sister     No Known Problems Daughter     No Known Problems Maternal Grandmother     No Known Problems Maternal Grandfather     No Known Problems Paternal Grandmother     No Known Problems Paternal Grandfather     No Known Problems Sister     No Known Problems Sister     No Known Problems Sister     No Known Problems Daughter     No Known Problems Daughter     No Known Problems Paternal Aunt     No Known Problems Paternal Aunt     No Known Problems Paternal Aunt     No Known Problems Paternal Aunt      Allergies   Allergen Reactions    Sulfa Antibiotics Rash          Physical Exam  /74   Pulse 92   Temp 98 8 °F (37 1 °C)   Ht 5' 1" (1 549 m)   Wt 70 8 kg (156 lb)   BMI 29 48 kg/m²     Constitutional:  see vital signs  Gen: well-developed, normocephalic/atraumatic, well-groomed  Eyes: No inflammation or discharge of conjunctiva or lids; sclera clear   Pharynx: no inflammation, lesion, or mass of lips  Neck: supple, no masses, non-distended  MSK: no inflammation, lesion, mass, or clubbing of nails and digits except for other than mentioned below  SKIN: no visible rashes or skin lesions  Pulmonary/Chest: Effort normal  No respiratory distress     NEURO: cranial nerves grossly intact  PSYCH:  Alert and oriented to person, place, and time; recent and remote memory intact; mood normal, no depression, anxiety, or agitation, judgment and insight good and intact     Ortho Exam  Right Knee Exam:  Erythema: no  Swelling: no  Increased Warmth: no  Tenderness: none  ROM: 0-130  Patellar Apprehension: negative  Patellar Grind: negative  Lachman's: negative  Anterior Drawer: negative  Posterior Drawer:  Varus laxity: negative  Valgus laxity: negative  [facility]: negative     Gait: normal without antalgia

## 2021-03-27 ENCOUNTER — IMMUNIZATIONS (OUTPATIENT)
Dept: FAMILY MEDICINE CLINIC | Facility: HOSPITAL | Age: 76
End: 2021-03-27

## 2021-03-27 DIAGNOSIS — Z23 ENCOUNTER FOR IMMUNIZATION: Primary | ICD-10-CM

## 2021-03-27 PROCEDURE — 91300 SARS-COV-2 / COVID-19 MRNA VACCINE (PFIZER-BIONTECH) 30 MCG: CPT

## 2021-03-27 PROCEDURE — 0002A SARS-COV-2 / COVID-19 MRNA VACCINE (PFIZER-BIONTECH) 30 MCG: CPT

## 2021-04-01 ENCOUNTER — OFFICE VISIT (OUTPATIENT)
Dept: OBGYN CLINIC | Facility: HOSPITAL | Age: 76
End: 2021-04-01
Payer: MEDICARE

## 2021-04-01 VITALS
DIASTOLIC BLOOD PRESSURE: 85 MMHG | HEART RATE: 95 BPM | WEIGHT: 158.6 LBS | BODY MASS INDEX: 29.94 KG/M2 | HEIGHT: 61 IN | SYSTOLIC BLOOD PRESSURE: 131 MMHG

## 2021-04-01 DIAGNOSIS — M25.462 EFFUSION OF LEFT KNEE: ICD-10-CM

## 2021-04-01 DIAGNOSIS — M17.12 PRIMARY OSTEOARTHRITIS OF LEFT KNEE: ICD-10-CM

## 2021-04-01 DIAGNOSIS — M25.562 ACUTE PAIN OF LEFT KNEE: Primary | ICD-10-CM

## 2021-04-01 PROCEDURE — 99213 OFFICE O/P EST LOW 20 MIN: CPT | Performed by: PHYSICIAN ASSISTANT

## 2021-04-01 PROCEDURE — 20610 DRAIN/INJ JOINT/BURSA W/O US: CPT | Performed by: PHYSICIAN ASSISTANT

## 2021-04-01 RX ORDER — TRIAMCINOLONE ACETONIDE 40 MG/ML
80 INJECTION, SUSPENSION INTRA-ARTICULAR; INTRAMUSCULAR
Status: COMPLETED | OUTPATIENT
Start: 2021-04-01 | End: 2021-04-01

## 2021-04-01 RX ORDER — BUPIVACAINE HYDROCHLORIDE 5 MG/ML
2 INJECTION, SOLUTION EPIDURAL; INTRACAUDAL
Status: COMPLETED | OUTPATIENT
Start: 2021-04-01 | End: 2021-04-01

## 2021-04-01 RX ORDER — LIDOCAINE HYDROCHLORIDE 10 MG/ML
2 INJECTION, SOLUTION INFILTRATION; PERINEURAL
Status: COMPLETED | OUTPATIENT
Start: 2021-04-01 | End: 2021-04-01

## 2021-04-01 RX ADMIN — TRIAMCINOLONE ACETONIDE 80 MG: 40 INJECTION, SUSPENSION INTRA-ARTICULAR; INTRAMUSCULAR at 15:30

## 2021-04-01 RX ADMIN — BUPIVACAINE HYDROCHLORIDE 2 ML: 5 INJECTION, SOLUTION EPIDURAL; INTRACAUDAL at 15:30

## 2021-04-01 RX ADMIN — LIDOCAINE HYDROCHLORIDE 2 ML: 10 INJECTION, SOLUTION INFILTRATION; PERINEURAL at 15:30

## 2021-04-01 NOTE — PROGRESS NOTES
Assessment/Plan   Diagnoses and all orders for this visit:    Acute pain of left knee    Primary osteoarthritis of left knee    Effusion of left knee    - Aspiration and cortisone injection today  - Sleeve as needed  - Ice as needed  - Follow up with Dr Geraldine Thomas in 3 months            Subjective   Patient ID: Romero Celis is a 64 y o  female  Vitals:    04/01/21 1451   BP: 131/85   Pulse: 95     66yo female comes in for an evaluation of her left knee  She has been having generalized knee pain that is a little better today  The timeline was difficult to follow, but I believe she had a fall about 5 weeks ago  She felt fine then  About 1 week ago, she started having knee pain  About 3 days ago, she accidentally kicked a coworker, toe to toe, and had further increased pain  She had Xrays done at [facility] 2 days ago that were negative for fracture  Today, she is feeling better than before  She cannot pinpoint a location for the pain  No associated numbness        The following portions of the patient's history were reviewed and updated as appropriate: allergies, current medications, past family history, past medical history, past social history, past surgical history and problem list     Review of Systems  Ortho Exam  Past Medical History:   Diagnosis Date    Depression     Diverticulosis     Glaucoma 2015    medication    Hemorrhoids 12/2009    Hyperlipidemia     Hypertension     Hypothyroidism      Past Surgical History:   Procedure Laterality Date    CARPAL TUNNEL RELEASE Bilateral     2017, 2018    HYSTERECTOMY      age 62    OOPHORECTOMY Bilateral age 62     Family History   Problem Relation Age of Onset    Hypertension Mother     Diabetes Father     Brain cancer Sister     No Known Problems Daughter     No Known Problems Maternal Grandmother     No Known Problems Maternal Grandfather     No Known Problems Paternal Grandmother     No Known Problems Paternal Grandfather     No Known Problems Sister     No Known Problems Sister     No Known Problems Sister     No Known Problems Daughter     No Known Problems Daughter     No Known Problems Paternal Aunt     No Known Problems Paternal Aunt     No Known Problems Paternal Aunt     No Known Problems Paternal Aunt      Social History     Occupational History    Occupation: Retired   Tobacco Use    Smoking status: Former Smoker     Packs/day: 20 00     Years: 10 00     Pack years: 200 00     Types: Cigarettes     Quit date:      Years since quittin 2    Smokeless tobacco: Never Used    Tobacco comment: no passive smoked exposure    Substance and Sexual Activity    Alcohol use: Yes     Frequency: Monthly or less     Drinks per session: 1 or 2     Binge frequency: Never     Comment: Socially    Drug use: No    Sexual activity: Not Currently       Review of Systems   Constitutional: Negative  HENT: Negative  Eyes: Negative  Respiratory: Negative  Cardiovascular: Negative  Gastrointestinal: Negative  Endocrine: Negative  Genitourinary: Negative  Musculoskeletal: As below      Allergic/Immunologic: Negative  Neurological: Negative  Hematological: Negative  Psychiatric/Behavioral: Negative  Objective   Physical Exam      · Constitutional: Awake, Alert, Oriented  · Eyes: EOMI  · Psych: Mood and affect appropriate  Many of her answers do not match the questions asked  · Heart: regular rate and rhythm  · Lungs: No audible wheezing  · Abdomen: soft  · Lymph: no lymphedema   left Knee:  - Appearance   No swelling, discoloration, deformity, or ecchymosis  - Effusion   moderate  - Palpation   Generalized tenderness about the knee    No one point is any more or less tender than another   - ROM   Extension: 0 and Flexion: 90  - Special Tests   Chris's Test negative, Anterior Drawer Test negative, Posterior Drawer Test negative, Valgus Stress Test negative, Varus Stress Test negative and Patellar apprehension negative  - Motor   normal 5/5 in all planes  - NVI distally    I have personally reviewed pertinent films in PACS and my interpretation is OA noted  No acute displaced fracture        Large joint arthrocentesis: L knee  Universal Protocol:  Consent: Verbal consent obtained  Risks and benefits: risks, benefits and alternatives were discussed  Consent given by: patient  Time out: Immediately prior to procedure a "time out" was called to verify the correct patient, procedure, equipment, support staff and site/side marked as required    Timeout called at: 4/1/2021 3:27 PM   Patient understanding: patient states understanding of the procedure being performed  Site marked: the operative site was marked  Patient identity confirmed: verbally with patient    Supporting Documentation  Indications: pain   Procedure Details  Location: knee - L knee  Needle size: 22 G  Ultrasound guidance: no  Approach: lateral  Medications administered: 2 mL bupivacaine (PF) 0 5 %; 2 mL lidocaine 1 %; 80 mg triamcinolone acetonide 40 mg/mL    Aspirate amount: 30 mL  Aspirate: clear and yellow    Patient tolerance: patient tolerated the procedure well with no immediate complications  Dressing:  Sterile dressing applied

## 2021-04-12 DIAGNOSIS — F32.A DEPRESSION, UNSPECIFIED DEPRESSION TYPE: ICD-10-CM

## 2021-04-13 RX ORDER — VENLAFAXINE HYDROCHLORIDE 75 MG/1
75 CAPSULE, EXTENDED RELEASE ORAL DAILY
Qty: 90 CAPSULE | Refills: 1 | Status: SHIPPED | OUTPATIENT
Start: 2021-04-13 | End: 2021-10-07 | Stop reason: SDUPTHER

## 2021-04-29 DIAGNOSIS — M19.91 PRIMARY OSTEOARTHRITIS, UNSPECIFIED SITE: ICD-10-CM

## 2021-04-29 DIAGNOSIS — I10 HYPERTENSION, UNSPECIFIED TYPE: ICD-10-CM

## 2021-04-29 RX ORDER — LOSARTAN POTASSIUM AND HYDROCHLOROTHIAZIDE 12.5; 5 MG/1; MG/1
1 TABLET ORAL DAILY
Qty: 90 TABLET | Refills: 1 | Status: SHIPPED | OUTPATIENT
Start: 2021-04-29 | End: 2021-11-04 | Stop reason: SDUPTHER

## 2021-04-29 RX ORDER — ATORVASTATIN CALCIUM 10 MG/1
10 TABLET, FILM COATED ORAL DAILY
Qty: 90 TABLET | Refills: 1 | Status: SHIPPED | OUTPATIENT
Start: 2021-04-29 | End: 2021-10-28

## 2021-05-20 DIAGNOSIS — M19.049 OSTEOARTHRITIS OF HAND, UNSPECIFIED LATERALITY, UNSPECIFIED OSTEOARTHRITIS TYPE: Primary | ICD-10-CM

## 2021-05-20 NOTE — TELEPHONE ENCOUNTER
Pt called in complaining of arthritis in her hand  Pt requesting that Dr Sykes refill the medication Prednisone for her  This isn't on pt current medication list but this was prescribed for her on 10/22/20 for arthritis pain by Dr Sykes  Please advise thank you

## 2021-05-27 RX ORDER — PREDNISONE 10 MG/1
10 TABLET ORAL DAILY
OUTPATIENT
Start: 2021-05-27

## 2021-06-17 ENCOUNTER — OFFICE VISIT (OUTPATIENT)
Dept: FAMILY MEDICINE CLINIC | Facility: CLINIC | Age: 76
End: 2021-06-17
Payer: MEDICARE

## 2021-06-17 VITALS
TEMPERATURE: 97 F | HEART RATE: 91 BPM | BODY MASS INDEX: 29.45 KG/M2 | OXYGEN SATURATION: 98 % | DIASTOLIC BLOOD PRESSURE: 80 MMHG | HEIGHT: 61 IN | WEIGHT: 156 LBS | SYSTOLIC BLOOD PRESSURE: 114 MMHG

## 2021-06-17 DIAGNOSIS — M19.049 OSTEOARTHRITIS OF HAND, UNSPECIFIED LATERALITY, UNSPECIFIED OSTEOARTHRITIS TYPE: Primary | ICD-10-CM

## 2021-06-17 DIAGNOSIS — I10 ESSENTIAL HYPERTENSION: ICD-10-CM

## 2021-06-17 DIAGNOSIS — E78.2 MIXED HYPERLIPIDEMIA: ICD-10-CM

## 2021-06-17 DIAGNOSIS — I25.84 CALCIFICATION OF CORONARY ARTERY: ICD-10-CM

## 2021-06-17 DIAGNOSIS — I25.10 CALCIFICATION OF CORONARY ARTERY: ICD-10-CM

## 2021-06-17 DIAGNOSIS — E03.9 HYPOTHYROIDISM, UNSPECIFIED TYPE: ICD-10-CM

## 2021-06-17 PROCEDURE — 99214 OFFICE O/P EST MOD 30 MIN: CPT | Performed by: FAMILY MEDICINE

## 2021-06-17 RX ORDER — KETOROLAC TROMETHAMINE 5 MG/ML
SOLUTION OPHTHALMIC
COMMUNITY
Start: 2021-04-03 | End: 2021-08-06

## 2021-06-17 RX ORDER — PREDNISOLONE ACETATE 10 MG/ML
SUSPENSION/ DROPS OPHTHALMIC
COMMUNITY
Start: 2021-04-03 | End: 2021-08-06

## 2021-06-20 NOTE — PROGRESS NOTES
Assessment/Plan:    68-year-old woman with: osteoarthritis hypothyroidism CAD hypertension hyperlipidemia  Discussed workup and treatment options with risks and benefits will check fasting blood work before follow-up visit in 6 months will refer to hand surgeon discussed supportive care return parameters otherwise continue current medications    No problem-specific Assessment & Plan notes found for this encounter  Diagnoses and all orders for this visit:    Osteoarthritis of hand, unspecified laterality, unspecified osteoarthritis type  -     Ambulatory referral to Hand Surgery; Future  -     CBC and differential; Future  -     Comprehensive metabolic panel; Future  -     TSH, 3rd generation with Free T4 reflex; Future  -     Lipid Panel with Direct LDL reflex; Future    Hypothyroidism, unspecified type  -     CBC and differential; Future  -     Comprehensive metabolic panel; Future  -     TSH, 3rd generation with Free T4 reflex; Future  -     Lipid Panel with Direct LDL reflex; Future    Calcification of coronary artery  -     CBC and differential; Future  -     Comprehensive metabolic panel; Future  -     TSH, 3rd generation with Free T4 reflex; Future  -     Lipid Panel with Direct LDL reflex; Future    Essential hypertension  -     CBC and differential; Future  -     Comprehensive metabolic panel; Future  -     TSH, 3rd generation with Free T4 reflex; Future  -     Lipid Panel with Direct LDL reflex; Future    Mixed hyperlipidemia  -     CBC and differential; Future  -     Comprehensive metabolic panel; Future  -     TSH, 3rd generation with Free T4 reflex; Future  -     Lipid Panel with Direct LDL reflex; Future    Other orders  -     Diclofenac Sodium (VOLTAREN) 1 %; APPLY 4 G TOPICALLY 4 (FOUR) TIMES A DAY FOR 10 DAYS  APPLY TO AFFECTED AREA    -     ketorolac (ACULAR) 0 5 % ophthalmic solution; INSTILL 1 DROP INTO LEFT EYE FOUR TIMES A DAY AFTER SURGERY  -     prednisoLONE acetate (PRED FORTE) 1 % ophthalmic suspension; INSTILL 1 DROP INTO LEFT EYE EVERY 1 2 HOURS AS DIRECTED AFTER SURGERY          Subjective:     Chief Complaint   Patient presents with    Follow-up     4 month follow up, pt plan of care    Hand Pain     stiffness, swelling, started over a year ago, has taken [facility] brand ibuprofen to relieve, subsided a little  pain rate: 4-5 when hand is in active use  Patient ID: [id number] Patten is a 64 y o  female  Patient is a 63 y/o woman who presents for follow-up on osteoarthritis hypothyroidism CAD hypertension hyperlipidemia she admits being stable on medications denies acute complaints no fevers chills nausea vomiting tolerating p o  Intake no other complaints at this time    Hand Pain         The following portions of the patient's history were reviewed and updated as appropriate: allergies, current medications, past family history, past medical history, past social history, past surgical history and problem list     Review of Systems   Constitutional: Negative  HENT: Negative  Eyes: Negative  Respiratory: Negative  Cardiovascular: Negative  Gastrointestinal: Negative  Endocrine: Negative  Genitourinary: Negative  Musculoskeletal: Negative  Allergic/Immunologic: Negative  Neurological: Negative  Hematological: Negative  Psychiatric/Behavioral: Negative  All other systems reviewed and are negative  Objective:      /80   Pulse 91   Temp (!) 97 °F (36 1 °C)   Ht 5' 1" (1 549 m)   Wt 70 8 kg (156 lb)   SpO2 98%   BMI 29 48 kg/m²          Physical Exam  Constitutional:       Appearance: She is well-developed  HENT:      Head: Atraumatic  Right Ear: External ear normal       Left Ear: External ear normal    Eyes:      Conjunctiva/sclera: Conjunctivae normal       Pupils: Pupils are equal, round, and reactive to light  Cardiovascular:      Rate and Rhythm: Normal rate and regular rhythm  Heart sounds: Normal heart sounds  Pulmonary:      Effort: Pulmonary effort is normal  No respiratory distress  Breath sounds: Normal breath sounds  Abdominal:      General: Bowel sounds are normal  There is no distension  Palpations: Abdomen is soft  Tenderness: There is no abdominal tenderness  There is no guarding or rebound  Musculoskeletal:         General: Normal range of motion  Cervical back: Normal range of motion  Skin:     General: Skin is warm and dry  Neurological:      Mental Status: She is alert and oriented to person, place, and time  Cranial Nerves: No cranial nerve deficit  Psychiatric:         Behavior: Behavior normal          Thought Content: Thought content normal          Judgment: Judgment normal          BMI Counseling: Body mass index is 29 48 kg/m²  The BMI is above normal  Nutrition recommendations include encouraging healthy choices of fruits and vegetables  Exercise recommendations include moderate physical activity 150 minutes/week

## 2021-07-12 ENCOUNTER — TELEPHONE (OUTPATIENT)
Dept: OBGYN CLINIC | Facility: MEDICAL CENTER | Age: 76
End: 2021-07-12

## 2021-07-12 NOTE — TELEPHONE ENCOUNTER
Patient calling to see if we received the medical reports? She is checking on the status  If we receive please let her know prior her appointment         # [phone]

## 2021-07-19 ENCOUNTER — TELEPHONE (OUTPATIENT)
Dept: OBGYN CLINIC | Facility: CLINIC | Age: 76
End: 2021-07-19

## 2021-07-22 ENCOUNTER — OFFICE VISIT (OUTPATIENT)
Dept: OBGYN CLINIC | Facility: HOSPITAL | Age: 76
End: 2021-07-22
Payer: MEDICARE

## 2021-07-22 VITALS
DIASTOLIC BLOOD PRESSURE: 83 MMHG | HEART RATE: 81 BPM | SYSTOLIC BLOOD PRESSURE: 144 MMHG | HEIGHT: 61 IN | WEIGHT: 156 LBS | BODY MASS INDEX: 29.45 KG/M2

## 2021-07-22 DIAGNOSIS — M25.512 LEFT SHOULDER PAIN, UNSPECIFIED CHRONICITY: ICD-10-CM

## 2021-07-22 DIAGNOSIS — M17.12 PRIMARY OSTEOARTHRITIS OF LEFT KNEE: Primary | ICD-10-CM

## 2021-07-22 PROCEDURE — 99213 OFFICE O/P EST LOW 20 MIN: CPT | Performed by: ORTHOPAEDIC SURGERY

## 2021-07-22 NOTE — PROGRESS NOTES
Orthopaedics Office Visit - New Patient Visit    ASSESSMENT/PLAN:    Assessment:   Left knee osteoarthritis     Plan:   Patient will begin a formal course of physical therapy with a transition to a home exercise program at this time  Patient was also given a handout of exercises for her left knee to get her started until formal therapy is scheduled  We will see her back on an as needed basis  To Do Next Visit:  Xray of left knee if she returns symptomatic      _____________________________________________________  CHIEF COMPLAINT:  Chief Complaint   Patient presents with    Left Knee - Pain         SUBJECTIVE:  Angela Griffin is a 64 y o  female who presents with left knee pain that started in April 2021  She states that she did have a fall   Which started her knee pain  Patient was seen on 04/01/2021 by our physician assistant in immediate care provided in aspiration and steroid injection to her left knee  Patient states that this significantly helped the pain and swelling to her left knee  She states she is typically pain-free in her left knee that will notice occasional stiffness especially after a day full of activities  She denies any numbness and tingling to her left lower extremity      PAST MEDICAL HISTORY:  Past Medical History:   Diagnosis Date    Depression     Diverticulosis     Glaucoma 2015    medication    Hemorrhoids 12/2009    Hyperlipidemia     Hypertension     Hypothyroidism        PAST SURGICAL HISTORY:  Past Surgical History:   Procedure Laterality Date    CARPAL TUNNEL RELEASE Bilateral     2017, 2018    CATARACT EXTRACTION Left 03/03/2021    HYSTERECTOMY      age 62    OOPHORECTOMY Bilateral age 62       FAMILY HISTORY:  Family History   Problem Relation Age of Onset    Hypertension Mother     Diabetes Father     Brain cancer Sister     No Known Problems Daughter     No Known Problems Maternal Grandmother     No Known Problems Maternal Grandfather     No Known Problems Paternal Grandmother     No Known Problems Paternal Grandfather     No Known Problems Sister     No Known Problems Sister     No Known Problems Sister     No Known Problems Daughter     No Known Problems Daughter     No Known Problems Paternal Aunt     No Known Problems Paternal Aunt     No Known Problems Paternal Aunt     No Known Problems Paternal Aunt        SOCIAL HISTORY:  Social History     Tobacco Use    Smoking status: Former Smoker     Packs/day: 20 00     Years: 10 00     Pack years: 200 00     Types: Cigarettes     Quit date:      Years since quittin 5    Smokeless tobacco: Never Used    Tobacco comment: no passive smoked exposure    Vaping Use    Vaping Use: Never used   Substance Use Topics    Alcohol use: Yes     Comment: Socially    Drug use: No       MEDICATIONS:    Current Outpatient Medications:     acetaminophen (TYLENOL) 650 mg CR tablet, , Disp: , Rfl:     aspirin 81 mg chewable tablet, Chew Daily, Disp: , Rfl:     atorvastatin (LIPITOR) 10 mg tablet, Take 1 tablet (10 mg total) by mouth daily, Disp: 90 tablet, Rfl: 1    bimatoprost (LUMIGAN) 0 01 % ophthalmic drops, Administer into the left eye , Disp: , Rfl:     Carboxymethylcellulose Sodium (Refresh Liquigel) 1 % GEL, as needed, Disp: , Rfl:     diazepam (VALIUM) 2 mg tablet, Take 2 5 tablets (5 mg total) by mouth every 12 (twelve) hours as needed (vertigo) for up to 4 doses, Disp: 4 tablet, Rfl: 0    levothyroxine 112 mcg tablet, Take 1 tablet (112 mcg total) by mouth daily, Disp: 90 tablet, Rfl: 1    LORazepam (ATIVAN) 0 5 mg tablet, Take 1 tablet (0 5 mg total) by mouth 2 (two) times a day as needed for anxiety, Disp: 60 tablet, Rfl: 0    losartan-hydrochlorothiazide (HYZAAR) 50-12 5 mg per tablet, Take 1 tablet by mouth daily, Disp: 90 tablet, Rfl: 1    Lutein 20 MG TABS, 1 tablet by mouth daily, Disp: , Rfl:     meclizine (ANTIVERT) 12 5 MG tablet, Take 1 tablet (12 5 mg total) by mouth 3 (three) times a day as needed for dizziness for up to 10 doses, Disp: 30 tablet, Rfl: 0    Multiple Vitamins-Minerals (PRESERVISION AREDS PO), , Disp: , Rfl:     pravastatin (PRAVACHOL) 40 mg tablet, Take by mouth Daily, Disp: , Rfl:     ranibizumab (Lucentis) 0 3 mg/0 05mL, 0 3 mg by Intravitreal route once , Disp: , Rfl:     venlafaxine (EFFEXOR-XR) 75 mg 24 hr capsule, Take 1 capsule (75 mg total) by mouth daily, Disp: 90 capsule, Rfl: 1    calcium carbonate-vitamin D (OSCAL-D) 500 mg-200 units per tablet, , Disp: , Rfl:     cholecalciferol (VITAMIN D3) 1,000 units tablet, Take 1,000 Units by mouth daily, Disp: , Rfl:     dexamethasone (DECADRON) 4 mg/mL, 1 mL (4 mg total) by Iontophoresis route as needed (to be used by physical therapy for iontophoresis) (Patient not taking: Reported on 2/11/2021), Disp: 10 mL, Rfl: 1    Diclofenac Sodium (VOLTAREN) 1 %, APPLY 4 G TOPICALLY 4 (FOUR) TIMES A DAY FOR 10 DAYS  APPLY TO AFFECTED AREA , Disp: , Rfl:     ketorolac (ACULAR) 0 5 % ophthalmic solution, INSTILL 1 DROP INTO LEFT EYE FOUR TIMES A DAY AFTER SURGERY, Disp: , Rfl:     methocarbamol (ROBAXIN) 500 mg tablet, Take 1 tablet (500 mg total) by mouth daily at bedtime as needed for muscle spasms, Disp: 30 tablet, Rfl: 0    Naproxen Sodium (ALEVE) 220 MG CAPS, i by oral route PRN as needed, Disp: , Rfl:     prednisoLONE acetate (PRED FORTE) 1 % ophthalmic suspension, INSTILL 1 DROP INTO LEFT EYE EVERY 1 2 HOURS AS DIRECTED AFTER SURGERY, Disp: , Rfl:     pyridoxine (B-6) 100 MG tablet, , Disp: , Rfl:     Suprep Bowel Prep Kit 17 5-3 13-1 6 GM/177ML SOLN, Use as directed, Disp: , Rfl:     ALLERGIES:  Allergies   Allergen Reactions    Sulfa Antibiotics Rash       REVIEW OF SYSTEMS:  MSK:   Left knee pain  Neuro: Toes warm and perfuse  Pertinent items are otherwise noted in HPI  A comprehensive review of systems was otherwise negative      LABS:  HgA1c: No results found for: HGBA1C  BMP:   Lab Results Component Value Date    CALCIUM 9 1 01/14/2021    K 3 9 01/14/2021    CO2 31 01/14/2021     01/14/2021    BUN 22 01/14/2021    CREATININE 0 69 01/14/2021     CBC: No components found for: CBC    _____________________________________________________  PHYSICAL EXAMINATION:  Vital signs: /83   Pulse 81   Ht 5' 1" (1 549 m)   Wt 70 8 kg (156 lb)   BMI 29 48 kg/m²   General: No acute distress, awake and alert  Psychiatric: Mood and affect appear appropriate  HEENT: Trachea Midline, No torticollis, no apparent facial trauma  Cardiovascular: No audible murmurs; Extremities appear perfused  Pulmonary: No audible wheezing or stridor  Skin: No open lesions; see further details (if any) below    MUSCULOSKELETAL EXAMINATION:  Extremities:    Left Knee Exam  Alignment:  Normal knee alignment  Inspection:  No swelling  No erythema  Palpation:  medial and lateral joint line tenderness  No effusion  ROM:  Normal knee ROM  Strength:  5/5 hip flexors and abductors  5/5 quadriceps and hamstrings  Stability:  No objective knee instability  Stable Varus / Valgus stress, Lachman, and Posterior drawer  Tests:  No pertinent positive or negative tests  Patella:  Patella tracks centrally with crepitus  Neurovascular:  Sensation intact in DP/SP/Rodriguez/Sa/T nerve distributions  2+ DP & PT pulses  Gait:  Normal       _____________________________________________________  STUDIES REVIEWED:   no images today      PROCEDURES PERFORMED:  Procedures   No procedures today       Fabiola West     Scribe Attestation    I,:  Fabiola West am acting as a scribe while in the presence of the attending physician :       I,:  Colleen Long MD personally performed the services described in this documentation    as scribed in my presence :

## 2021-07-29 NOTE — TELEPHONE ENCOUNTER
I have reached out to patient in reference to medical records that she was looking for  Patient previously went to [place] [address]  for treatment and is being seen with Dr Omid Kirkpatrick next Friday  I have contacted [facility] to have the records faxed to the correct number  Email was sent to the [facility] department to change the fax number  As soon as they are faxed over, I will call the patient back to let her know we have received them

## 2021-08-02 NOTE — TELEPHONE ENCOUNTER
Patient wants to know if we received her records from Dr Collins Black when she saw him at [facility]  She is r/s'd for an appt to see him this Friday and wants to make sure we have them ahead of time      Nithin LEONARD#[phone]

## 2021-08-03 ENCOUNTER — TELEPHONE (OUTPATIENT)
Dept: OBGYN CLINIC | Facility: HOSPITAL | Age: 76
End: 2021-08-03

## 2021-08-03 NOTE — TELEPHONE ENCOUNTER
Patient wants to know if we received her records from Dr Harmeet Chavez when she saw him at [facility]  She is r/s'd for an appt to see him this Friday and wants to make sure we have them ahead of time  I did look in the chart & as of today 08/03 I didn't see them yet       ph [phone]

## 2021-08-03 NOTE — TELEPHONE ENCOUNTER
We have not received her records as of today, 8/3/21  I will reach out to [facility] again to retrieve the status of these records being faxed

## 2021-08-04 NOTE — TELEPHONE ENCOUNTER
Patient called to see if we received her medical records  Patient would like a call back when we get her records      Call back # [phone]

## 2021-08-05 NOTE — TELEPHONE ENCOUNTER
Spoke with someone from [address] [place] [address] on 7/29 to have patients records faxed  Number they provided to me that the records were faxed to was incorrect, filled out a form to have fax number corrected  Called [employer] through [address] [place] [address] back and the request is still in processing as of today, 8/5

## 2021-08-05 NOTE — TELEPHONE ENCOUNTER
Patient is calling to check on receipt of records  I spoke with Angie Altamirano at the  at [place], at the suggestion of [employer] [employer]  Records have not yet been received  [address] [place] [address] has told the patient they have sent the records twice  Patient had surgeries with Dr Harmeet Chavez at [address] [place] [address] 5+ years ago  Patient would like to know if Dr Harmeet Chavez will see her without these records  Patient needs to know within the next two hours, for work  Please advise

## 2021-08-06 ENCOUNTER — APPOINTMENT (OUTPATIENT)
Dept: RADIOLOGY | Facility: CLINIC | Age: 76
End: 2021-08-06
Payer: MEDICARE

## 2021-08-06 ENCOUNTER — OFFICE VISIT (OUTPATIENT)
Dept: OBGYN CLINIC | Facility: CLINIC | Age: 76
End: 2021-08-06
Payer: MEDICARE

## 2021-08-06 VITALS
DIASTOLIC BLOOD PRESSURE: 87 MMHG | BODY MASS INDEX: 29.27 KG/M2 | SYSTOLIC BLOOD PRESSURE: 144 MMHG | HEART RATE: 80 BPM | WEIGHT: 155 LBS | HEIGHT: 61 IN

## 2021-08-06 DIAGNOSIS — M19.041 PRIMARY OSTEOARTHRITIS OF RIGHT HAND: Primary | ICD-10-CM

## 2021-08-06 DIAGNOSIS — M25.531 RIGHT WRIST PAIN: ICD-10-CM

## 2021-08-06 PROCEDURE — 73110 X-RAY EXAM OF WRIST: CPT

## 2021-08-06 PROCEDURE — 99213 OFFICE O/P EST LOW 20 MIN: CPT | Performed by: ORTHOPAEDIC SURGERY

## 2021-08-06 NOTE — PROGRESS NOTES
CHIEF COMPLAINT:  Chief Complaint   Patient presents with    Right Wrist - Pain       SUBJECTIVE:  Casie Buckner is a 64y o  year old  female who presents *to the office for evaluation of her right wrist  Pt states that she has been having right wrist pain and swelling as well as right hand MCP joints  Pt states that she takes Ibuprofen as needed but does not take it often because she feels that it reduces her pain so much that she does too much activity  Pt has hx of RECTR performed by me at [facility], and LOCTR performed by Dr Elida Stephenson about 2 years ago         PAST MEDICAL HISTORY:  Past Medical History:   Diagnosis Date    Depression     Diverticulosis     Glaucoma 2015    medication    Hemorrhoids 2009    Hyperlipidemia     Hypertension     Hypothyroidism        PAST SURGICAL HISTORY:  Past Surgical History:   Procedure Laterality Date    CARPAL TUNNEL RELEASE Bilateral     ,     CATARACT EXTRACTION Left 2021    HYSTERECTOMY      age 62    OOPHORECTOMY Bilateral age 62       FAMILY HISTORY:  Family History   Problem Relation Age of Onset    Hypertension Mother     Diabetes Father     Brain cancer Sister     No Known Problems Daughter     No Known Problems Maternal Grandmother     No Known Problems Maternal Grandfather     No Known Problems Paternal Grandmother     No Known Problems Paternal Grandfather     No Known Problems Sister     No Known Problems Sister     No Known Problems Sister     No Known Problems Daughter     No Known Problems Daughter     No Known Problems Paternal Aunt     No Known Problems Paternal Aunt     No Known Problems Paternal Aunt     No Known Problems Paternal Aunt        SOCIAL HISTORY:  Social History     Tobacco Use    Smoking status: Former Smoker     Packs/day: 20 00     Years: 10 00     Pack years: 200 00     Types: Cigarettes     Quit date:      Years since quittin 6    Smokeless tobacco: Never Used    Tobacco comment: no passive smoked exposure    Vaping Use    Vaping Use: Never used   Substance Use Topics    Alcohol use: Yes     Comment: Socially    Drug use: No       MEDICATIONS:    Current Outpatient Medications:     acetaminophen (TYLENOL) 650 mg CR tablet, , Disp: , Rfl:     aspirin 81 mg chewable tablet, Chew Daily, Disp: , Rfl:     atorvastatin (LIPITOR) 10 mg tablet, Take 1 tablet (10 mg total) by mouth daily, Disp: 90 tablet, Rfl: 1    bimatoprost (LUMIGAN) 0 01 % ophthalmic drops, Administer into the left eye , Disp: , Rfl:     Carboxymethylcellulose Sodium (Refresh Liquigel) 1 % GEL, as needed, Disp: , Rfl:     diazepam (VALIUM) 2 mg tablet, Take 2 5 tablets (5 mg total) by mouth every 12 (twelve) hours as needed (vertigo) for up to 4 doses, Disp: 4 tablet, Rfl: 0    levothyroxine 112 mcg tablet, TAKE 1 TABLET EVERY DAY, Disp: 90 tablet, Rfl: 1    LORazepam (ATIVAN) 0 5 mg tablet, Take 1 tablet (0 5 mg total) by mouth 2 (two) times a day as needed for anxiety, Disp: 60 tablet, Rfl: 0    losartan-hydrochlorothiazide (HYZAAR) 50-12 5 mg per tablet, Take 1 tablet by mouth daily, Disp: 90 tablet, Rfl: 1    Lutein 20 MG TABS, 1 tablet by mouth daily, Disp: , Rfl:     meclizine (ANTIVERT) 12 5 MG tablet, Take 1 tablet (12 5 mg total) by mouth 3 (three) times a day as needed for dizziness for up to 10 doses, Disp: 30 tablet, Rfl: 0    Multiple Vitamins-Minerals (PRESERVISION AREDS PO), , Disp: , Rfl:     pravastatin (PRAVACHOL) 40 mg tablet, Take by mouth Daily, Disp: , Rfl:     ranibizumab (Lucentis) 0 3 mg/0 05mL, 0 3 mg by Intravitreal route once , Disp: , Rfl:     venlafaxine (EFFEXOR-XR) 75 mg 24 hr capsule, Take 1 capsule (75 mg total) by mouth daily, Disp: 90 capsule, Rfl: 1    ALLERGIES:  Allergies   Allergen Reactions    Sulfa Antibiotics Rash       REVIEW OF SYSTEMS:  Review of Systems   Constitutional: Negative for chills, fever and unexpected weight change     HENT: Negative for hearing loss, nosebleeds and sore throat  Eyes: Negative for pain, redness and visual disturbance  Respiratory: Negative for cough, shortness of breath and wheezing  Cardiovascular: Negative for chest pain, palpitations and leg swelling  Gastrointestinal: Negative for abdominal pain, nausea and vomiting  Endocrine: Negative for polydipsia and polyuria  Genitourinary: Negative for dysuria and hematuria  Skin: Negative for rash and wound  Neurological: Negative for dizziness and headaches  Psychiatric/Behavioral: Negative for decreased concentration, dysphoric mood and suicidal ideas  The patient is not nervous/anxious          VITALS:  Vitals:    08/06/21 0836   BP: 144/87   Pulse: 80       LABS:  HgA1c: No results found for: HGBA1C  BMP:   Lab Results   Component Value Date    CALCIUM 9 1 01/14/2021    K 3 9 01/14/2021    CO2 31 01/14/2021     01/14/2021    BUN 22 01/14/2021    CREATININE 0 69 01/14/2021       _____________________________________________________  PHYSICAL EXAMINATION:  General: well developed and well nourished, alert, oriented times 3 and appears comfortable  Psychiatric: Normal  HEENT: Trachea Midline, No torticollis  Pulmonary: No audible wheezing or strider  Cardiovascular: No discernable arrhythmia   Skin: No masses, erythema, lacerations, fluctation, ulcerations  Neurovascular: Sensation Intact to the Median, Ulnar, Radial Nerve, Motor Intact to the Median, Ulnar, Radial Nerve and Pulses Intact    MUSCULOSKELETAL EXAMINATION:  Right UE  Fullness at all of the MCP joints   No pain with motion  Skin intact  No erythema or ecchymosis   No wrist fullness       ___________________________________________________  STUDIES REVIEWED:  xrays of the right wrist performed today show no acute fractures or dislocations       PROCEDURES PERFORMED:  Procedures  No Procedures performed today    _____________________________________________________  ASSESSMENT/PLAN:    Right hand MCP joint OA   - Pt was advised to take OTC nsaid and Tylenol prior to activities that cause pain  - Pt was advied that if her pain returns and is not relieved with OTC medications we can consider a medrol dose pack or CSI depending on the affected joints ar that time     Follow Up:  Return if symptoms worsen or fail to improve          To Do Next Visit:  Re-evaluation of current issue      Scribe Attestation    I,:  Jeanmarie Orozco am acting as a scribe while in the presence of the attending physician :       I,:  Sue Knowles MD personally performed the services described in this documentation    as scribed in my presence :

## 2021-08-19 ENCOUNTER — EVALUATION (OUTPATIENT)
Dept: PHYSICAL THERAPY | Facility: CLINIC | Age: 76
End: 2021-08-19
Payer: MEDICARE

## 2021-08-19 DIAGNOSIS — M17.12 PRIMARY OSTEOARTHRITIS OF LEFT KNEE: Primary | ICD-10-CM

## 2021-08-19 DIAGNOSIS — M25.512 LEFT SHOULDER PAIN, UNSPECIFIED CHRONICITY: ICD-10-CM

## 2021-08-19 PROCEDURE — 97162 PT EVAL MOD COMPLEX 30 MIN: CPT | Performed by: PHYSICAL THERAPIST

## 2021-08-19 PROCEDURE — 97110 THERAPEUTIC EXERCISES: CPT | Performed by: PHYSICAL THERAPIST

## 2021-08-19 NOTE — PROGRESS NOTES
PT Evaluation     Today's date: 2021  Patient name: Angela Griffin  : 1945  MRN: [id number]  Referring provider: Brennan Rubinstein, MD  Dx:   Encounter Diagnosis     ICD-10-CM    1  Primary osteoarthritis of left knee  M17 12 Ambulatory referral to Physical Therapy   2  Left shoulder pain, unspecified chronicity  M25 512 Ambulatory referral to Physical Therapy                  Assessment  Assessment details: Angela Griffin is a pleasant 64 y o  female who presents with signs and symptoms correlating with referring diagnosis  No further referral appears necessary at this time based upon examination results  The patient's greatest concerns are decreasing pain at end range motion and following sitting for long periods of time  She presents with a movement impairment diagnosis of hypomobile knee flexion ROM  This also presents with poor knee extension ROM, decreased balance, and increased pain to palpation around the joint line  She does not present with ROM or shoulder weakness on the LUE compared to the RUE  She was educated on improvement on the internal rotation ROM today, she will progress as tolerated with this, but focus will be on the the L knee  She was taken through a terminal knee mobilization for tibial anterior glide with improvement in ROM and pain post   Negative prognostic indicators: none  Positive prognostic indicators: good motivation  Please contact me if you have any further questions or recommendations  Thank you very much for the kind referral       Impairments: abnormal or restricted ROM, abnormal movement, activity intolerance, impaired balance and pain with function    Symptom irritability: moderateUnderstanding of Dx/Px/POC: good   Prognosis: good    Goals  STGs  1  Decrease pain by 20% in 2-4 weeks  2  Improve knee ROM to terminal without pain in 2-4 weeks  3  Improve knee strength by 1/3 grade in 2-4 weeks  LTGs  1  Decrease pain by 60% in 6-8 weeks    2  Improve tolerance to rising from sitting without pain in 6-8 weeks  3  Perform job activities without pain in 6-8 weeks  Plan  Patient would benefit from: skilled physical therapy  Referral necessary: No  Planned modality interventions: cryotherapy, TENS and thermotherapy: hydrocollator packs  Planned therapy interventions: manual therapy, therapeutic training, stretching, strengthening, therapeutic activities, therapeutic exercise, patient education, activity modification, neuromuscular re-education and home exercise program  Frequency: 2x week  Duration in weeks: 6  Treatment plan discussed with: patient        Subjective Evaluation    History of Present Illness  Mechanism of injury: Pt is a 64 y o  female presenting w/ L sided knee and shoulder pain  The knee pain started about 1 year ago following a fall on her L knee and patella, following this the knee swelled up immediately and required to be drained  Following the aspiration, she felt instant relief  Since then the knee hurts with terminal knee extension, and following rising from sitting for long periods of time  The shoulder is inconsistent and she is unaware if she is having pain on the L or R side  She is currently stating that both shoulders hurt from arthritis, however, the R side she had increased pain since her blood pressure was taken 1 year ago       Neurological signs: none  Red Flags: none   PMH: h/o LBP, cervicalgia, and arthritis in B shoulders and knees           Not a recurrent problem   Quality of life: good    Pain  Current pain ratin  At best pain ratin  Location: anterior joint line of knee and superior/posterior aspect of shoulder   Quality: tight  Relieving factors: medications  Aggravating factors: sitting, standing and stair climbing  Progression: no change    Social Support  Stairs in house: yes (13 stairs )     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prep )  Patient Goals  Patient goals for therapy: increased strength, decreased pain, increased motion and independence with ADLs/IADLs          Objective     Active Range of Motion   Left Shoulder   Flexion: 160 degrees   Abduction: 160 degrees   External rotation 0°: 70 degrees   Internal rotation BTB: T7     Right Shoulder   Flexion: 150 degrees   Abduction: 160 degrees   External rotation 0°: 70 degrees   Internal rotation BTB: T10 with pain  Left Knee   Flexion: 130 degrees with pain  Extension: -2 degrees with pain    Right Knee   Flexion: 130 degrees   Extension: 0 degrees     Additional Active Range of Motion Details  Squat: decreased WBing on LLE compared to R   Palpation: increased pain to joint line   YANELI: patellar mobility WNL     Passive Range of Motion     Additional Passive Range of Motion Details  increased pain at terminal knee flex-ext     Strength/Myotome Testing     Left Shoulder   Normal muscle strength    Right Shoulder   Normal muscle strength    Left Knee   Normal strength    Right Knee   Normal strength    Tests     Left Shoulder   Negative empty can, Hawkin's and passive horizontal adduction  Right Shoulder   Positive Hawkin's  Negative empty can and passive horizontal adduction       Additional Tests Details  Valgus stress test increased pain not positive  SLS balance: RLE 5 seconds and 2 seconds LLE                Precautions: none    Daily Treatment Diary    Date 8/19       Visit Number 1       FOTO IE       Re-Eval IE          Manuals    Knee flex mob GR4                               Neuro Re-Ed     bridge        SLR         gabriel row/ext         Ball on wall         Wall walks                        Ther Ex    Bike         Hamstring stretch  HEP       Quad stretch prone  HEP       LP         UBE         Ladders        BTB stretch  HEP               Ther Activity                                    Modalities

## 2021-08-24 ENCOUNTER — APPOINTMENT (OUTPATIENT)
Dept: PHYSICAL THERAPY | Facility: CLINIC | Age: 76
End: 2021-08-24
Payer: MEDICARE

## 2021-08-26 ENCOUNTER — OFFICE VISIT (OUTPATIENT)
Dept: PHYSICAL THERAPY | Facility: CLINIC | Age: 76
End: 2021-08-26
Payer: MEDICARE

## 2021-08-26 DIAGNOSIS — M17.12 PRIMARY OSTEOARTHRITIS OF LEFT KNEE: Primary | ICD-10-CM

## 2021-08-26 DIAGNOSIS — M25.512 LEFT SHOULDER PAIN, UNSPECIFIED CHRONICITY: ICD-10-CM

## 2021-08-26 PROCEDURE — 97112 NEUROMUSCULAR REEDUCATION: CPT

## 2021-08-26 PROCEDURE — 97110 THERAPEUTIC EXERCISES: CPT

## 2021-08-26 NOTE — PROGRESS NOTES
Daily Note     Today's date: 2021  Patient name: Charmaine Dye  : 1945  MRN: [id number]  Referring provider: Yoselin Parks MD  Dx:   Encounter Diagnosis     ICD-10-CM    1  Primary osteoarthritis of left knee  M17 12    2  Left shoulder pain, unspecified chronicity  M25 512                   Subjective: Patient noted no pain in L knee currently  Patient noted no issues with HEP except some discomfort in L shoulder with BTB stretch  Objective: See treatment diary below      Assessment: Tolerated treatment fair  VC for initiation of exercises  Patient was able to perform listed exercises without complaints  Patient was challenged with keeping correct form with performing ball on wall  Update HEP NV  Patient would benefit from continued PT      Plan: Continue per plan of care        Precautions: none    Daily Treatment Diary    Date       Visit Number 1 2      FOTO IE       Re-Eval IE          Manuals    Knee flex mob GR4 GR4 PT [address]                              Neuro Re-Ed     bridge  5"x10      SLR   3x10      gabriel row/ext   x15 10R rows  8R ext ea x15      Ball on wall   Green MB x20 cw/ccw      Wall walks  Pink TB  x5                      Ther Ex    Bike   5'      Hamstring stretch  HEP 30" x 3       Quad stretch prone  HEP 10"x10      LP   55# 2x10      UBE   3'3'      Ladders  5"x10 fwd      BTB stretch  HEP 10"x10              Ther Activity                                    Modalities

## 2021-08-31 ENCOUNTER — OFFICE VISIT (OUTPATIENT)
Dept: PHYSICAL THERAPY | Facility: CLINIC | Age: 76
End: 2021-08-31
Payer: MEDICARE

## 2021-08-31 DIAGNOSIS — M17.12 PRIMARY OSTEOARTHRITIS OF LEFT KNEE: Primary | ICD-10-CM

## 2021-08-31 DIAGNOSIS — M25.512 LEFT SHOULDER PAIN, UNSPECIFIED CHRONICITY: ICD-10-CM

## 2021-08-31 PROCEDURE — 97110 THERAPEUTIC EXERCISES: CPT | Performed by: PHYSICAL THERAPIST

## 2021-08-31 PROCEDURE — 97112 NEUROMUSCULAR REEDUCATION: CPT | Performed by: PHYSICAL THERAPIST

## 2021-08-31 NOTE — PROGRESS NOTES
Daily Note     Today's date: 2021  Patient name: Ana Cristina Calhoun  : 1945  MRN: [id number]  Referring provider: Franky Hodges MD  Dx:   Encounter Diagnosis     ICD-10-CM    1  Primary osteoarthritis of left knee  M17 12    2  Left shoulder pain, unspecified chronicity  M25 512                   Subjective:   Pt reports she needs to leave by noon for another appointment  Pt reports she feels good post PT session  Objective: See treatment diary below      Assessment: Pt demonstrates difficulty with R shoulder over left however noted in chart the script is for the L shoulder therefore had pt perform TE on both shoulders to tolerance  Pt did require cuing for correct technique of standing hip TE  Patient demonstrated fatigue post treatment, exhibited good technique with therapeutic exercises and would benefit from continued PT to increase flexibility, strength and function  Plan: Continue per plan of care        Precautions: none    Daily Treatment Diary    Date      Visit Number 1 2 3     FOTO IE       Re-Eval IE          Manuals    Knee flex mob GR4 GR4 PT [address]                              Neuro Re-Ed     bridge  5"x10 5"x15     SLR   3x10 3 x 10     gabriel row/ext   x15 10R rows  8R ext ea x15      Ball on wall   Green MB x20 cw/ccw      Wall walks  Pink TB  x5 Pink TB  x5                     Ther Ex    Bike   5' 5'     Hamstring stretch  HEP 30" x 3  30" x 3      Quad stretch prone  HEP 10"x10 20"x5     LP   55# 2x10      UBE   3'3' 3'/3'     Ladders  5"x10 fwd 5"x10 fwd B     BTB stretch  HEP 10"x10              Ther Activity                                    Modalities

## 2021-09-02 ENCOUNTER — APPOINTMENT (OUTPATIENT)
Dept: PHYSICAL THERAPY | Facility: CLINIC | Age: 76
End: 2021-09-02
Payer: MEDICARE

## 2021-09-09 ENCOUNTER — OFFICE VISIT (OUTPATIENT)
Dept: PHYSICAL THERAPY | Facility: CLINIC | Age: 76
End: 2021-09-09
Payer: MEDICARE

## 2021-09-09 DIAGNOSIS — M17.12 PRIMARY OSTEOARTHRITIS OF LEFT KNEE: Primary | ICD-10-CM

## 2021-09-09 DIAGNOSIS — M25.512 LEFT SHOULDER PAIN, UNSPECIFIED CHRONICITY: ICD-10-CM

## 2021-09-09 PROCEDURE — 97110 THERAPEUTIC EXERCISES: CPT

## 2021-09-09 PROCEDURE — 97112 NEUROMUSCULAR REEDUCATION: CPT

## 2021-09-09 NOTE — PROGRESS NOTES
Daily Note     Today's date: 2021  Patient name: Jett Burch  : 1945  MRN: [id number]  Referring provider: Quincy Reynoso MD  Dx:   Encounter Diagnosis     ICD-10-CM    1  Primary osteoarthritis of left knee  M17 12    2  Left shoulder pain, unspecified chronicity  M25 512        Start Time: 0900  Stop Time: 0947  Total time in clinic (min): 47 minutes    Subjective: Pt presents to PT reporting less pain in L shoulder and knee but states R shoulder is most painful  Pt advised to contact MD for script for R shoulder  Pt reports feeling better post PT session  Objective: See treatment diary below      Assessment: Pt demonstrates good tolerance to session today with only complaints of pain in R UE  Patient demonstrated fatigue post treatment, exhibited good technique with therapeutic exercises and would benefit from continued PT to increase flexibility, strength and function  Plan: Continue per plan of care        Precautions: none    Daily Treatment Diary    Date     Visit Number 1 2 3 4    FOTO IE       Re-Eval IE          Manuals    Knee flex mob GR4 GR4  [address]  np                            Neuro Re-Ed     bridge  5"x10 5"x15     SLR   3x10 3 x 10     gabriel row/ext   x15 10R rows  8R ext ea x15  11R rows,  9R ext ea x20    Ball on wall   Green MB x20 cw/ccw  Green MB x30 cw/ccw    Wall walks  Pink TB  x5 Pink TB  x5 Pink TB  x5                    Ther Ex    Bike   5' 5' 5'    Hamstring stretch  HEP 30" x 3  30" x 3  30" x 3     Quad stretch prone  HEP 10"x10 20"x5     LP   55# 2x10  65# 3x10    UBE   3'3' 3'/3' 3'/3'    Ladders  5"x10 fwd 5"x10 fwd B 5"x10 fwd B    BTB stretch  HEP 10"x10              Ther Activity                                    Modalities

## 2021-09-13 ENCOUNTER — OFFICE VISIT (OUTPATIENT)
Dept: PHYSICAL THERAPY | Facility: CLINIC | Age: 76
End: 2021-09-13
Payer: MEDICARE

## 2021-09-13 DIAGNOSIS — M25.512 LEFT SHOULDER PAIN, UNSPECIFIED CHRONICITY: ICD-10-CM

## 2021-09-13 DIAGNOSIS — M17.12 PRIMARY OSTEOARTHRITIS OF LEFT KNEE: Primary | ICD-10-CM

## 2021-09-13 PROCEDURE — 97110 THERAPEUTIC EXERCISES: CPT

## 2021-09-13 PROCEDURE — 97112 NEUROMUSCULAR REEDUCATION: CPT

## 2021-09-13 NOTE — PROGRESS NOTES
Daily Note     Today's date: 2021  Patient name: Cheyanne Lassiter  : 1945  MRN: [id number]  Referring provider: Magdalene Reyes MD  Dx:   Encounter Diagnosis     ICD-10-CM    1  Primary osteoarthritis of left knee  M17 12    2  Left shoulder pain, unspecified chronicity  M25 512                   Subjective: Pt reports "just getting off of work" and feels "pretty good" reporting pain a /10  Pt states we should have received a new script for R shoulder  Pt denies increased pain post PT session  Objective: See treatment diary below      Assessment: Tolerated treatment well  Pt requires frequent cuing for correct technique with most TE today  Pt continues to work toward increasing strength in L LE and shoulder  Patient demonstrated fatigue post treatment and would benefit from continued PT to increase flexibility, strength and function  Plan: Continue per plan of care        Precautions: none    Daily Treatment Diary    Date    Visit Number 1 2 3 4 5   FOTO IE    55/63   Re-Eval IE          Manuals    Knee flex mob GR4 GR4  [address]  np np                           Neuro Re-Ed     bridge  5"x10 5"x15  3" x 20   SLR   3x10 3 x 10  3x 10   gabriel row/ext   x15 10R rows  8R ext ea x15  11R rows,  9R ext ea x20 12R rows,  10R ext ea x20   Ball on wall   Green MB x20 cw/ccw  Green MB x30 cw/ccw Green MB x30 cw/ccw   Wall walks  Pink TB  x5 Pink TB  x5 Pink TB  x5 Pink TB  x5                   Ther Ex    Bike   5' 5' 5' 5'   Hamstring stretch  HEP 30" x 3  30" x 3  30" x 3  30" x 3    Quad stretch prone  HEP 10"x10 20"x5  20"x5   LP   55# 2x10  65# 3x10 75# 3x10   UBE   3'3' 3'/3' 3'/3'    Ladders  5"x10 fwd 5"x10 fwd B 5"x10 fwd B    BTB stretch  HEP 10"x10              Ther Activity                                    Modalities

## 2021-09-16 ENCOUNTER — OFFICE VISIT (OUTPATIENT)
Dept: PHYSICAL THERAPY | Facility: CLINIC | Age: 76
End: 2021-09-16
Payer: MEDICARE

## 2021-09-16 ENCOUNTER — OFFICE VISIT (OUTPATIENT)
Dept: FAMILY MEDICINE CLINIC | Facility: CLINIC | Age: 76
End: 2021-09-16
Payer: MEDICARE

## 2021-09-16 VITALS
BODY MASS INDEX: 29.79 KG/M2 | HEIGHT: 61 IN | DIASTOLIC BLOOD PRESSURE: 70 MMHG | SYSTOLIC BLOOD PRESSURE: 138 MMHG | TEMPERATURE: 96.6 F | WEIGHT: 157.8 LBS

## 2021-09-16 DIAGNOSIS — M75.81 RIGHT ROTATOR CUFF TENDINITIS: Primary | ICD-10-CM

## 2021-09-16 DIAGNOSIS — M25.512 LEFT SHOULDER PAIN, UNSPECIFIED CHRONICITY: ICD-10-CM

## 2021-09-16 DIAGNOSIS — M17.12 PRIMARY OSTEOARTHRITIS OF LEFT KNEE: Primary | ICD-10-CM

## 2021-09-16 PROBLEM — M75.102 ROTATOR CUFF SYNDROME OF LEFT SHOULDER: Status: RESOLVED | Noted: 2021-02-11 | Resolved: 2021-09-16

## 2021-09-16 PROCEDURE — 97110 THERAPEUTIC EXERCISES: CPT

## 2021-09-16 PROCEDURE — 99213 OFFICE O/P EST LOW 20 MIN: CPT | Performed by: FAMILY MEDICINE

## 2021-09-16 PROCEDURE — 97112 NEUROMUSCULAR REEDUCATION: CPT

## 2021-09-16 NOTE — PROGRESS NOTES
Daily Note     Today's date: 2021  Patient name: Ross Esteban  : 1945  MRN: [id number]  Referring provider: Mikhail Smyth MD  Dx:   Encounter Diagnosis     ICD-10-CM    1  Primary osteoarthritis of left knee  M17 12    2  Left shoulder pain, unspecified chronicity  M25 512                   Subjective: Pt presents to PT reporting "stiffness" only in L knee  Pt reports "little bit of pain" in anterior L shoulder  Pt states she has appointment with PCP at 3pm for R shoulder  Pt denies pain post PT session  Objective: See treatment diary below      Assessment: Tolerated treatment well  Added SL LP for L LE strength  Patient demonstrated fatigue post treatment, exhibited good technique with therapeutic exercises and would benefit from continued PT to increase flexibility, strength and function  Plan: Continue per plan of care        Precautions: none    Daily Treatment Diary    Date    Visit Number 6  3 4 5   FOTO     55/63   Re-Eval           Manuals    Knee flex mob    np np                           Neuro Re-Ed     bridge 20x  5"x15  3" x 20   SLR  2# 2 x 10 ea  3 x 10  3x 10   gabriel row/ext  12R rows,  11R ext ea 3x10   11R rows,  9R ext ea x20 12R rows,  10R ext ea x20   Ball on wall  Grn MB 1 alpha   Green MB x30 cw/ccw Green MB x30 cw/ccw   Wall walks 5" x 10 pink TB  Pink TB  x5 Pink TB  x5 Pink TB  x5                   Ther Ex    Bike  TM 5'  5' 5' 5'   Hamstring stretch  20"x5  30" x 3  30" x 3  30" x 3    Quad stretch prone  20"x5  20"x5  20"x5   LP  85#  B, SL 39# 3 x 10 ea   65# 3x10 75# 3x10   UBE  3'/3'  3'/3' 3'/3'    Ladders np  5"x10 fwd B 5"x10 fwd B    BTB stretch  10" x 5               Ther Activity                                    Modalities

## 2021-09-20 ENCOUNTER — OFFICE VISIT (OUTPATIENT)
Dept: PHYSICAL THERAPY | Facility: CLINIC | Age: 76
End: 2021-09-20
Payer: MEDICARE

## 2021-09-20 DIAGNOSIS — M25.512 LEFT SHOULDER PAIN, UNSPECIFIED CHRONICITY: ICD-10-CM

## 2021-09-20 DIAGNOSIS — M17.12 PRIMARY OSTEOARTHRITIS OF LEFT KNEE: Primary | ICD-10-CM

## 2021-09-20 PROCEDURE — 97112 NEUROMUSCULAR REEDUCATION: CPT

## 2021-09-20 PROCEDURE — 97110 THERAPEUTIC EXERCISES: CPT

## 2021-09-20 NOTE — PROGRESS NOTES
Daily Note     Today's date: 2021  Patient name: Elwood Libman  : 1945  MRN: [id number]  Referring provider: Saritha Sims MD  Dx:   Encounter Diagnosis     ICD-10-CM    1  Primary osteoarthritis of left knee  M17 12    2  Left shoulder pain, unspecified chronicity  M25 512                   Subjective: Pt presents to PT reports improved ROM and pain in L shoulder  Pt reports L knee is doing real good; stiff in the morning but no pain  Pt did reports increased B LE soreness secondary to leg press  Pt denies increased pain post PT session  Objective: See treatment diary below      Assessment: Tolerated treatment well  Decreased weight for LP with positive response  Pt required cuing for quad lag while SLR, L LE  Patient demonstrated fatigue post treatment, exhibited good technique with therapeutic exercises and would benefit from continued PT to increase flexibility, strength and function  Plan: Continue per plan of care        Precautions: none    Daily Treatment Diary    Date    Visit Number 6 7  4 5   FOTO     55/63   Re-Eval           Manuals    Knee flex mob    np np                           Neuro Re-Ed     bridge 20x 20x   3" x 20   SLR  2# 2 x 10 ea 2# 3 x 10 ea   3x 10   gabriel row/ext  12R rows,  11R ext ea 3x10 13R rows,  12R ext ea 2x10  11R rows,  9R ext ea x20 12R rows,  10R ext ea x20   Ball on wall  Grn MB 1 alpha Grn MB 1x alpha  Green MB x30 cw/ccw Green MB x30 cw/ccw   Wall walks 5" x 10 pink TB 5" x 10 GRN TB  Pink TB  x5 Pink TB  x5                   Ther Ex    Bike  TM 5' TM 5'  5' 5'   Hamstring stretch  20"x5 20"x5  30" x 3  30" x 3    Quad stretch prone  20"x5 20"x5   20"x5   LP  85#  B, SL 39# 3 x 10 ea 75#  B, SL 35# 2 x 10 ea  65# 3x10 75# 3x10   UBE  3'/3' nv  3'/3'    Ladders np np  5"x10 fwd B    BTB stretch  10" x 5 20" x5              Ther Activity                                    Modalities

## 2021-09-20 NOTE — PROGRESS NOTES
Assessment/Plan:    68-year-old woman with:  Right rotator cuff tendinitis discussed heat and cold stretching anti-inflammatories will refer to PT advised to call back if not improving worsening    No problem-specific Assessment & Plan notes found for this encounter  Diagnoses and all orders for this visit:    Right rotator cuff tendinitis  -     Ambulatory referral to Physical Therapy; Future          Subjective:     Chief Complaint   Patient presents with    Shoulder Pain     Right shoulder and forearm pain  Woulde like a referral for PT  Patient ID: Lizbeth Fraser is a 64 y o  female  Patient is a 68-year-old woman presents for follow-up on right rotator cuff tendinitis she admits she did well with therapy for the left shoulder but she would like a new refer no fevers chills nausea vomit      The following portions of the patient's history were reviewed and updated as appropriate: allergies, current medications, past family history, past medical history, past social history, past surgical history and problem list     Review of Systems   Constitutional: Negative  HENT: Negative  Eyes: Negative  Respiratory: Negative  Cardiovascular: Negative  Gastrointestinal: Negative  Endocrine: Negative  Genitourinary: Negative  Musculoskeletal: Positive for arthralgias and myalgias  Allergic/Immunologic: Negative  Neurological: Negative  Hematological: Negative  Psychiatric/Behavioral: Negative  All other systems reviewed and are negative  Objective:      /70 (BP Location: Left arm, Patient Position: Sitting, Cuff Size: Standard)   Temp (!) 96 6 °F (35 9 °C) (Tympanic)   Ht 5' 1" (1 549 m)   Wt 71 6 kg (157 lb 12 8 oz)   BMI 29 82 kg/m²          Physical Exam  Constitutional:       Appearance: She is well-developed  HENT:      Head: Atraumatic        Right Ear: External ear normal       Left Ear: External ear normal    Eyes:      Conjunctiva/sclera: Conjunctivae normal       Pupils: Pupils are equal, round, and reactive to light  Cardiovascular:      Rate and Rhythm: Normal rate and regular rhythm  Heart sounds: Normal heart sounds  Pulmonary:      Effort: Pulmonary effort is normal  No respiratory distress  Breath sounds: Normal breath sounds  Abdominal:      General: Bowel sounds are normal  There is no distension  Palpations: Abdomen is soft  Tenderness: There is no abdominal tenderness  There is no guarding or rebound  Musculoskeletal:         General: Normal range of motion  Cervical back: Normal range of motion  Skin:     General: Skin is warm and dry  Neurological:      Mental Status: She is alert and oriented to person, place, and time  Cranial Nerves: No cranial nerve deficit  Psychiatric:         Behavior: Behavior normal          Thought Content: Thought content normal          Judgment: Judgment normal          BMI Counseling: Body mass index is 29 82 kg/m²  The BMI is above normal  Nutrition recommendations include encouraging healthy choices of fruits and vegetables  Exercise recommendations include moderate physical activity 150 minutes/week  Rationale for BMI follow-up plan is due to patient being overweight or obese

## 2021-09-23 ENCOUNTER — EVALUATION (OUTPATIENT)
Dept: PHYSICAL THERAPY | Facility: CLINIC | Age: 76
End: 2021-09-23
Payer: MEDICARE

## 2021-09-23 DIAGNOSIS — M25.512 LEFT SHOULDER PAIN, UNSPECIFIED CHRONICITY: ICD-10-CM

## 2021-09-23 DIAGNOSIS — M25.511 CHRONIC RIGHT SHOULDER PAIN: ICD-10-CM

## 2021-09-23 DIAGNOSIS — M17.12 PRIMARY OSTEOARTHRITIS OF LEFT KNEE: Primary | ICD-10-CM

## 2021-09-23 DIAGNOSIS — G89.29 CHRONIC RIGHT SHOULDER PAIN: ICD-10-CM

## 2021-09-23 PROCEDURE — 97110 THERAPEUTIC EXERCISES: CPT | Performed by: PHYSICAL THERAPIST

## 2021-09-23 PROCEDURE — 97164 PT RE-EVAL EST PLAN CARE: CPT | Performed by: PHYSICAL THERAPIST

## 2021-09-23 PROCEDURE — 97140 MANUAL THERAPY 1/> REGIONS: CPT | Performed by: PHYSICAL THERAPIST

## 2021-09-23 NOTE — PROGRESS NOTES
PT Re-Evaluation     Today's date: 2021  Patient name: Anabelle Steele  : 1945  MRN: [id number]  Referring provider: Alyssa Limon MD  Dx:   Encounter Diagnosis     ICD-10-CM    1  Primary osteoarthritis of left knee  M17 12    2  Left shoulder pain, unspecified chronicity  M25 512    3  Chronic right shoulder pain  M25 511     G89 29        Start Time: 1002  Stop Time: 1047  Total time in clinic (min): 45 minutes    Assessment  Assessment details: Anabelle Steele has demonstrated overall improvement in ROM, strength, function, and a reduction in pain for her knee and L shoulder  Currently, she has made steady progress towards her goals, but continues to remain limited with her tolerance to strength and ROM testing to the L shoulder into elevation  She is presenting today with signs of impingement syndrome on the RUE, with pain during A/PROM, positive navarrete, increased pain with cross body test, and improvement post YANELI into post/inf glide  She will continue to work on her shoulders here in therapy but will discharge her knee to continue to progress at home  At this time, skilled physical therapy is warranted to address the remaining impairments and aide in return to PLOF  Limiting factors to therapy none and she demonstrates good motivation  Impairments: abnormal or restricted ROM, abnormal movement, activity intolerance, impaired balance and pain with function    Symptom irritability: moderateUnderstanding of Dx/Px/POC: good   Prognosis: good    Goals  STGs  1  Decrease pain by 20% in 2-4 weeks  2  Improve knee ROM to terminal without pain in 2-4 weeks  MET  3  Improve knee strength by 1/3 grade in 2-4 weeks  MET      LTGs  1  Decrease pain by 60% in 6-8 weeks  2  Improve tolerance to rising from sitting without pain in 6-8 weeks  3  Perform job activities without pain in 6-8 weeks          Plan  Patient would benefit from: skilled physical therapy  Referral necessary: No  Planned modality interventions: cryotherapy, TENS and thermotherapy: hydrocollator packs  Planned therapy interventions: manual therapy, therapeutic training, stretching, strengthening, therapeutic activities, therapeutic exercise, patient education, activity modification, neuromuscular re-education and home exercise program  Frequency: 2x week  Duration in weeks: 6  Treatment plan discussed with: patient        Subjective Evaluation    History of Present Illness  Mechanism of injury: Pt states that the     Neurological signs: none  Red Flags: none   PMH: h/o LBP, cervicalgia, and arthritis in B shoulders and knees           Not a recurrent problem   Quality of life: good    Pain  Current pain rating: 3 (R shoulder)  At best pain ratin  Location: superior aspect and anterior bicep   Quality: sharp  Relieving factors: medications  Aggravating factors: sitting, standing and stair climbing  Progression: no change (better in the knee and L shoulder)    Social Support  Stairs in house: yes (13 stairs )     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[employer] )  Patient Goals  Patient goals for therapy: increased strength, decreased pain, increased motion and independence with ADLs/IADLs          Objective     Active Range of Motion   Left Shoulder   Flexion: 160 degrees   Abduction: 160 degrees   External rotation 0°: 70 degrees   Internal rotation BTB: T7     Right Shoulder   Flexion: 145 degrees   Abduction: 140 degrees   External rotation 0°: 70 degrees   Internal rotation BTB: T10 with pain  Left Knee   Flexion: 130 degrees   Extension: -2 degrees     Right Knee   Flexion: 130 degrees   Extension: 0 degrees     Additional Active Range of Motion Details  Squat: Good and full   Palpation: no pain  YANELI: patellar mobility WNL     Passive Range of Motion   Left Shoulder   Flexion: WFL  Abduction: WFL  External rotation 0°: WFL    Right Shoulder   Flexion: 150 degrees with pain  Abduction: 160 degrees with pain  Adduction: [facility]    Strength/Myotome Testing     Left Shoulder   Normal muscle strength    Planes of Motion   Abduction: 4-     Right Shoulder   Normal muscle strength    Planes of Motion   Flexion: 3+   Abduction: 3+     Left Knee   Normal strength    Right Knee   Normal strength    Tests     Left Shoulder   Negative empty can, Hawkin's and passive horizontal adduction  Right Shoulder   Positive Hawkin's  Negative empty can and passive horizontal adduction       Additional Tests Details  No pain with valgus loading today and improved SLS to 5 seconds               Precautions: none    Daily Treatment Diary    Date 9/16 9/20 9/23 9/13   Visit Number 6 7 8  5   FOTO     55/63   Re-Eval           Manuals    PA/inf glide   [address]                             Neuro Re-Ed                     gabriel row/ext  12R rows,  11R ext ea 3x10 13R rows,  12R ext ea 2x10 13R rows,  12R ext ea 3x10  12R rows,  10R ext ea x20   Ball on wall  Grn MB 1 alpha Grn MB 1x alpha Grn 1x B  Green MB x30 cw/ccw   Wall walks 5" x 10 pink TB 5" x 10 GRN TB   Pink TB  x5   scap slides   15x             Ther Ex    Bike  TM 5' TM 5' 5' DC NV  5'   No monies    NV     Body blade   NV     Push up plus   NV     UBE  3'/3' nv NV     Ladders np np Abd/flex 10x5" B     BTB stretch  10" x 5 20" x5      Sleeper stretch   NV     Ther Activity    Flex/abd   10x2  1#                              Modalities

## 2021-09-27 ENCOUNTER — APPOINTMENT (OUTPATIENT)
Dept: PHYSICAL THERAPY | Facility: CLINIC | Age: 76
End: 2021-09-27
Payer: MEDICARE

## 2021-09-30 ENCOUNTER — OFFICE VISIT (OUTPATIENT)
Dept: PHYSICAL THERAPY | Facility: CLINIC | Age: 76
End: 2021-09-30
Payer: MEDICARE

## 2021-09-30 DIAGNOSIS — G89.29 CHRONIC RIGHT SHOULDER PAIN: ICD-10-CM

## 2021-09-30 DIAGNOSIS — M25.511 CHRONIC RIGHT SHOULDER PAIN: ICD-10-CM

## 2021-09-30 DIAGNOSIS — M25.512 LEFT SHOULDER PAIN, UNSPECIFIED CHRONICITY: Primary | ICD-10-CM

## 2021-09-30 PROCEDURE — 97112 NEUROMUSCULAR REEDUCATION: CPT

## 2021-09-30 PROCEDURE — 97110 THERAPEUTIC EXERCISES: CPT

## 2021-09-30 PROCEDURE — 97530 THERAPEUTIC ACTIVITIES: CPT

## 2021-09-30 NOTE — PROGRESS NOTES
Daily Note     Today's date: 2021  Patient name: Lanre Mobley  : 1945  MRN: [id number]  Referring provider: Erna Wong MD  Dx:   Encounter Diagnosis     ICD-10-CM    1  Left shoulder pain, unspecified chronicity  M25 512    2  Chronic right shoulder pain  M25 511     G89 29                   Subjective: Pt reports pain comes and goes grading the pain at it's worst a 4-5/10  She states she continues to have difficulty lifting trays above shoulder level stating she only attempts lifting the light trays  Pt reports increased pain and flexability  Objective: See treatment diary below      Assessment: Attempted to increased weight with Zheng TE however pt unable to tolerate  Pt required frequent cuing for correct technique today  Patient demonstrated fatigue post treatment, exhibited good technique with therapeutic exercises and would benefit from continued PT to increase flexibility, strength and function  Pt reports medical difficulties with 5 family members in one week  Plan: Continue per plan of care        Precautions: none    Daily Treatment Diary    Date     Visit Number 6 7 [phone] 9    FOTO        Re-Eval           Manuals    PA/inf glide    [address]                             Neuro Re-Ed                     zheng row/ext  12R rows,  11R ext ea 3x10 13R rows,  12R ext ea 2x10 13R rows,  12R ext ea 3x10 13R rows,  12R ext ea 3x10    Ball on wall  Grn MB 1 alpha Grn MB 1x alpha Grn 1x B Grn 1x B    Wall walks 5" x 10 pink TB 5" x 10 GRN TB  5" x 10 GRN TB    scap slides   15x 15x            Ther Ex    Bike  TM 5' TM 5' 5' DC NV -- --   No monies    NV OTB  10" x 10    Body blade   NV nv    Push up plus   NV 10    UBE  3'/3' nv NV 3'/3'    Ladders np np Abd/flex 10x5" B Abd/flex 10x5" B    BTB stretch  10" x 5 20" x5  20" x 5    Sleeper stretch   NV 10" x 5    Ther Activity    Flex/abd   10x2  1#  10x2  1#                             Modalities

## 2021-10-04 ENCOUNTER — APPOINTMENT (OUTPATIENT)
Dept: PHYSICAL THERAPY | Facility: CLINIC | Age: 76
End: 2021-10-04
Payer: MEDICARE

## 2021-10-07 DIAGNOSIS — F32.A DEPRESSION, UNSPECIFIED DEPRESSION TYPE: ICD-10-CM

## 2021-10-07 RX ORDER — VENLAFAXINE HYDROCHLORIDE 75 MG/1
CAPSULE, EXTENDED RELEASE ORAL
Qty: 90 CAPSULE | Refills: 1 | Status: SHIPPED | OUTPATIENT
Start: 2021-10-07 | End: 2022-05-12

## 2021-10-07 RX ORDER — VENLAFAXINE HYDROCHLORIDE 75 MG/1
75 CAPSULE, EXTENDED RELEASE ORAL DAILY
Qty: 90 CAPSULE | Refills: 1 | Status: SHIPPED | OUTPATIENT
Start: 2021-10-07 | End: 2021-10-07

## 2021-10-11 ENCOUNTER — OFFICE VISIT (OUTPATIENT)
Dept: PHYSICAL THERAPY | Facility: CLINIC | Age: 76
End: 2021-10-11
Payer: MEDICARE

## 2021-10-11 DIAGNOSIS — M25.512 LEFT SHOULDER PAIN, UNSPECIFIED CHRONICITY: Primary | ICD-10-CM

## 2021-10-11 DIAGNOSIS — G89.29 CHRONIC RIGHT SHOULDER PAIN: ICD-10-CM

## 2021-10-11 DIAGNOSIS — M25.511 CHRONIC RIGHT SHOULDER PAIN: ICD-10-CM

## 2021-10-11 PROCEDURE — 97110 THERAPEUTIC EXERCISES: CPT | Performed by: PHYSICAL THERAPIST

## 2021-10-14 ENCOUNTER — APPOINTMENT (OUTPATIENT)
Dept: PHYSICAL THERAPY | Facility: CLINIC | Age: 76
End: 2021-10-14
Payer: MEDICARE

## 2021-10-18 ENCOUNTER — APPOINTMENT (OUTPATIENT)
Dept: PHYSICAL THERAPY | Facility: CLINIC | Age: 76
End: 2021-10-18
Payer: MEDICARE

## 2021-10-21 ENCOUNTER — APPOINTMENT (OUTPATIENT)
Dept: PHYSICAL THERAPY | Facility: CLINIC | Age: 76
End: 2021-10-21
Payer: MEDICARE

## 2021-10-23 ENCOUNTER — IMMUNIZATIONS (OUTPATIENT)
Dept: FAMILY MEDICINE CLINIC | Facility: HOSPITAL | Age: 76
End: 2021-10-23

## 2021-10-23 DIAGNOSIS — Z23 ENCOUNTER FOR IMMUNIZATION: Primary | ICD-10-CM

## 2021-10-23 PROCEDURE — 91300 COVID-19 PFIZER VACC 0.3 ML: CPT

## 2021-10-23 PROCEDURE — 0001A COVID-19 PFIZER VACC 0.3 ML: CPT

## 2021-10-28 ENCOUNTER — APPOINTMENT (OUTPATIENT)
Dept: PHYSICAL THERAPY | Facility: CLINIC | Age: 76
End: 2021-10-28
Payer: MEDICARE

## 2021-10-28 DIAGNOSIS — M19.91 PRIMARY OSTEOARTHRITIS, UNSPECIFIED SITE: ICD-10-CM

## 2021-10-28 RX ORDER — ATORVASTATIN CALCIUM 10 MG/1
TABLET, FILM COATED ORAL
Qty: 90 TABLET | Refills: 1 | Status: SHIPPED | OUTPATIENT
Start: 2021-10-28 | End: 2022-05-12

## 2021-11-04 DIAGNOSIS — I10 HYPERTENSION, UNSPECIFIED TYPE: ICD-10-CM

## 2021-11-04 RX ORDER — LOSARTAN POTASSIUM AND HYDROCHLOROTHIAZIDE 12.5; 5 MG/1; MG/1
1 TABLET ORAL DAILY
Qty: 90 TABLET | Refills: 1 | Status: SHIPPED | OUTPATIENT
Start: 2021-11-04 | End: 2022-05-06

## 2021-11-05 ENCOUNTER — HOSPITAL ENCOUNTER (OUTPATIENT)
Dept: MAMMOGRAPHY | Facility: CLINIC | Age: 76
Discharge: HOME | End: 2021-11-05

## 2021-11-05 DIAGNOSIS — Z12.31 SCREENING MAMMOGRAM FOR HIGH-RISK PATIENT: ICD-10-CM

## 2021-12-07 ENCOUNTER — TELEPHONE (OUTPATIENT)
Dept: FAMILY MEDICINE CLINIC | Facility: CLINIC | Age: 76
End: 2021-12-07

## 2021-12-11 ENCOUNTER — APPOINTMENT (OUTPATIENT)
Dept: LAB | Facility: HOSPITAL | Age: 76
End: 2021-12-11
Payer: MEDICARE

## 2021-12-11 DIAGNOSIS — M19.049 OSTEOARTHRITIS OF HAND, UNSPECIFIED LATERALITY, UNSPECIFIED OSTEOARTHRITIS TYPE: ICD-10-CM

## 2021-12-11 DIAGNOSIS — E78.2 MIXED HYPERLIPIDEMIA: ICD-10-CM

## 2021-12-11 DIAGNOSIS — I10 ESSENTIAL HYPERTENSION: ICD-10-CM

## 2021-12-11 DIAGNOSIS — E03.9 HYPOTHYROIDISM, UNSPECIFIED TYPE: ICD-10-CM

## 2021-12-11 DIAGNOSIS — I25.10 CALCIFICATION OF CORONARY ARTERY: ICD-10-CM

## 2021-12-11 DIAGNOSIS — I25.84 CALCIFICATION OF CORONARY ARTERY: ICD-10-CM

## 2021-12-11 LAB
ALBUMIN SERPL BCP-MCNC: 3.4 G/DL (ref 3.5–5)
ALP SERPL-CCNC: 48 U/L (ref 46–116)
ALT SERPL W P-5'-P-CCNC: 27 U/L (ref 12–78)
ANION GAP SERPL CALCULATED.3IONS-SCNC: 8 MMOL/L (ref 4–13)
AST SERPL W P-5'-P-CCNC: 18 U/L (ref 5–45)
BASOPHILS # BLD AUTO: 0.04 THOUSANDS/ΜL (ref 0–0.1)
BASOPHILS NFR BLD AUTO: 1 % (ref 0–1)
BILIRUB SERPL-MCNC: 0.85 MG/DL (ref 0.2–1)
BUN SERPL-MCNC: 22 MG/DL (ref 5–25)
CALCIUM ALBUM COR SERPL-MCNC: 9.1 MG/DL (ref 8.3–10.1)
CALCIUM SERPL-MCNC: 8.6 MG/DL (ref 8.3–10.1)
CHLORIDE SERPL-SCNC: 106 MMOL/L (ref 100–108)
CHOLEST SERPL-MCNC: 177 MG/DL
CO2 SERPL-SCNC: 28 MMOL/L (ref 21–32)
CREAT SERPL-MCNC: 0.65 MG/DL (ref 0.6–1.3)
EOSINOPHIL # BLD AUTO: 0.13 THOUSAND/ΜL (ref 0–0.61)
EOSINOPHIL NFR BLD AUTO: 3 % (ref 0–6)
ERYTHROCYTE [DISTWIDTH] IN BLOOD BY AUTOMATED COUNT: 12.6 % (ref 11.6–15.1)
GFR SERPL CREATININE-BSD FRML MDRD: 85 ML/MIN/1.73SQ M
GLUCOSE P FAST SERPL-MCNC: 82 MG/DL (ref 65–99)
HCT VFR BLD AUTO: 43.3 % (ref 34.8–46.1)
HDLC SERPL-MCNC: 71 MG/DL
HGB BLD-MCNC: 14.1 G/DL (ref 11.5–15.4)
IMM GRANULOCYTES # BLD AUTO: 0.01 THOUSAND/UL (ref 0–0.2)
IMM GRANULOCYTES NFR BLD AUTO: 0 % (ref 0–2)
LDLC SERPL CALC-MCNC: 95 MG/DL (ref 0–100)
LYMPHOCYTES # BLD AUTO: 1.57 THOUSANDS/ΜL (ref 0.6–4.47)
LYMPHOCYTES NFR BLD AUTO: 35 % (ref 14–44)
MCH RBC QN AUTO: 32.3 PG (ref 26.8–34.3)
MCHC RBC AUTO-ENTMCNC: 32.6 G/DL (ref 31.4–37.4)
MCV RBC AUTO: 99 FL (ref 82–98)
MONOCYTES # BLD AUTO: 0.56 THOUSAND/ΜL (ref 0.17–1.22)
MONOCYTES NFR BLD AUTO: 13 % (ref 4–12)
NEUTROPHILS # BLD AUTO: 2.14 THOUSANDS/ΜL (ref 1.85–7.62)
NEUTS SEG NFR BLD AUTO: 48 % (ref 43–75)
NRBC BLD AUTO-RTO: 0 /100 WBCS
PLATELET # BLD AUTO: 250 THOUSANDS/UL (ref 149–390)
PMV BLD AUTO: 11.2 FL (ref 8.9–12.7)
POTASSIUM SERPL-SCNC: 3.9 MMOL/L (ref 3.5–5.3)
PROT SERPL-MCNC: 6.7 G/DL (ref 6.4–8.2)
RBC # BLD AUTO: 4.37 MILLION/UL (ref 3.81–5.12)
SODIUM SERPL-SCNC: 142 MMOL/L (ref 136–145)
TRIGL SERPL-MCNC: 53 MG/DL
TSH SERPL DL<=0.05 MIU/L-ACNC: 1.13 UIU/ML (ref 0.36–3.74)
WBC # BLD AUTO: 4.45 THOUSAND/UL (ref 4.31–10.16)

## 2021-12-11 PROCEDURE — 85025 COMPLETE CBC W/AUTO DIFF WBC: CPT

## 2021-12-11 PROCEDURE — 36415 COLL VENOUS BLD VENIPUNCTURE: CPT

## 2021-12-11 PROCEDURE — 80061 LIPID PANEL: CPT

## 2021-12-11 PROCEDURE — 84443 ASSAY THYROID STIM HORMONE: CPT

## 2021-12-11 PROCEDURE — 80053 COMPREHEN METABOLIC PANEL: CPT

## 2021-12-16 ENCOUNTER — HOSPITAL ENCOUNTER (OUTPATIENT)
Dept: RADIOLOGY | Facility: HOSPITAL | Age: 76
Discharge: HOME | End: 2021-12-16
Payer: MEDICARE

## 2021-12-16 ENCOUNTER — OFFICE VISIT (OUTPATIENT)
Dept: FAMILY MEDICINE CLINIC | Facility: CLINIC | Age: 76
End: 2021-12-16
Payer: MEDICARE

## 2021-12-16 VITALS
TEMPERATURE: 96.6 F | OXYGEN SATURATION: 98 % | WEIGHT: 149 LBS | BODY MASS INDEX: 28.13 KG/M2 | HEART RATE: 83 BPM | DIASTOLIC BLOOD PRESSURE: 70 MMHG | SYSTOLIC BLOOD PRESSURE: 122 MMHG | HEIGHT: 61 IN

## 2021-12-16 DIAGNOSIS — E28.310 SYMPTOMATIC PREMATURE MENOPAUSE: ICD-10-CM

## 2021-12-16 DIAGNOSIS — M79.642 LEFT HAND PAIN: Primary | ICD-10-CM

## 2021-12-16 DIAGNOSIS — Z00.00 MEDICARE ANNUAL WELLNESS VISIT, SUBSEQUENT: ICD-10-CM

## 2021-12-16 DIAGNOSIS — Z13.820 OSTEOPOROSIS SCREENING: ICD-10-CM

## 2021-12-16 DIAGNOSIS — M79.642 LEFT HAND PAIN: ICD-10-CM

## 2021-12-16 PROCEDURE — G0439 PPPS, SUBSEQ VISIT: HCPCS | Performed by: FAMILY MEDICINE

## 2021-12-16 PROCEDURE — 99213 OFFICE O/P EST LOW 20 MIN: CPT | Performed by: FAMILY MEDICINE

## 2021-12-16 PROCEDURE — 73130 X-RAY EXAM OF HAND: CPT

## 2021-12-16 PROCEDURE — 1123F ACP DISCUSS/DSCN MKR DOCD: CPT | Performed by: FAMILY MEDICINE

## 2021-12-17 ENCOUNTER — TELEPHONE (OUTPATIENT)
Dept: FAMILY MEDICINE CLINIC | Facility: CLINIC | Age: 76
End: 2021-12-17

## 2021-12-20 ENCOUNTER — OFFICE VISIT (OUTPATIENT)
Dept: OBGYN CLINIC | Facility: CLINIC | Age: 76
End: 2021-12-20
Payer: MEDICARE

## 2021-12-20 ENCOUNTER — TELEPHONE (OUTPATIENT)
Dept: OBGYN CLINIC | Facility: HOSPITAL | Age: 76
End: 2021-12-20

## 2021-12-20 VITALS
HEIGHT: 61 IN | SYSTOLIC BLOOD PRESSURE: 140 MMHG | WEIGHT: 153 LBS | DIASTOLIC BLOOD PRESSURE: 80 MMHG | BODY MASS INDEX: 28.89 KG/M2

## 2021-12-20 DIAGNOSIS — M79.642 LEFT HAND PAIN: Primary | ICD-10-CM

## 2021-12-20 DIAGNOSIS — M65.9 FLEXOR TENOSYNOVITIS OF FINGER: ICD-10-CM

## 2021-12-20 DIAGNOSIS — M19.049 OSTEOARTHRITIS OF HAND, UNSPECIFIED LATERALITY, UNSPECIFIED OSTEOARTHRITIS TYPE: ICD-10-CM

## 2021-12-20 PROBLEM — Z00.00 MEDICARE ANNUAL WELLNESS VISIT, SUBSEQUENT: Status: ACTIVE | Noted: 2021-12-20

## 2021-12-20 PROCEDURE — 99214 OFFICE O/P EST MOD 30 MIN: CPT | Performed by: PHYSICIAN ASSISTANT

## 2021-12-20 RX ORDER — METHYLPREDNISOLONE 4 MG/1
TABLET ORAL
Qty: 21 TABLET | Refills: 0 | Status: SHIPPED | OUTPATIENT
Start: 2021-12-20 | End: 2021-12-21 | Stop reason: SDUPTHER

## 2021-12-21 DIAGNOSIS — M65.9 FLEXOR TENOSYNOVITIS OF FINGER: ICD-10-CM

## 2021-12-21 RX ORDER — METHYLPREDNISOLONE 4 MG/1
TABLET ORAL
Qty: 21 TABLET | Refills: 0 | Status: SHIPPED | OUTPATIENT
Start: 2021-12-21

## 2022-01-06 DIAGNOSIS — E03.9 HYPOTHYROIDISM, UNSPECIFIED TYPE: ICD-10-CM

## 2022-01-06 RX ORDER — LEVOTHYROXINE SODIUM 112 UG/1
TABLET ORAL
Qty: 90 TABLET | Refills: 1 | Status: SHIPPED | OUTPATIENT
Start: 2022-01-06 | End: 2022-07-20

## 2022-01-15 ENCOUNTER — HOSPITAL ENCOUNTER (OUTPATIENT)
Dept: MAMMOGRAPHY | Facility: CLINIC | Age: 77
Discharge: HOME | End: 2022-01-15
Payer: MEDICARE

## 2022-01-15 PROCEDURE — 77067 SCR MAMMO BI INCL CAD: CPT

## 2022-01-15 PROCEDURE — 77063 BREAST TOMOSYNTHESIS BI: CPT

## 2022-01-17 ENCOUNTER — OFFICE VISIT (OUTPATIENT)
Dept: OBGYN CLINIC | Facility: CLINIC | Age: 77
End: 2022-01-17
Payer: MEDICARE

## 2022-01-17 VITALS
DIASTOLIC BLOOD PRESSURE: 80 MMHG | WEIGHT: 152 LBS | SYSTOLIC BLOOD PRESSURE: 138 MMHG | BODY MASS INDEX: 28.7 KG/M2 | HEIGHT: 61 IN

## 2022-01-17 DIAGNOSIS — M65.342 TRIGGER FINGER, LEFT RING FINGER: Primary | ICD-10-CM

## 2022-01-17 DIAGNOSIS — M65.9 FLEXOR TENOSYNOVITIS OF FINGER: ICD-10-CM

## 2022-01-17 DIAGNOSIS — M79.642 LEFT HAND PAIN: ICD-10-CM

## 2022-01-17 PROCEDURE — 20550 NJX 1 TENDON SHEATH/LIGAMENT: CPT | Performed by: PHYSICIAN ASSISTANT

## 2022-01-17 PROCEDURE — 99213 OFFICE O/P EST LOW 20 MIN: CPT | Performed by: PHYSICIAN ASSISTANT

## 2022-01-17 RX ORDER — METHYLPREDNISOLONE ACETATE 40 MG/ML
0.5 INJECTION, SUSPENSION INTRA-ARTICULAR; INTRALESIONAL; INTRAMUSCULAR; SOFT TISSUE
Status: COMPLETED | OUTPATIENT
Start: 2022-01-17 | End: 2022-01-17

## 2022-01-17 RX ORDER — LIDOCAINE HYDROCHLORIDE 5 MG/ML
0.5 INJECTION, SOLUTION INFILTRATION; PERINEURAL
Status: COMPLETED | OUTPATIENT
Start: 2022-01-17 | End: 2022-01-17

## 2022-01-17 RX ADMIN — LIDOCAINE HYDROCHLORIDE 0.5 ML: 5 INJECTION, SOLUTION INFILTRATION; PERINEURAL at 16:09

## 2022-01-17 RX ADMIN — METHYLPREDNISOLONE ACETATE 0.5 ML: 40 INJECTION, SUSPENSION INTRA-ARTICULAR; INTRALESIONAL; INTRAMUSCULAR; SOFT TISSUE at 16:09

## 2022-01-17 NOTE — PROGRESS NOTES
Orthopaedic Surgery - Office Note  Jessica Nieves (43 y o  female)   : 1945   MRN: [id number]  Encounter Date: 2022    Chief Complaint   Patient presents with    Left Hand - Follow-up         Assessment/Plan  Diagnoses and all orders for this visit:    Trigger finger, left ring finger  -     Hand/upper extremity injection: L ring A1    Left hand pain    Flexor tenosynovitis of finger  Comments:  Third and 4th digit    Treatment options were reviewed with the patient in the office today best available option would be to try an injection at the left finger ring A1 pulley  Patient received the injection and a few minutes later had near complete resolution of all symptoms of pain in the hand  Patient will return if symptoms return or do not fully resolve  She may be added on to my schedule at any time as appointments are available     Return if symptoms worsen or fail to improve  History of Present Illness  This is a previous patient here for recheck of left hand pain  She was seen by myself on 2021 with left 3rd and 4th tenosynovitis pain  She had multiple areas of discomfort without any specific locations that would benefit from a cortisone injection  She was started on a oral steroid and returns for visit today  No specific trauma is reported since last visit  Her symptoms did start after peeling a potato  She denies any fever chills nausea vomiting or skin changes  Patient reports that oral medications significantly helped her global pain in the hand but she is left with a painful A1 pulley region of the left ring finger  She reports it is not locking regularly but does have pain in that region  Review of Systems  Pertinent items are noted in HPI  All other systems were reviewed and are negative  Physical Exam  /80   Ht 5' 1" (1 549 m)   Wt 68 9 kg (152 lb)   BMI 28 72 kg/m²   Cons: Appears well  No apparent distress  Psych: Alert  Oriented x3    Mood and affect normal   Eyes: PERRLA, EOMI  Resp: Normal effort  No audible wheezing or stridor  CV: Palpable pulse  No discernable arrhythmia  Lymph:  No palpable cervical, axillary, or inguinal lymphadenopathy  Skin: Warm  No palpable masses  No visible lesions  Neuro: Normal muscle tone  Normal and symmetric DTR's  Patient's left ring finger at the A1 pulley does have a palpable painful nodule with limited triggering appreciated  Neurovascularly she is intact  The swelling pain and discomfort that was noted the last visit has significantly improved  There is no signs of infections or tenosynovitis at this time        Studies Reviewed  Study Result    Narrative & Impression   LEFT HAND     INDICATION:   M79 642: Pain in left hand      COMPARISON:  None     VIEWS:  XR HAND 3+ VW LEFT   Images: 4     For the purposes of institution wide universal language the following terms will apply: (thumb=1st digit/finger, index finger=2nd digit/finger, long finger=3rd digit/finger, ring=4th digit/finger and small finger=5th digit/finger)     FINDINGS:     There is no acute fracture or dislocation      Multifocal degenerative arthritis, most pronounced at the first carpometacarpal joint and the fifth DIP joint     No lytic or blastic osseous lesion      Soft tissues are unremarkable      IMPRESSION:  Multifocal degenerative arthritis     No acute osseous abnormality            Workstation performed: [id number]          Hand/upper extremity injection: L ring A1  Universal Protocol:  Consent: Verbal consent obtained  Risks and benefits: risks, benefits and alternatives were discussed  Consent given by: patient  Time out: Immediately prior to procedure a "time out" was called to verify the correct patient, procedure, equipment, support staff and site/side marked as required    Patient understanding: patient states understanding of the procedure being performed  Patient consent: the patient's understanding of the procedure matches consent given  Procedure consent: procedure consent matches procedure scheduled  Relevant documents: relevant documents present and verified  Test results: test results available and properly labeled  Site marked: the operative site was marked  Radiology Images displayed and confirmed  If images not available, report reviewed: imaging studies available  Patient identity confirmed: verbally with patient    Supporting Documentation  Indications: pain and tendon swelling   Procedure Details  Condition:trigger finger Location: ring finger - L ring A1   Preparation: Patient was prepped and draped in the usual sterile fashion  Needle size: 22 G  Ultrasound guidance: no  Approach: volar  Medications administered: 0 5 mL lidocaine 0 5 %; 0 5 mL methylPREDNISolone acetate 40 mg/mL    Patient tolerance: patient tolerated the procedure well with no immediate complications  Dressing:  Sterile dressing applied         Medical, Surgical, Family, and Social History  The patient's medical history, family history, and social history, were reviewed and updated as appropriate      Past Medical History:   Diagnosis Date    Depression     Diverticulosis     Glaucoma 2015    medication    Hemorrhoids 12/2009    Hyperlipidemia     Hypertension     Hypothyroidism        Past Surgical History:   Procedure Laterality Date    CARPAL TUNNEL RELEASE Bilateral     2017, 2018    CATARACT EXTRACTION Left 03/03/2021    HYSTERECTOMY      age 62    OOPHORECTOMY Bilateral age 62       Family History   Problem Relation Age of Onset    Hypertension Mother     Diabetes Father     Brain cancer Sister     No Known Problems Daughter     No Known Problems Maternal Grandmother     No Known Problems Maternal Grandfather     No Known Problems Paternal Grandmother     No Known Problems Paternal Grandfather     No Known Problems Sister     No Known Problems Sister     No Known Problems Sister     No Known Problems Daughter     No Known Problems Daughter     No Known Problems Paternal Aunt     No Known Problems Paternal Aunt     No Known Problems Paternal Aunt     No Known Problems Paternal Aunt        Social History     Occupational History    Occupation: Retired   Tobacco Use    Smoking status: Former Smoker     Packs/day: 20 00     Years: 10 00     Pack years: 200 00     Types: Cigarettes     Quit date:      Years since quittin 0    Smokeless tobacco: Never Used    Tobacco comment: no passive smoked exposure    Vaping Use    Vaping Use: Never used   Substance and Sexual Activity    Alcohol use: Yes     Comment: Socially    Drug use: No    Sexual activity: Not Currently       Allergies   Allergen Reactions    Sulfa Antibiotics Rash         Current Outpatient Medications:     acetaminophen (TYLENOL) 650 mg CR tablet, , Disp: , Rfl:     aspirin 81 mg chewable tablet, Chew Daily, Disp: , Rfl:     atorvastatin (LIPITOR) 10 mg tablet, TAKE 1 TABLET DAILY, Disp: 90 tablet, Rfl: 1    bimatoprost (LUMIGAN) 0 01 % ophthalmic drops, Administer into the left eye , Disp: , Rfl:     Carboxymethylcellulose Sodium (Refresh Liquigel) 1 % GEL, as needed, Disp: , Rfl:     diazepam (VALIUM) 2 mg tablet, Take 2 5 tablets (5 mg total) by mouth every 12 (twelve) hours as needed (vertigo) for up to 4 doses, Disp: 4 tablet, Rfl: 0    Diclofenac Sodium (VOLTAREN) 1 %, Apply 2 g topically 4 (four) times a day, Disp: 350 g, Rfl: 1    levothyroxine 112 mcg tablet, TAKE 1 TABLET EVERY DAY, Disp: 90 tablet, Rfl: 1    LORazepam (ATIVAN) 0 5 mg tablet, Take 1 tablet (0 5 mg total) by mouth 2 (two) times a day as needed for anxiety, Disp: 60 tablet, Rfl: 0    losartan-hydrochlorothiazide (HYZAAR) 50-12 5 mg per tablet, Take 1 tablet by mouth daily, Disp: 90 tablet, Rfl: 1    Lutein 20 MG TABS, 1 tablet by mouth daily, Disp: , Rfl:     meclizine (ANTIVERT) 12 5 MG tablet, Take 1 tablet (12 5 mg total) by mouth 3 (three) times a day as needed for dizziness for up to 10 doses, Disp: 30 tablet, Rfl: 0    methylPREDNISolone 4 MG tablet therapy pack, Use as directed on package, Disp: 21 tablet, Rfl: 0    Multiple Vitamins-Minerals (PRESERVISION AREDS PO), , Disp: , Rfl:     pravastatin (PRAVACHOL) 40 mg tablet, Take by mouth Daily, Disp: , Rfl:     ranibizumab (Lucentis) 0 3 mg/0 05mL, 0 3 mg by Intravitreal route once , Disp: , Rfl:     venlafaxine (EFFEXOR-XR) 75 mg 24 hr capsule, TAKE 1 CAPSULE EVERY DAY, Disp: 90 capsule, Rfl: 1      Kyrie Perez PA-C

## 2022-05-05 DIAGNOSIS — I10 HYPERTENSION, UNSPECIFIED TYPE: ICD-10-CM

## 2022-05-06 RX ORDER — LOSARTAN POTASSIUM AND HYDROCHLOROTHIAZIDE 12.5; 5 MG/1; MG/1
TABLET ORAL
Qty: 90 TABLET | Refills: 1 | Status: SHIPPED | OUTPATIENT
Start: 2022-05-06

## 2022-05-12 DIAGNOSIS — F32.A DEPRESSION, UNSPECIFIED DEPRESSION TYPE: ICD-10-CM

## 2022-05-12 DIAGNOSIS — M19.91 PRIMARY OSTEOARTHRITIS, UNSPECIFIED SITE: ICD-10-CM

## 2022-05-12 RX ORDER — ATORVASTATIN CALCIUM 10 MG/1
TABLET, FILM COATED ORAL
Qty: 90 TABLET | Refills: 1 | Status: SHIPPED | OUTPATIENT
Start: 2022-05-12

## 2022-05-12 RX ORDER — VENLAFAXINE HYDROCHLORIDE 75 MG/1
CAPSULE, EXTENDED RELEASE ORAL
Qty: 90 CAPSULE | Refills: 1 | Status: SHIPPED | OUTPATIENT
Start: 2022-05-12

## 2022-06-10 ENCOUNTER — RA CDI HCC (OUTPATIENT)
Dept: OTHER | Facility: HOSPITAL | Age: 77
End: 2022-06-10

## 2022-06-10 NOTE — PROGRESS NOTES
[address]  coding opportunities       Chart reviewed, no opportunity found: CHART REVIEWED, NO OPPORTUNITY FOUND        Patients Insurance     Medicare Insurance: Medicare

## 2022-06-23 ENCOUNTER — OFFICE VISIT (OUTPATIENT)
Dept: FAMILY MEDICINE CLINIC | Facility: CLINIC | Age: 77
End: 2022-06-23

## 2022-06-23 VITALS
DIASTOLIC BLOOD PRESSURE: 90 MMHG | BODY MASS INDEX: 28.51 KG/M2 | WEIGHT: 151 LBS | TEMPERATURE: 96 F | SYSTOLIC BLOOD PRESSURE: 140 MMHG | HEART RATE: 78 BPM | OXYGEN SATURATION: 98 % | HEIGHT: 61 IN

## 2022-06-23 DIAGNOSIS — M25.512 CHRONIC PAIN OF BOTH SHOULDERS: Primary | ICD-10-CM

## 2022-06-23 DIAGNOSIS — H35.3290 EXUDATIVE AGE-RELATED MACULAR DEGENERATION, UNSPECIFIED LATERALITY, UNSPECIFIED STAGE (HCC): ICD-10-CM

## 2022-06-23 DIAGNOSIS — M25.511 CHRONIC PAIN OF BOTH SHOULDERS: Primary | ICD-10-CM

## 2022-06-23 DIAGNOSIS — G89.29 CHRONIC PAIN OF BOTH SHOULDERS: Primary | ICD-10-CM

## 2022-06-23 PROCEDURE — 99213 OFFICE O/P EST LOW 20 MIN: CPT | Performed by: FAMILY MEDICINE

## 2022-06-27 ENCOUNTER — HOSPITAL ENCOUNTER (OUTPATIENT)
Dept: RADIOLOGY | Facility: HOSPITAL | Age: 77
Discharge: HOME | End: 2022-06-27
Payer: MEDICARE

## 2022-06-27 DIAGNOSIS — M25.511 CHRONIC PAIN OF BOTH SHOULDERS: ICD-10-CM

## 2022-06-27 DIAGNOSIS — M25.512 CHRONIC PAIN OF BOTH SHOULDERS: ICD-10-CM

## 2022-06-27 DIAGNOSIS — G89.29 CHRONIC PAIN OF BOTH SHOULDERS: ICD-10-CM

## 2022-06-27 PROCEDURE — 73030 X-RAY EXAM OF SHOULDER: CPT

## 2022-06-27 NOTE — PROGRESS NOTES
Assessment/Plan:    75-year-old woman with:  Bilateral shoulder pain along with macular degeneration will check x-rays patient declines referral for PT at this time discussed supportive care return parameters will continue medications otherwise advised patient back if not improving worsening follow-up in 6 month    No problem-specific Assessment & Plan notes found for this encounter  Diagnoses and all orders for this visit:    Chronic pain of both shoulders  -     XR shoulder 2+ vw left; Future  -     XR shoulder 2+ vw right; Future    Exudative age-related macular degeneration, unspecified laterality, unspecified stage (HCC)          Subjective:     Chief Complaint   Patient presents with    Follow-up     6 MONTH        Patient ID: Alla Patel is a 76 y o  female  Patient is a 75-year-old woman who presents for follow-up on bilateral shoulder pain along with macular degeneration she admits being stable medications otherwise except for her shoulder pain denies acute complaints no fevers chills nausea vomiting tolerating p o  intake no complaints at this time      The following portions of the patient's history were reviewed and updated as appropriate: allergies, current medications, past family history, past medical history, past social history, past surgical history and problem list     Review of Systems   Constitutional: Negative  HENT: Negative  Eyes: Negative  Respiratory: Negative  Cardiovascular: Negative  Gastrointestinal: Negative  Endocrine: Negative  Genitourinary: Negative  Musculoskeletal: Positive for arthralgias and myalgias  Allergic/Immunologic: Negative  Neurological: Negative  Hematological: Negative  Psychiatric/Behavioral: Negative  All other systems reviewed and are negative          Objective:      /90 (BP Location: Left arm, Patient Position: Sitting, Cuff Size: Standard)   Pulse 78   Temp (!) 96 °F (35 6 °C) (Tympanic)   Ht 5' 1" (1 549 m)   Wt 68 5 kg (151 lb)   SpO2 98%   BMI 28 53 kg/m²          Physical Exam  Constitutional:       Appearance: She is well-developed  HENT:      Head: Atraumatic  Right Ear: External ear normal       Left Ear: External ear normal    Eyes:      Conjunctiva/sclera: Conjunctivae normal       Pupils: Pupils are equal, round, and reactive to light  Cardiovascular:      Rate and Rhythm: Normal rate and regular rhythm  Heart sounds: Normal heart sounds  Pulmonary:      Effort: Pulmonary effort is normal  No respiratory distress  Breath sounds: Normal breath sounds  Abdominal:      General: There is no distension  Palpations: Abdomen is soft  Tenderness: There is no abdominal tenderness  There is no guarding or rebound  Musculoskeletal:         General: Normal range of motion  Cervical back: Normal range of motion  Skin:     General: Skin is warm and dry  Neurological:      Mental Status: She is alert and oriented to person, place, and time  Cranial Nerves: No cranial nerve deficit  Psychiatric:         Behavior: Behavior normal          Thought Content: Thought content normal          Judgment: Judgment normal          BMI Counseling: Body mass index is 28 53 kg/m²  The BMI is above normal  Nutrition recommendations include encouraging healthy choices of fruits and vegetables  Exercise recommendations include moderate physical activity 150 minutes/week  Rationale for BMI follow-up plan is due to patient being overweight or obese

## 2022-07-07 ENCOUNTER — TELEPHONE (OUTPATIENT)
Dept: FAMILY MEDICINE CLINIC | Facility: CLINIC | Age: 77
End: 2022-07-07

## 2022-07-07 ENCOUNTER — OFFICE VISIT (OUTPATIENT)
Dept: FAMILY MEDICINE CLINIC | Facility: CLINIC | Age: 77
End: 2022-07-07

## 2022-07-07 VITALS
DIASTOLIC BLOOD PRESSURE: 82 MMHG | OXYGEN SATURATION: 98 % | SYSTOLIC BLOOD PRESSURE: 122 MMHG | WEIGHT: 152.4 LBS | HEIGHT: 61 IN | BODY MASS INDEX: 28.77 KG/M2 | TEMPERATURE: 97.7 F | HEART RATE: 68 BPM

## 2022-07-07 DIAGNOSIS — H35.3290 EXUDATIVE AGE-RELATED MACULAR DEGENERATION, UNSPECIFIED LATERALITY, UNSPECIFIED STAGE (HCC): ICD-10-CM

## 2022-07-07 DIAGNOSIS — E03.9 HYPOTHYROIDISM, UNSPECIFIED TYPE: Primary | ICD-10-CM

## 2022-07-07 DIAGNOSIS — I10 ESSENTIAL HYPERTENSION: ICD-10-CM

## 2022-07-07 DIAGNOSIS — G44.83 COUGH HEADACHE: Primary | ICD-10-CM

## 2022-07-07 DIAGNOSIS — E78.2 MIXED HYPERLIPIDEMIA: ICD-10-CM

## 2022-07-07 NOTE — TELEPHONE ENCOUNTER
Pt is scheduled in December, 2022 for an AWV  Do you want her to have bw done  Let pt know  She will go to [address] [place] [address] to have her bw done

## 2022-07-19 DIAGNOSIS — E03.9 HYPOTHYROIDISM, UNSPECIFIED TYPE: ICD-10-CM

## 2022-07-20 RX ORDER — LEVOTHYROXINE SODIUM 112 UG/1
TABLET ORAL
Qty: 90 TABLET | Refills: 1 | Status: SHIPPED | OUTPATIENT
Start: 2022-07-20

## 2022-10-01 ENCOUNTER — APPOINTMENT (OUTPATIENT)
Dept: RADIOLOGY | Age: 77
End: 2022-10-01
Payer: MEDICARE

## 2022-10-01 ENCOUNTER — OFFICE VISIT (OUTPATIENT)
Dept: URGENT CARE | Age: 77
End: 2022-10-01
Payer: MEDICARE

## 2022-10-01 VITALS
TEMPERATURE: 97.1 F | WEIGHT: 152 LBS | OXYGEN SATURATION: 98 % | HEART RATE: 78 BPM | HEIGHT: 62 IN | RESPIRATION RATE: 18 BRPM | SYSTOLIC BLOOD PRESSURE: 138 MMHG | BODY MASS INDEX: 27.97 KG/M2 | DIASTOLIC BLOOD PRESSURE: 76 MMHG

## 2022-10-01 DIAGNOSIS — S99.921A INJURY OF TOE ON RIGHT FOOT, INITIAL ENCOUNTER: ICD-10-CM

## 2022-10-01 DIAGNOSIS — S92.514A CLOSED NONDISPLACED FRACTURE OF PROXIMAL PHALANX OF LESSER TOE OF RIGHT FOOT, INITIAL ENCOUNTER: Primary | ICD-10-CM

## 2022-10-01 PROCEDURE — 73660 X-RAY EXAM OF TOE(S): CPT

## 2022-10-01 PROCEDURE — G0463 HOSPITAL OUTPT CLINIC VISIT: HCPCS | Performed by: PHYSICIAN ASSISTANT

## 2022-10-01 PROCEDURE — 99213 OFFICE O/P EST LOW 20 MIN: CPT | Performed by: PHYSICIAN ASSISTANT

## 2022-10-01 NOTE — PROGRESS NOTES
[facility]        NAME: Stacey Fernandez is a 76 y o  female  : 1945    MRN: [id number]  DATE: 2022  TIME: 1:11 PM    Assessment and Plan   Closed nondisplaced fracture of proximal phalanx of lesser toe of right foot, initial encounter [S92 514A]  1  Closed nondisplaced fracture of proximal phalanx of lesser toe of right foot, initial encounter  Ambulatory Referral to Orthopedic Surgery   2  Injury of toe on right foot, initial encounter  XR toe right second min 2 views         Patient Instructions       Follow up with PCP in 3-5 days  Proceed to  ER if symptoms worsen  Chief Complaint     Chief Complaint   Patient presents with    Toe Pain     Right 2nd toe pain/injury x 3 weeks         History of Present Illness       Patient is a 49-year-old female presents to the office complaining of right 2nd toe injury that happened 3 weeks ago  Patient states that she tried to push it chair a and injured her right 2nd toe  Patient has increased pain and swelling since then  Patient has been able to ambulate  Patient has no other injuries  Toe Pain   Incident onset: Three weeks ago  The incident occurred at home  Injury mechanism: Pushing chair with her foot  Pain location: Right 2nd toe  The quality of the pain is described as aching  The pain is at a severity of 5/10  The pain is moderate  Pertinent negatives include no inability to bear weight  She reports no foreign bodies present  The symptoms are aggravated by weight bearing  She has tried nothing for the symptoms  Review of Systems   Review of Systems   Constitutional: Negative  HENT: Negative  Eyes: Negative  Respiratory: Negative  Cardiovascular: Negative  Endocrine: Negative  Genitourinary: Negative  Musculoskeletal: Positive for arthralgias and joint swelling  Skin: Negative  All other systems reviewed and are negative          Current Medications       Current Outpatient Medications:    atorvastatin (LIPITOR) 10 mg tablet, TAKE 1 TABLET EVERY DAY, Disp: 90 tablet, Rfl: 1    bimatoprost (LUMIGAN) 0 01 % ophthalmic drops, Administer into the left eye , Disp: , Rfl:     levothyroxine 112 mcg tablet, TAKE 1 TABLET EVERY DAY, Disp: 90 tablet, Rfl: 1    losartan-hydrochlorothiazide (HYZAAR) 50-12 5 mg per tablet, TAKE 1 TABLET EVERY DAY, Disp: 90 tablet, Rfl: 1    ranibizumab (Lucentis) 0 3 mg/0 05mL, 0 3 mg by Intravitreal route once , Disp: , Rfl:     venlafaxine (EFFEXOR-XR) 75 mg 24 hr capsule, TAKE 1 CAPSULE EVERY DAY, Disp: 90 capsule, Rfl: 1    acetaminophen (TYLENOL) 650 mg CR tablet, , Disp: , Rfl:     aspirin 81 mg chewable tablet, Chew Daily (Patient not taking: Reported on 7/7/2022), Disp: , Rfl:     Carboxymethylcellulose Sodium (Refresh Liquigel) 1 % GEL, as needed (Patient not taking: Reported on 10/1/2022), Disp: , Rfl:     diazepam (VALIUM) 2 mg tablet, Take 2 5 tablets (5 mg total) by mouth every 12 (twelve) hours as needed (vertigo) for up to 4 doses (Patient not taking: No sig reported), Disp: 4 tablet, Rfl: 0    Diclofenac Sodium (VOLTAREN) 1 %, Apply 2 g topically 4 (four) times a day (Patient not taking: No sig reported), Disp: 350 g, Rfl: 1    LORazepam (ATIVAN) 0 5 mg tablet, Take 1 tablet (0 5 mg total) by mouth 2 (two) times a day as needed for anxiety (Patient not taking: Reported on 10/1/2022), Disp: 60 tablet, Rfl: 0    Lutein 20 MG TABS, 1 tablet by mouth daily, Disp: , Rfl:     meclizine (ANTIVERT) 12 5 MG tablet, Take 1 tablet (12 5 mg total) by mouth 3 (three) times a day as needed for dizziness for up to 10 doses (Patient not taking: No sig reported), Disp: 30 tablet, Rfl: 0    methylPREDNISolone 4 MG tablet therapy pack, Use as directed on package (Patient not taking: Reported on 10/1/2022), Disp: 21 tablet, Rfl: 0    Multiple Vitamins-Minerals (PRESERVISION AREDS PO), , Disp: , Rfl:     pravastatin (PRAVACHOL) 40 mg tablet, Take by mouth Daily, Disp: , Rfl:     Current Allergies     Allergies as of 10/01/2022 - Reviewed 10/01/2022   Allergen Reaction Noted    Sulfa antibiotics Rash 06/01/2016            The following portions of the patient's history were reviewed and updated as appropriate: allergies, current medications, past family history, past medical history, past social history, past surgical history and problem list      Past Medical History:   Diagnosis Date    Depression     Diverticulosis     Glaucoma 2015    medication    Hemorrhoids 12/2009    Hyperlipidemia     Hypertension     Hypothyroidism        Past Surgical History:   Procedure Laterality Date    CARPAL TUNNEL RELEASE Bilateral     2017, 2018    CATARACT EXTRACTION Left 03/03/2021    HYSTERECTOMY      age 62    OOPHORECTOMY Bilateral age 62       Family History   Problem Relation Age of Onset    Hypertension Mother     Diabetes Father     Brain cancer Sister     No Known Problems Daughter     No Known Problems Maternal Grandmother     No Known Problems Maternal Grandfather     No Known Problems Paternal Grandmother     No Known Problems Paternal Grandfather     No Known Problems Sister     No Known Problems Sister     No Known Problems Sister     No Known Problems Daughter     No Known Problems Daughter     No Known Problems Paternal Aunt     No Known Problems Paternal Aunt     No Known Problems Paternal Aunt     No Known Problems Paternal Aunt          Medications have been verified  Objective   /76   Pulse 78   Temp (!) 97 1 °F (36 2 °C)   Resp 18   Ht 5' 2" (1 575 m)   Wt 68 9 kg (152 lb)   SpO2 98%   BMI 27 80 kg/m²   No LMP recorded  Patient is postmenopausal        Physical Exam     Physical Exam  Vitals and nursing note reviewed  Constitutional:       Appearance: Normal appearance  She is normal weight  HENT:      Head: Normocephalic  Eyes:      General:         Right eye: No discharge  Left eye: No discharge  Extraocular Movements: Extraocular movements intact  Conjunctiva/sclera: Conjunctivae normal    Pulmonary:      Effort: Pulmonary effort is normal    Musculoskeletal:         General: Swelling and tenderness present  No deformity  Right lower leg: No edema  Left lower leg: No edema  Comments: Examination reveals full rom of rt knee and ankle joints with no limitation of swelling  Pt has no calf pain or Achilles tendon pain   Examination reveal good pedal pulses    Examination reveals pain and swelling rt 2nd to  Pt has no ecchymosis proximally    Skin:     General: Skin is warm  Capillary Refill: Capillary refill takes less than 2 seconds  Coloration: Skin is not jaundiced or pale  Findings: No bruising, erythema, lesion or rash  Neurological:      General: No focal deficit present  Mental Status: She is alert  Mental status is at baseline  Cranial Nerves: No cranial nerve deficit  Sensory: No sensory deficit  Motor: No weakness        Coordination: Coordination normal       Gait: Gait normal       Deep Tendon Reflexes: Reflexes normal    Psychiatric:         Mood and Affect: Mood normal

## 2022-10-06 ENCOUNTER — HOSPITAL ENCOUNTER (OUTPATIENT)
Dept: BONE DENSITY | Facility: MEDICAL CENTER | Age: 77
Discharge: HOME | End: 2022-10-06
Payer: MEDICARE

## 2022-10-06 DIAGNOSIS — E28.310 SYMPTOMATIC PREMATURE MENOPAUSE: ICD-10-CM

## 2022-10-06 DIAGNOSIS — Z13.820 OSTEOPOROSIS SCREENING: ICD-10-CM

## 2022-10-06 PROCEDURE — 77080 DXA BONE DENSITY AXIAL: CPT

## 2022-10-07 ENCOUNTER — TELEPHONE (OUTPATIENT)
Dept: FAMILY MEDICINE CLINIC | Facility: CLINIC | Age: 77
End: 2022-10-07

## 2022-10-07 NOTE — TELEPHONE ENCOUNTER
----- Message from Brian Ríos MD sent at 10/7/2022 12:46 PM EDT -----  Please call patient to discuss that DEXA scan shows low bone mass recommend calcium vitamin-D and resistance exercise we can recheck in 2 years

## 2022-10-10 NOTE — TELEPHONE ENCOUNTER
Pt was notified of her Dexa results, but she also said that she has been very feeling tired and fatigue and will like to know if that can be her potassium

## 2022-10-11 NOTE — TELEPHONE ENCOUNTER
Recommend liquids with electrolytes   Uncertain whether that will improve her symptoms but if not improving she should call back

## 2022-10-12 PROBLEM — Z00.00 MEDICARE ANNUAL WELLNESS VISIT, SUBSEQUENT: Status: RESOLVED | Noted: 2021-12-20 | Resolved: 2022-10-12

## 2022-11-05 ENCOUNTER — APPOINTMENT (EMERGENCY)
Dept: CT IMAGING | Facility: HOSPITAL | Age: 77
End: 2022-11-05

## 2022-11-05 ENCOUNTER — HOSPITAL ENCOUNTER (INPATIENT)
Facility: HOSPITAL | Age: 77
LOS: 1 days | Discharge: HOME | End: 2022-11-07
Attending: EMERGENCY MEDICINE | Admitting: INTERNAL MEDICINE

## 2022-11-05 DIAGNOSIS — H35.3290 EXUDATIVE AGE-RELATED MACULAR DEGENERATION, UNSPECIFIED LATERALITY, UNSPECIFIED STAGE (HCC): ICD-10-CM

## 2022-11-05 DIAGNOSIS — I10 ESSENTIAL HYPERTENSION: ICD-10-CM

## 2022-11-05 DIAGNOSIS — R42 VERTIGO: Primary | ICD-10-CM

## 2022-11-05 DIAGNOSIS — F32.A DEPRESSIVE DISORDER: ICD-10-CM

## 2022-11-05 LAB
ALBUMIN SERPL BCP-MCNC: 3.2 G/DL (ref 3.5–5)
ALP SERPL-CCNC: 46 U/L (ref 46–116)
ALT SERPL W P-5'-P-CCNC: 24 U/L (ref 12–78)
ANION GAP SERPL CALCULATED.3IONS-SCNC: 6 MMOL/L (ref 4–13)
AST SERPL W P-5'-P-CCNC: 13 U/L (ref 5–45)
BACTERIA UR QL AUTO: ABNORMAL /HPF
BASOPHILS # BLD AUTO: 0.04 THOUSANDS/ÂΜL (ref 0–0.1)
BASOPHILS NFR BLD AUTO: 1 % (ref 0–1)
BILIRUB DIRECT SERPL-MCNC: 0.11 MG/DL (ref 0–0.2)
BILIRUB SERPL-MCNC: 0.61 MG/DL (ref 0.2–1)
BILIRUB UR QL STRIP: NEGATIVE
BUN SERPL-MCNC: 28 MG/DL (ref 5–25)
CALCIUM SERPL-MCNC: 9.1 MG/DL (ref 8.3–10.1)
CHLORIDE SERPL-SCNC: 107 MMOL/L (ref 96–108)
CLARITY UR: CLEAR
CO2 SERPL-SCNC: 31 MMOL/L (ref 21–32)
COLOR UR: YELLOW
CREAT SERPL-MCNC: 0.62 MG/DL (ref 0.6–1.3)
EOSINOPHIL # BLD AUTO: 0.17 THOUSAND/ÂΜL (ref 0–0.61)
EOSINOPHIL NFR BLD AUTO: 3 % (ref 0–6)
ERYTHROCYTE [DISTWIDTH] IN BLOOD BY AUTOMATED COUNT: 12.7 % (ref 11.6–15.1)
GFR SERPL CREATININE-BSD FRML MDRD: 86 ML/MIN/1.73SQ M
GLUCOSE SERPL-MCNC: 95 MG/DL (ref 65–140)
GLUCOSE UR STRIP-MCNC: NEGATIVE MG/DL
HCT VFR BLD AUTO: 40.1 % (ref 34.8–46.1)
HGB BLD-MCNC: 13.3 G/DL (ref 11.5–15.4)
HGB UR QL STRIP.AUTO: NEGATIVE
IMM GRANULOCYTES # BLD AUTO: 0.02 THOUSAND/UL (ref 0–0.2)
IMM GRANULOCYTES NFR BLD AUTO: 0 % (ref 0–2)
KETONES UR STRIP-MCNC: NEGATIVE MG/DL
LEUKOCYTE ESTERASE UR QL STRIP: ABNORMAL
LYMPHOCYTES # BLD AUTO: 2.2 THOUSANDS/ÂΜL (ref 0.6–4.47)
LYMPHOCYTES NFR BLD AUTO: 32 % (ref 14–44)
MAGNESIUM SERPL-MCNC: 2.2 MG/DL (ref 1.6–2.6)
MCH RBC QN AUTO: 32 PG (ref 26.8–34.3)
MCHC RBC AUTO-ENTMCNC: 33.2 G/DL (ref 31.4–37.4)
MCV RBC AUTO: 96 FL (ref 82–98)
MONOCYTES # BLD AUTO: 0.87 THOUSAND/ÂΜL (ref 0.17–1.22)
MONOCYTES NFR BLD AUTO: 13 % (ref 4–12)
NEUTROPHILS # BLD AUTO: 3.59 THOUSANDS/ÂΜL (ref 1.85–7.62)
NEUTS SEG NFR BLD AUTO: 51 % (ref 43–75)
NITRITE UR QL STRIP: NEGATIVE
NON-SQ EPI CELLS URNS QL MICRO: ABNORMAL /HPF
NRBC BLD AUTO-RTO: 0 /100 WBCS
PH UR STRIP.AUTO: 7 [PH] (ref 4.5–8)
PLATELET # BLD AUTO: 235 THOUSANDS/UL (ref 149–390)
PMV BLD AUTO: 10.1 FL (ref 8.9–12.7)
POTASSIUM SERPL-SCNC: 3.7 MMOL/L (ref 3.5–5.3)
PROT SERPL-MCNC: 6.8 G/DL (ref 6.4–8.4)
PROT UR STRIP-MCNC: NEGATIVE MG/DL
RBC # BLD AUTO: 4.16 MILLION/UL (ref 3.81–5.12)
RBC #/AREA URNS AUTO: ABNORMAL /HPF
SODIUM SERPL-SCNC: 144 MMOL/L (ref 135–147)
SP GR UR STRIP.AUTO: 1.02 (ref 1–1.03)
TSH SERPL DL<=0.05 MIU/L-ACNC: 0.86 UIU/ML (ref 0.45–4.5)
UROBILINOGEN UR QL STRIP.AUTO: 0.2 E.U./DL
WBC # BLD AUTO: 6.89 THOUSAND/UL (ref 4.31–10.16)
WBC #/AREA URNS AUTO: ABNORMAL /HPF

## 2022-11-05 RX ORDER — LORAZEPAM 2 MG/ML
0.5 INJECTION INTRAMUSCULAR ONCE AS NEEDED
Status: DISCONTINUED | OUTPATIENT
Start: 2022-11-05 | End: 2022-11-07

## 2022-11-05 RX ORDER — ENOXAPARIN SODIUM 100 MG/ML
40 INJECTION SUBCUTANEOUS DAILY
Status: DISCONTINUED | OUTPATIENT
Start: 2022-11-06 | End: 2022-11-07

## 2022-11-05 RX ORDER — SODIUM CHLORIDE 9 MG/ML
75 INJECTION, SOLUTION INTRAVENOUS CONTINUOUS
Status: DISCONTINUED | OUTPATIENT
Start: 2022-11-05 | End: 2022-11-06

## 2022-11-05 RX ORDER — MECLIZINE HCL 12.5 MG/1
25 TABLET ORAL EVERY 8 HOURS SCHEDULED
Status: DISCONTINUED | OUTPATIENT
Start: 2022-11-05 | End: 2022-11-07

## 2022-11-05 RX ORDER — VENLAFAXINE HYDROCHLORIDE 75 MG/1
75 CAPSULE, EXTENDED RELEASE ORAL DAILY
Status: DISCONTINUED | OUTPATIENT
Start: 2022-11-06 | End: 2022-11-07 | Stop reason: HOSPADM

## 2022-11-05 RX ORDER — ASPIRIN 81 MG/1
81 TABLET, CHEWABLE ORAL DAILY
Status: DISCONTINUED | OUTPATIENT
Start: 2022-11-06 | End: 2022-11-07

## 2022-11-05 RX ORDER — ASPIRIN 325 MG
325 TABLET ORAL ONCE
Status: COMPLETED | OUTPATIENT
Start: 2022-11-05 | End: 2022-11-05

## 2022-11-05 RX ORDER — DIAZEPAM 5 MG/ML
2.5 INJECTION, SOLUTION INTRAMUSCULAR; INTRAVENOUS EVERY 6 HOURS PRN
Status: DISCONTINUED | OUTPATIENT
Start: 2022-11-05 | End: 2022-11-06

## 2022-11-05 RX ORDER — DIAZEPAM 5 MG/ML
2.5 INJECTION, SOLUTION INTRAMUSCULAR; INTRAVENOUS ONCE
Status: COMPLETED | OUTPATIENT
Start: 2022-11-05 | End: 2022-11-05

## 2022-11-05 RX ORDER — ATORVASTATIN CALCIUM 40 MG/1
40 TABLET, FILM COATED ORAL EVERY EVENING
Status: DISCONTINUED | OUTPATIENT
Start: 2022-11-05 | End: 2022-11-07 | Stop reason: HOSPADM

## 2022-11-05 RX ORDER — LEVOTHYROXINE SODIUM 112 UG/1
112 TABLET ORAL
Status: DISCONTINUED | OUTPATIENT
Start: 2022-11-06 | End: 2022-11-07 | Stop reason: HOSPADM

## 2022-11-05 RX ORDER — MECLIZINE HYDROCHLORIDE 25 MG/1
25 TABLET ORAL ONCE
Status: COMPLETED | OUTPATIENT
Start: 2022-11-05 | End: 2022-11-05

## 2022-11-05 RX ADMIN — ASPIRIN 325 MG ORAL TABLET 325 MG: 325 PILL ORAL at 23:02

## 2022-11-05 RX ADMIN — ATORVASTATIN CALCIUM 40 MG: 40 TABLET, FILM COATED ORAL at 23:02

## 2022-11-05 RX ADMIN — SODIUM CHLORIDE 1000 ML: 0.9 INJECTION, SOLUTION INTRAVENOUS at 17:13

## 2022-11-05 RX ADMIN — DIAZEPAM 2.5 MG: 10 INJECTION, SOLUTION INTRAMUSCULAR; INTRAVENOUS at 17:13

## 2022-11-05 RX ADMIN — SODIUM CHLORIDE 75 ML/HR: 0.9 INJECTION, SOLUTION INTRAVENOUS at 23:04

## 2022-11-05 RX ADMIN — DIAZEPAM 2.5 MG: 10 INJECTION, SOLUTION INTRAMUSCULAR; INTRAVENOUS at 19:07

## 2022-11-05 RX ADMIN — MECLIZINE HYDROCHLORIDE 25 MG: 25 TABLET ORAL at 21:11

## 2022-11-05 RX ADMIN — MECLIZINE 25 MG: 12.5 TABLET ORAL at 23:02

## 2022-11-05 NOTE — ED PROVIDER NOTES
History  Chief Complaint   Patient presents with   • Dizziness     Patient with dizziness starting this morning  States hx of vertigo, took meclizine and states symptoms worsened  Patient denies CP, SOB, change in speech, weakness     75 YO female presents with dizziness on waking this morning, 9 hours PTA  Pt states she felt generally well on going to sleep last night but, on waking, she was unable to get out of bed due to dizziness  Pt states she was finally able to get up and tried some meclizine but feels this did worsen her symptoms  Pt states she has had vertigo in the past and this feels somewhat similar  The vertigo is present at all times but worse when trying to get up, she has had significant difficulty with ambulation due to her vertigo  She has some mild frontal headache, denies weakness, no changes in vision or hearing  Pt denies CP/SOB/F/C/N/V/D/C, no dysuria, burning on urination or blood in urine  History provided by:  Patient   used: No    Dizziness  Quality:  Room spinning  Severity:  Moderate  Onset quality:  Gradual  Duration:  9 hours  Timing:  Constant  Progression:  Waxing and waning  Chronicity:  Recurrent  Context: standing up    Relieved by:  Nothing  Worsened by:  Standing up  Ineffective treatments: Meclinzine  Associated symptoms: headaches    Associated symptoms: no chest pain, no diarrhea, no shortness of breath, no vomiting and no weakness        Prior to Admission Medications   Prescriptions Last Dose Informant Patient Reported? Taking?    Lutein 20 MG TABS 2022 at Unknown time Self Yes Yes   Si tablet by mouth daily   acetaminophen (TYLENOL) 650 mg CR tablet Past Month at Unknown time Self Yes Yes   atorvastatin (LIPITOR) 10 mg tablet 2022 at Unknown time Self No Yes   Sig: TAKE 1 TABLET EVERY DAY   bimatoprost (LUMIGAN) 0 01 % ophthalmic drops 2022 at Unknown time Self Yes Yes   Sig: Administer into the left eye    calcium carbonate-vitamin D 250 mg-3 125 mcg per tablet 2022 at Unknown time  Yes Yes   Sig: Take 2 tablets by mouth daily   levothyroxine 112 mcg tablet 2022 at Unknown time  No Yes   Sig: TAKE 1 TABLET EVERY DAY   losartan-hydrochlorothiazide (HYZAAR) 50-12 5 mg per tablet 2022 at Unknown time Self No Yes   Sig: TAKE 1 TABLET EVERY DAY   meclizine (ANTIVERT) 12 5 MG tablet 2022 at Unknown time  No Yes   Sig: Take 1 tablet (12 5 mg total) by mouth 3 (three) times a day as needed for dizziness for up to 10 doses   pravastatin (PRAVACHOL) 40 mg tablet 2022 at Unknown time Self Yes Yes   Sig: Take by mouth Daily   ranibizumab (Lucentis) 0 3 mg/0 05mL Past Month at Unknown time Self Yes Yes   Si 3 mg by Intravitreal route once    venlafaxine (EFFEXOR-XR) 75 mg 24 hr capsule 2022 at Unknown time Self No Yes   Sig: TAKE 1 CAPSULE EVERY DAY      Facility-Administered Medications: None       Past Medical History:   Diagnosis Date   • Depression    • Diverticulosis    • Glaucoma 2015    medication   • Hemorrhoids 2009   • Hyperlipidemia    • Hypertension    • Hypothyroidism        Past Surgical History:   Procedure Laterality Date   • CARPAL TUNNEL RELEASE Bilateral     ,    • CATARACT EXTRACTION Left 2021   • HYSTERECTOMY      age 62   • OOPHORECTOMY Bilateral age 62       Family History   Problem Relation Age of Onset   • Hypertension Mother    • Diabetes Father    • Brain cancer Sister    • No Known Problems Daughter    • No Known Problems Maternal Grandmother    • No Known Problems Maternal Grandfather    • No Known Problems Paternal Grandmother    • No Known Problems Paternal Grandfather    • No Known Problems Sister    • No Known Problems Sister    • No Known Problems Sister    • No Known Problems Daughter    • No Known Problems Daughter    • No Known Problems Paternal Aunt    • No Known Problems Paternal Aunt    • No Known Problems Paternal Aunt    • No Known Problems Paternal Aunt      I have reviewed and agree with the history as documented  E-Cigarette/Vaping   • E-Cigarette Use Never User      E-Cigarette/Vaping Substances   • Nicotine No    • THC No    • CBD No    • Flavoring No    • Other No    • Unknown No      Social History     Tobacco Use   • Smoking status: Former Smoker     Packs/day: 20 00     Years: 10 00     Pack years: 200 00     Types: Cigarettes     Quit date:      Years since quittin 8   • Smokeless tobacco: Never Used   • Tobacco comment: no passive smoked exposure    Vaping Use   • Vaping Use: Never used   Substance Use Topics   • Alcohol use: Yes     Comment: once day   • Drug use: No       Review of Systems   Constitutional: Negative for chills, fatigue and fever  HENT: Negative for dental problem  Eyes: Negative for visual disturbance  Respiratory: Negative for shortness of breath  Cardiovascular: Negative for chest pain  Gastrointestinal: Negative for abdominal pain, diarrhea and vomiting  Genitourinary: Negative for dysuria and frequency  Musculoskeletal: Negative for arthralgias  Skin: Negative for rash  Neurological: Positive for dizziness and headaches  Negative for weakness and light-headedness  Psychiatric/Behavioral: Negative for agitation, behavioral problems and confusion  All other systems reviewed and are negative  Physical Exam  Physical Exam  Vitals and nursing note reviewed  Constitutional:       Appearance: Normal appearance  HENT:      Head: Normocephalic and atraumatic  Eyes:      Extraocular Movements: Extraocular movements intact  Conjunctiva/sclera: Conjunctivae normal       Pupils: Pupils are equal, round, and reactive to light  Comments: 2 beats of nystagmus B/L  Cardiovascular:      Rate and Rhythm: Normal rate  Pulmonary:      Effort: Pulmonary effort is normal    Abdominal:      General: There is no distension  Musculoskeletal:         General: Normal range of motion  Cervical back: Normal range of motion  Skin:     Findings: No rash  Neurological:      General: No focal deficit present  Mental Status: She is alert  Cranial Nerves: No cranial nerve deficit  Motor: No weakness  Coordination: Finger-Nose-Finger Test normal  Rapid alternating movements normal       Comments: Difficulty with sitting up and standing due to dizziness      Psychiatric:         Mood and Affect: Mood normal          Vital Signs  ED Triage Vitals   Temperature Pulse Respirations Blood Pressure SpO2   11/05/22 1618 11/05/22 1618 11/05/22 1618 11/05/22 1618 11/05/22 1618   97 9 °F (36 6 °C) 91 18 (!) 180/101 99 %      Temp Source Heart Rate Source Patient Position - Orthostatic VS BP Location FiO2 (%)   11/05/22 1618 11/05/22 1618 11/05/22 1714 11/05/22 1714 --   Oral Monitor Lying Right arm       Pain Score       --                  Vitals:    11/05/22 1618 11/05/22 1714 11/05/22 1902 11/05/22 2021   BP: (!) 180/101 147/90 (!) 175/82 168/92   Pulse: 91 80 81 83   Patient Position - Orthostatic VS:  Lying Lying Lying         Visual Acuity      ED Medications  Medications   sodium chloride 0 9 % bolus 1,000 mL (0 mL Intravenous Stopped 11/5/22 1813)   diazepam (VALIUM) injection 2 5 mg (2 5 mg Intravenous Given 11/5/22 1713)   diazepam (VALIUM) injection 2 5 mg (2 5 mg Intravenous Given 11/5/22 1907)   meclizine (ANTIVERT) tablet 25 mg (25 mg Oral Given 11/5/22 2111)       Diagnostic Studies  Results Reviewed     Procedure Component Value Units Date/Time    Urine Microscopic [[id number]]  (Abnormal) Collected: 11/05/22 1848    Lab Status: Final result Specimen: Urine, Clean Catch Updated: 11/05/22 1911     RBC, UA 0-1 /hpf      WBC, UA 2-4 /hpf      Epithelial Cells Occasional /hpf      Bacteria, UA Occasional /hpf     Urine Macroscopic, POC [[id number]]  (Abnormal) Collected: 11/05/22 1848    Lab Status: Final result Specimen: Urine Updated: 11/05/22 1849     Color, UA Yellow Clarity, UA Clear     pH, UA 7 0     Leukocytes, UA Small     Nitrite, UA Negative     Protein, UA Negative mg/dl      Glucose, UA Negative mg/dl      Ketones, UA Negative mg/dl      Urobilinogen, UA 0 2 E U /dl      Bilirubin, UA Negative     Occult Blood, UA Negative     Specific Gravity, UA 1 025    Narrative:      CLINITEK RESULT    Hepatic function panel [[id number]]  (Abnormal) Collected: 11/05/22 1703    Lab Status: Final result Specimen: Blood from Arm, Left Updated: 11/05/22 1735     Total Bilirubin 0 61 mg/dL      Bilirubin, Direct 0 11 mg/dL      Alkaline Phosphatase 46 U/L      AST 13 U/L      ALT 24 U/L      Total Protein 6 8 g/dL      Albumin 3 2 g/dL     Magnesium [[id number]]  (Normal) Collected: 11/05/22 1703    Lab Status: Final result Specimen: Blood from Arm, Left Updated: 11/05/22 1735     Magnesium 2 2 mg/dL     TSH [[id number]]  (Normal) Collected: 11/05/22 1703    Lab Status: Final result Specimen: Blood from Arm, Left Updated: 11/05/22 1735     TSH 3RD GENERATON 0 859 uIU/mL     Narrative:      Patients undergoing fluorescein dye angiography may retain small amounts of fluorescein in the body for 48-72 hours post procedure  Samples containing fluorescein can produce falsely depressed TSH values  If the patient had this procedure,a specimen should be resubmitted post fluorescein clearance        Basic metabolic panel [[id number]]  (Abnormal) Collected: 11/05/22 1703    Lab Status: Final result Specimen: Blood from Arm, Left Updated: 11/05/22 1726     Sodium 144 mmol/L      Potassium 3 7 mmol/L      Chloride 107 mmol/L      CO2 31 mmol/L      ANION GAP 6 mmol/L      BUN 28 mg/dL      Creatinine 0 62 mg/dL      Glucose 95 mg/dL      Calcium 9 1 mg/dL      eGFR 86 ml/min/1 73sq m     Narrative:      [facility] guidelines for Chronic Kidney Disease (CKD):   •  Stage 1 with normal or high GFR (GFR > 90 mL/min/1 73 square meters)  •  Stage 2 Mild CKD (GFR = 60-89 mL/min/1 73 square meters)  •  Stage 3A Moderate CKD (GFR = 45-59 mL/min/1 73 square meters)  •  Stage 3B Moderate CKD (GFR = 30-44 mL/min/1 73 square meters)  •  Stage 4 Severe CKD (GFR = 15-29 mL/min/1 73 square meters)  •  Stage 5 End Stage CKD (GFR <15 mL/min/1 73 square meters)  Note: GFR calculation is accurate only with a steady state creatinine    CBC and differential [[id number]]  (Abnormal) Collected: 11/05/22 1703    Lab Status: Final result Specimen: Blood from Arm, Left Updated: 11/05/22 1710     WBC 6 89 Thousand/uL      RBC 4 16 Million/uL      Hemoglobin 13 3 g/dL      Hematocrit 40 1 %      MCV 96 fL      MCH 32 0 pg      MCHC 33 2 g/dL      RDW 12 7 %      MPV 10 1 fL      Platelets 428 Thousands/uL      nRBC 0 /100 WBCs      Neutrophils Relative 51 %      Immat GRANS % 0 %      Lymphocytes Relative 32 %      Monocytes Relative 13 %      Eosinophils Relative 3 %      Basophils Relative 1 %      Neutrophils Absolute 3 59 Thousands/µL      Immature Grans Absolute 0 02 Thousand/uL      Lymphocytes Absolute 2 20 Thousands/µL      Monocytes Absolute 0 87 Thousand/µL      Eosinophils Absolute 0 17 Thousand/µL      Basophils Absolute 0 04 Thousands/µL                  CT head without contrast   Final Result by Adriel Cunningham MD (11/05 2044)      No acute intracranial abnormality  Workstation performed: [id number]                    Procedures  Procedures         ED Course  ED Course as of 11/05/22 2113   Sat Nov 05, 2022   1754 Patient appears more comfortable lying down but has not tried to get up since medicated  She has received ~200mL of NS, will check back to try to ambulate the patient  1858 Patient states she tried to get up and had worsening dizziness, had to lay down flat  Will CT, give another dose of Valium, if pt does not improve will need admission                                               MDM  Number of Diagnoses or Management Options  Vertigo: new and requires workup  Diagnosis management comments: 1  Vertigo - Pt states she has had similar in the past, she tried her medication butthis made it worse  She does note some concern for her elevated blood pressure on arrival  Will check urine for infection, metabolic panel for electrolyte abnormalities and dehydration, CBC for anemia and leukocytosis  Will give IV fluids and try Valium  Amount and/or Complexity of Data Reviewed  Clinical lab tests: ordered and reviewed  Tests in the radiology section of CPT®: ordered and reviewed  Obtain history from someone other than the patient: yes  Review and summarize past medical records: yes  Discuss the patient with other providers: yes  Independent visualization of images, tracings, or specimens: yes    Patient Progress  Patient progress: stable      Disposition  Final diagnoses:   Vertigo     Time reflects when diagnosis was documented in both MDM as applicable and the Disposition within this note     Time User Action Codes Description Comment    11/5/2022  8:53 PM Robinson GARCIA Add [R42] Dizziness     11/5/2022  8:53 PM Robinson GARCIA Remove [R42] Dizziness     11/5/2022  8:53 PM Robinson GARCIA Add [R42] Vertigo       ED Disposition     ED Disposition   Admit    Condition   Stable    Date/Time   Sat Nov 5, 2022  8:53 PM    Comment   Case was discussed with ABRAM and the patient's admission status was agreed to be  to the service of Dr Argelia Hope   Follow-up Information    None         Patient's Medications   Discharge Prescriptions    No medications on file       No discharge procedures on file      PDMP Review     None          ED Provider  Electronically Signed by           Nimo Latif MD  11/05/22 4592

## 2022-11-06 ENCOUNTER — APPOINTMENT (OUTPATIENT)
Dept: CT IMAGING | Facility: HOSPITAL | Age: 77
End: 2022-11-06

## 2022-11-06 LAB
CHOLEST SERPL-MCNC: 186 MG/DL
EST. AVERAGE GLUCOSE BLD GHB EST-MCNC: 114 MG/DL
HBA1C MFR BLD: 5.6 %
HDLC SERPL-MCNC: 75 MG/DL
LDLC SERPL CALC-MCNC: 92 MG/DL (ref 0–100)
TRIGL SERPL-MCNC: 95 MG/DL

## 2022-11-06 RX ADMIN — IOHEXOL 85 ML: 350 INJECTION, SOLUTION INTRAVENOUS at 01:27

## 2022-11-06 RX ADMIN — VENLAFAXINE HYDROCHLORIDE 75 MG: 75 CAPSULE, EXTENDED RELEASE ORAL at 08:42

## 2022-11-06 RX ADMIN — ASPIRIN 81 MG CHEWABLE TABLET 81 MG: 81 TABLET CHEWABLE at 08:42

## 2022-11-06 RX ADMIN — LEVOTHYROXINE SODIUM 112 MCG: 112 TABLET ORAL at 05:01

## 2022-11-06 RX ADMIN — MECLIZINE 25 MG: 12.5 TABLET ORAL at 13:21

## 2022-11-06 RX ADMIN — BIMATOPROST 1 DROP: 0.1 SOLUTION/ DROPS OPHTHALMIC at 21:55

## 2022-11-06 RX ADMIN — MECLIZINE 25 MG: 12.5 TABLET ORAL at 05:01

## 2022-11-06 RX ADMIN — MECLIZINE 25 MG: 12.5 TABLET ORAL at 21:54

## 2022-11-06 RX ADMIN — BIMATOPROST 1 DROP: 0.1 SOLUTION/ DROPS OPHTHALMIC at 00:32

## 2022-11-06 RX ADMIN — ATORVASTATIN CALCIUM 40 MG: 40 TABLET, FILM COATED ORAL at 17:55

## 2022-11-06 RX ADMIN — ENOXAPARIN SODIUM 40 MG: 40 INJECTION SUBCUTANEOUS at 08:42

## 2022-11-06 NOTE — PHYSICAL THERAPY NOTE
PHYSICAL THERAPY EVALUATION          Patient Name: Shoaib Sullivan  DXBSS'X Date: 11/6/2022  PT EVALUATION    76 y o     [id number]    Vertigo [R42]    Past Medical History:   Diagnosis Date    Depression     Diverticulosis     Glaucoma 2015    medication    Hemorrhoids 12/2009    Hyperlipidemia     Hypertension     Hypothyroidism      Past Surgical History:   Procedure Laterality Date    CARPAL TUNNEL RELEASE Bilateral     2017, 2018    CATARACT EXTRACTION Left 03/03/2021    HYSTERECTOMY      age 62    OOPHORECTOMY Bilateral age 62        11/06/22 1145   PT Last Visit   PT Visit Date 11/06/22   Note Type   Note type Evaluation   Pain Assessment   Pain Assessment Tool 0-10   Pain Score No Pain   Restrictions/Precautions   Other Precautions Telemetry; Fall Risk   Home Living   Type of [address] [place] [address] Multi-level; Laundry in basement;Able to live on main level with bedroom/bathroom;Stairs to enter without rails   Bathroom Shower/Tub Walk-in shower  (in basement  tub shower first floor)   Bathroom Toilet   (has both standard and raised toilets)   [address] [place] [address] chair   Home Equipment   (denies)   Additional Comments 1 BERRY  first floor set up but goes to basement level for laundry and shower   Prior Function   Level of [place] Independent with ADLs; Independent with functional mobility; Independent with IADLS   Lives With Significant other   IADLs Independent with driving; Independent with meal prep; Independent with medication management   Falls in the last 6 months 1 to 4   Vocational Part time employment   Comments indep at baseline without an AD  lives w s/o who can assist but does use RW to amb   General   Additional Pertinent History pt admitted 11/5/22 for vertigo  up w assist orders  pt reports hx of vertigo tx w medication   PMHx significant for depression, glaucoma   Cognition   Overall Cognitive Status WFL   Arousal/Participation Cooperative   Attention Within functional limits   Orientation Level Oriented X4   Memory Within functional limits   Following Commands Follows all commands and directions without difficulty   Comments demonstrates good insight into deficits   RLE Assessment   RLE Assessment WFL  (4+)   LLE Assessment   LLE Assessment WFL  (4+)   Coordination   Sensation WFL   Bed Mobility   Supine to Sit 5  Supervision   Additional items HOB elevated; Increased time required   Transfers   Sit to Stand 5  Supervision   Additional items Increased time required   Stand to Sit 5  Supervision   Additional items Increased time required   Toilet transfer 5  Supervision   Additional items Standard toilet; Increased time required   Ambulation/Elevation   Gait pattern Excessively slow   Gait Assistance 5  Supervision   Additional items Assist x 1   Assistive Device Rolling walker   Distance >200'   Stair Management Assistance Not tested   Balance   Static Standing Fair  (w RW)   Dynamic Standing Fair -   Ambulatory Fair -  (w RW)   Endurance Deficit   Endurance Deficit No   Activity Tolerance   Activity Tolerance Patient tolerated treatment well;Treatment limited secondary to medical complications (Comment)  (dizziness)   Medical Staff [facility] OT   Nurse Made [place] RN   Assessment   Prognosis Good   Problem List Impaired balance   Assessment Marcio Dumont is a 76 y o  female admitted to [address] on 11/5/2022 for Vertigo  PT was consulted and pt was seen on 11/6/2022 for mobility assessment and d/c planning  Pt presents w medium fall risk, telemetry monitoring  At baseline is indep for ADLs, IADLs and ambulation without an AD  Pt is currently functioning at a supervision assistance x1 level for bed mobility, transfers and ambulation w RW  Did attempt ambulation a few feet without an AD however pt very apprehensive and noted significant improvement in balance, gait sequencing and pt comfort w use of walker   Reporting mild dizziness during mobility however still able to ambulate limited community distances  Pt will benefit from continued skilled IP PT to address the above mentioned impairments  in order to maximize recovery and increase functional independence when completing mobility and ADLs  Currently PT recommendations for DME include RW  At this time PT recommendations for d/c are home w OP PT (vestibular therapy) when medically stable  Barriers to Discharge None   Goals   Patient Goals found out whats causing dizziness   STG Expiration Date 11/16/22   Short Term Goal #1 1)  Pt will perform bed mobility with Reinaldo demonstrating appropriate technique 100% of the time in order to improve function  2)  Perform all transfers with Reinaldo demonstrating safe and appropriate technique 100% of the time in order to improve ability to negotiate safely in home environment  3) Amb with least restrictive AD > 350'x1 with mod I in order to demonstrate ability to negotiate in home environment  4)  Improve overall strength and balance 1/2 grade in order to optimize ability to perform functional tasks and reduce fall risk  5) Increase activity tolerance to 45 minutes in order to improve endurance to functional tasks  6)  Negotiate stairs using most appropriate technique and mod I in order to be able to negotiate safely in home environment  7) PT for ongoing patient and family/caregiver education, DME needs and d/c planning in order to promote highest level of function in least restrictive environment  Plan   Treatment/Interventions Functional transfer training;LE strengthening/ROM; Elevations; Therapeutic exercise;Patient/family training;Equipment eval/education;Gait training;Bed mobility;Spoke to nursing;OT;Compensatory technique education   PT Frequency 2-3x/wk   Recommendation   PT Discharge Recommendation Home with outpatient rehabilitation  (vestibular therapy)   Equipment Recommended [facility] Recommended Wheeled walker   Change/add to [employer]?  No   AM-PAC Basic Mobility Inpatient Turning in Bed Without Bedrails 4   Lying on Back to Sitting on Edge of Flat Bed 3   Moving Bed to Chair 3   Standing Up From Chair 3   Walk in Room 3   Climb 3-5 Stairs 3   Basic Mobility Inpatient Raw Score 19   Basic Mobility Standardized Score 42 48   Highest Level Of Mobility   -HLM Goal 6: Walk 10 steps or more   JH-HLM Achieved 7: Walk 25 feet or more   End of Consult   Patient Position at End of Consult Bedside chair; All needs within reach   History: co - morbidities, fall risk, use of AD  Exam: impairments in systems including musculoskeletal (strength), neuromuscular (balance, gait, transfers, motor function and sensation), am-pac, cognition  Clinical: unstable/unpredictable  Complexity:high      Karl Baker PT, DPT

## 2022-11-06 NOTE — ASSESSMENT & PLAN NOTE
Suggestive of vertigo however persists with eyes closed, preceded by gait instability day pta and with some b/l intermittent dysmetria b/l on FtN testing  -ct head negative for acute pathology  ekg nsr  Had prior episode 1 year ago and 12 years ago but was self limiting w/valium  Never seen ENT or neuro for prior episodes  -given somewhat mixed picture w/persistence of s/sx and metabolic risk factors will start stroke pathway     No tPA given >4 5 h window  -asa 325mg po x1 then asa 81mg po daily, permissive htn x48h  -check cta head neck and mri brain  check flp hgb a1c echo telemetry  -consult neuro, pt/ot

## 2022-11-06 NOTE — OCCUPATIONAL THERAPY NOTE
Occupational Therapy Evaluation     Patient Name: Alla Patel  QLUQN'R Date: 11/6/2022  Problem List  Principal Problem:    Vertigo  Active Problems:    Essential hypertension    Mixed hyperlipidemia    Past Medical History  Past Medical History:   Diagnosis Date    Depression     Diverticulosis     Glaucoma 2015    medication    Hemorrhoids 12/2009    Hyperlipidemia     Hypertension     Hypothyroidism      Past Surgical History  Past Surgical History:   Procedure Laterality Date    CARPAL TUNNEL RELEASE Bilateral     2017, 2018    CATARACT EXTRACTION Left 03/03/2021    HYSTERECTOMY      age 62    OOPHORECTOMY Bilateral age 62           11/06/22 1144   OT Last Visit   OT Visit Date 11/06/22   Note Type   Note type Evaluation   Pain Assessment   Pain Assessment Tool 0-10   Pain Score No Pain   Restrictions/Precautions   Weight Bearing Precautions Per Order No   Other Precautions Fall Risk;Telemetry   Home Living   Type of [address] [place] [address] One level; Laundry in basement  (1 step to enter)   [employer]  (in basement)   [employer] Raised  (1 raised 1 standard)   [address] [place] [address]   Bathroom Accessibility Accessible   Additional Comments pt lives w/ significant other who is able to help as needed   Prior Function   Level of [place] Independent with functional mobility; Independent with ADLs; Independent with IADLS   Lives With Significant other   Receives Help From Family   IADLs Independent with driving; Independent with meal prep; Independent with medication management   Falls in the last 6 months 1 to 4  (1 trip and fall)   Vocational Part time employment   Comments pt active w/ no use of DME, driving   Lifestyle   Autonomy per pt independent w/ ADLs, independent w/ functional transfers and mobiltiy w/ no AD, independent w/ IADLs, driving   Reciprocal Relationships significant other, daughter, niece   Service to Others worked in [employer] 68, 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[address] [employer] taking care of animals, dancing   Subjective   Subjective "I am doing well"   ADL   Where Assessed Chair   Eating Assistance 6  Modified independent   Grooming Assistance 5  Supervision/Setup   Grooming Deficit Setup;Supervision/safety; Increased time to complete;Wash/dry hands; Wash/dry face   UB Bathing Assistance 5  Supervision/Setup   LB Bathing Assistance 5  Supervision/Setup   UB Dressing Assistance 5  Supervision/Setup   LB Dressing Assistance 5  Supervision/Setup   Toileting Assistance  5  Supervision/Setup   Functional Assistance 5  Supervision/Setup   Bed Mobility   Supine to Sit 5  Supervision   Additional items Increased time required;Verbal cues; Bedrails;HOB elevated   Transfers   Sit to Stand 5  Supervision   Additional items Increased time required;Armrests   Stand to Sit 5  Supervision   Additional items Increased time required;Verbal cues;Armrests   Toilet transfer 5  Supervision   Additional items Assist x 1; Increased time required;Verbal cues;Standard toilet  (grab bar use)   Additional Comments cues for hand placement   Functional Mobility   Functional Mobility 5  Supervision   Additional Comments close supervision w/ RW, contact guard w/ no AD   Additional items Rolling walker   Balance   Static Sitting Good   Dynamic Sitting Fair +   Static Standing Fair   Dynamic Standing Fair -   Activity Tolerance   Activity Tolerance Patient tolerated treatment well;Patient limited by fatigue  (dizziness)   Nurse Made Aware appropriate to see per Leta SANCHEZ Assessment   RUE Assessment WFL  (4/5)   LUE Assessment   LUE Assessment WFL  (4/5)   Hand Function   Gross Motor Coordination Functional  (increased time)   Fine Motor Coordination Functional   Sensation   Light Touch No apparent deficits   Proprioception   Proprioception No apparent deficits   Vision-Basic Assessment   Current Vision Wears glasses all the time   Vision - Complex Assessment   Ocular Range of Motion Intact   Tracking Intact   Acuity Able to read clock/calendar on wall without difficulty   Perception   Inattention/Neglect Appears intact   Cognition   Overall Cognitive Status [facility]   Arousal/Participation Alert; Cooperative   Attention Within functional limits   Orientation Level Oriented X4   Memory Within functional limits   Following Commands Follows all commands and directions without difficulty   Comments engages in appropriate conversations   Assessment   Limitation Decreased ADL status; Decreased UE strength;Decreased Safe judgement during ADL;Decreased cognition;Decreased endurance;Decreased self-care trans;Decreased high-level ADLs   Prognosis Good   Assessment Pt is a 76 y o  female seen for OT evaluation s/p admit to [facility] on 11/5/2022 w/ Vertigo, dizziness and off balance  CT head: No acute intracranial abnormality  MRI brain pending  Comorbidities affecting pt's functional performance at time of assessment include: hyperlipidemia, HTN  Personal factors affecting pt at time of IE include:difficulty performing IADLS , dizziness w/ movement  Prior to admission, pt was  independent w/ ADLs, independent w/ functional transfers and mobiltiy w/ no AD, independent w/ IADLs, driving, working part time  Upon evaluation: Pt requires supervision supine>sit bed mobility (educated on focusing on stationary items while making transitions), supervision sit<>stand, supervision functional mobility w/ RW and contact guard assist w/ no AD apprehension noted, supervision LB ADLs, steadying assist grooming at sink, setup UB ADLs 2* the following deficits impacting occupational performance: dizziness w/ standing and mobility, impaired balance, impaired activity tolerance, increased fatigue, decreased endurance  Pt educated on pacing self, positioning of items and use of shower chair at home, discussed benefit of RW until she feels more steady and dizziness subsides, pt receptive   Pt to benefit from continued skilled OT tx while in the hospital to address deficits as defined above and maximize level of functional independence w ADL's and functional mobility  Occupational Performance areas to address include: grooming, bathing/shower, toilet hygiene, dressing, health maintenance, functional mobility, clothing management, cleaning and meal prep  From OT standpoint, recommendation at time of d/c would be home w/ family support and outpatient vestibular therapy  The patient's raw score on the AM-PAC Daily Activity inpatient short form is 20, standardized score is 42 03, greater than 39 4  Patients at this level are likely to benefit from discharge to home  Please refer to the recommendation of the Occupational Therapist for safe discharge planning  Goals   Patient Goals "get rid of the dizziness"   LTG Time Frame 10-14   Long Term Goal please see below goals   Plan   Treatment Interventions ADL retraining;Functional transfer training;UE strengthening/ROM; Endurance training;Cognitive reorientation;Patient/family training;Equipment evaluation/education; Compensatory technique education; Energy conservation; Activityengagement   Goal Expiration Date 11/20/22   OT Frequency 2-3x/wk   Recommendation   OT Discharge Recommendation Home with outpatient rehabilitation  (outpatient vestibular therapy)   Equipment Recommended   (rolling walker)   AM-PAC Daily Activity Inpatient   Lower Body Dressing 3   Bathing 3   Toileting 3   Upper Body Dressing 4   Grooming 3   Eating 4   Daily Activity Raw Score 20   Daily Activity Standardized Score (Calc for Raw Score >=11) 42 03   AM-PAC Applied Cognition Inpatient   Following a Speech/Presentation 4   Understanding Ordinary Conversation 4   Taking Medications 4   Remembering Where Things Are Placed or Put Away 4   Remembering List of 4-5 Errands 4   Taking Care of Complicated Tasks 4   Applied Cognition Raw Score 24   Applied Cognition Standardized Score 62 21 Modified [place] Scale   Modified [place] Scale 4   End of Consult   Education Provided Yes   Patient Position at End of Consult Bedside chair; All needs within reach   Nurse Communication Nurse aware of consult     Occupational Therapy Goals to be met in 10-14 days:  1) Pt will improve activity tolerance to G for 30 min txment sessions to enhance ADLs  2) Pt will complete ADLs/self care w/ mod I   3) Pt will complete toileting w/ mod I w/ G hygiene/thoroughness using DME PRN  4) Pt will improve functional transfers on/off all surfaces using DME PRN w/ G balance/safety including toileting w/ mod I  5) Pt will improve fx'l mobility during I/ADl/leisure tasks using DME PRN w/ g balance/safety w/ mod I  6) Pt will demonstrate improved bed mobility to MOD I to enhance ADLs  7) Pt will engage in activity configuration activity w/ G participation and mod I to increase time management skills and improve participation in a structured routine to improve overall quality of life  8) Pt will demonstrate improved standing tolerance to 3-5 minutes during functional tasks w/ Good - dynamic standing balance to enhance ADL performance    Documentation completed by: Karlos Armando, MS, OTR/L

## 2022-11-06 NOTE — PLAN OF CARE
Problem: PHYSICAL THERAPY ADULT  Goal: Performs mobility at highest level of function for planned discharge setting  See evaluation for individualized goals  Description: Treatment/Interventions: Functional transfer training, LE strengthening/ROM, Elevations, Therapeutic exercise, Patient/family training, Equipment eval/education, Gait training, Bed mobility, Spoke to nursing, OT, Compensatory technique education  Equipment Recommended: Ainsley Ortega       See flowsheet documentation for full assessment, interventions and recommendations  11/6/2022 1329 by Isidro Owens PT  Note: Prognosis: Good  Problem List: Impaired balance  Assessment: Excell Reason is a 76 y o  [facility]  PT was consulted and pt was seen on 11/6/2022 for mobility assessment and d/c planning  Pt presents w medium fall risk, telemetry monitoring  At baseline is indep for ADLs, IADLs and ambulation without an AD  Pt is currently functioning at a supervision assistance x1 level for bed mobility, transfers and ambulation w RW  Did attempt ambulation a few feet without an AD however pt very apprehensive and noted significant improvement in balance, gait sequencing and pt comfort w use of walker  Reporting mild dizziness during mobility however still able to ambulate limited community distances  Pt will benefit from continued skilled IP PT to address the above mentioned impairments  in order to maximize recovery and increase functional independence when completing mobility and ADLs  Currently PT recommendations for DME include RW  At this time PT recommendations for d/c are home w OP PT (vestibular therapy) when medically stable  Barriers to Discharge: None     PT Discharge Recommendation: Home with outpatient rehabilitation (vestibular therapy)    See flowsheet documentation for full assessment       11/6/2022 1329 by Isidro Owens PT  Note: Prognosis: Good  Problem List: Impaired balance  Assessment: Lashae Strickland is a 76 y o  female admitted to [address] on 11/5/2022 for Vertigo  PT was consulted and pt was seen on 11/6/2022 for mobility assessment and d/c planning  Pt presents w medium fall risk, telemetry monitoring  At baseline is indep for ADLs, IADLs and ambulation without an AD  Pt is currently functioning at a supervision assistance x1 level for bed mobility, transfers and ambulation w RW  Did attempt ambulation a few feet without an AD however pt very apprehensive and noted significant improvement in balance, gait sequencing and pt comfort w use of walker  Reporting mild dizziness during mobility however still able to ambulate limited community distances  Pt will benefit from continued skilled IP PT to address the above mentioned impairments  in order to maximize recovery and increase functional independence when completing mobility and ADLs  Currently PT recommendations for DME include RW  At this time PT recommendations for d/c are home w OP PT (vestibular therapy) when medically stable  Barriers to Discharge: None     PT Discharge Recommendation: Home with outpatient rehabilitation (vestibular therapy)    See flowsheet documentation for full assessment

## 2022-11-06 NOTE — H&P
Nuussuataap Aqq  285 1945, 76 y o  female MRN: [id number]  Unit/Bed#: E4 -01 Encounter: [id number]  Primary Care Provider: Akila Egan MD   Date and time admitted to hospital: 11/5/2022  4:23 PM    * Vertigo  Assessment & Plan  Suggestive of vertigo however persists with eyes closed, preceded by gait instability day pta and with some b/l intermittent dysmetria b/l on FtN testing  -ct head negative for acute pathology  ekg nsr  Had prior episode 1 year ago and 12 years ago but was self limiting w/valium  Never seen ENT or neuro for prior episodes  -given somewhat mixed picture w/persistence of s/sx and metabolic risk factors will start stroke pathway  No tPA given >4 5 h window  -asa 325mg po x1 then asa 81mg po daily, permissive htn x48h  -check cta head neck and mri brain  check flp hgb a1c echo telemetry  -consult neuro, pt/ot    Mixed hyperlipidemia  Assessment & Plan  Empiric high dose sattin, f/u flp    Essential hypertension  Assessment & Plan  Hold oral meds for permissive htn x48h      VTE Prophylaxis: Enoxaparin (Lovenox)  / sequential compression device   Code Status: fc  POLST: There is no POLST form on file for this patient (pre-hospital)  Discussion with family:     Anticipated Length of Stay:  Patient will be admitted on an Observation basis with an anticipated length of stay of  less than 2 midnights  Justification for Hospital Stay: vertigo r/o cva    Total Time for Visit, including Counseling / Coordination of Care: 45 minutes  Greater than 50% of this total time spent on direct patient counseling and coordination of care  Chief Complaint:   dizziness    History of Present Illness: Day Chacon is a 76 y o  female who presents with pmh of htn hld, hypothyroid, hx of dizziness coming to hospital for dizziness  Pt notes she felt off balance day pta without true dizziness    She had a fall to her knees the week prior and onto her hand  She noted she did hit her head slightly with that but had no problems thereafter and was in her normal state of health until day pta  On day of admission she woke up feeling dizzy  She initially calls this room spinning sensation but then notes it's more like she is spinning not the room and denies room spinning sensation  Denies feeling presyncopal   She found what sounds like an old meclizine tablet trialed it with relief and was able to get to breakfast   S/sx recurred in the afternoon and were not responsive to meclizine tab  She felt nauseous without resolution of s/sx so came in for evaluation  She notes worse w/sitting up and looking down w/eyes open or closed  No tinnitus presently although has this intermittently  No ear fullness decreased hearing and no recent uri or gastroenteritis  No numbness/weakness/blurry vision or loss of vision  Had prior episode 1 year ago at this facility and was  D/c'd from ED w/negative CT scan after improvement w/valium  Had another episode approx 12 years prior as well per pt report  Does not see ENT/neuro and has never undergone vestibular therapy  S/sx in ed did not improve w/2 doses of valium or meclizine so admission was requested  Review of Systems:    Review of Systems   Gastrointestinal: Positive for nausea  Neurological: Positive for dizziness and headaches  All other systems reviewed and are negative        Past Medical and Surgical History:     Past Medical History:   Diagnosis Date   • Depression    • Diverticulosis    • Glaucoma 2015    medication   • Hemorrhoids 12/2009   • Hyperlipidemia    • Hypertension    • Hypothyroidism        Past Surgical History:   Procedure Laterality Date   • CARPAL TUNNEL RELEASE Bilateral     2017, 2018   • CATARACT EXTRACTION Left 03/03/2021   • HYSTERECTOMY      age 62   • OOPHORECTOMY Bilateral age 62       Meds/Allergies:    Prior to Admission medications    Medication Sig Start Date End Date Taking?  Authorizing Provider   acetaminophen (TYLENOL) 650 mg CR tablet    Yes Historical Provider, MD   atorvastatin (LIPITOR) 10 mg tablet TAKE 1 TABLET EVERY DAY 5/12/22  Yes Wayne Bhakta MD   bimatoprost (LUMIGAN) 0 01 % ophthalmic drops Administer into the left eye    Yes Historical Provider, MD   calcium carbonate-vitamin D 250 mg-3 125 mcg per tablet Take 2 tablets by mouth daily   Yes Historical Provider, MD   levothyroxine 112 mcg tablet TAKE 1 TABLET EVERY DAY 7/20/22  Yes Wayne Bhakta MD   losartan-hydrochlorothiazide [facility]) 50-12 5 mg per tablet TAKE 1 TABLET EVERY DAY 5/6/22  Yes Wayne Bhakta MD   Lutein 20 MG TABS 1 tablet by mouth daily 2/14/13  Yes Historical Provider, MD   meclizine (ANTIVERT) 12 5 MG tablet Take 1 tablet (12 5 mg total) by mouth 3 (three) times a day as needed for dizziness for up to 10 doses 6/13/20  Yes Clora Copping, DO   pravastatin (PRAVACHOL) 40 mg tablet Take by mouth Daily   Yes Historical Provider, MD   ranibizumab (Lucentis) 0 3 mg/0 05mL 0 3 mg by Intravitreal route once    Yes Historical Provider, MD   venlafaxine (EFFEXOR-XR) 75 mg 24 hr capsule TAKE 1 CAPSULE EVERY DAY 5/12/22  Yes Wayne Bhakta MD   aspirin 81 mg chewable tablet Chew Daily  Patient not taking: Reported on 7/7/2022 11/5/22  Historical Provider, MD   Carboxymethylcellulose Sodium (Refresh Liquigel) 1 % GEL as needed  Patient not taking: Reported on 10/1/2022  11/5/22  Historical Provider, MD   diazepam (VALIUM) 2 mg tablet Take 2 5 tablets (5 mg total) by mouth every 12 (twelve) hours as needed (vertigo) for up to 4 doses  Patient not taking: No sig reported 6/13/20 11/5/22  Clora Copping, DO   Diclofenac Sodium (VOLTAREN) 1 % Apply 2 g topically 4 (four) times a day  Patient not taking: No sig reported 12/16/21 11/5/22  Wayne Bhakta MD   LORazepam (ATIVAN) 0 5 mg tablet Take 1 tablet (0 5 mg total) by mouth 2 (two) times a day as needed for anxiety  Patient not taking: Reported on 10/1/2022 11/26/18 11/5/22  Ish Ragsdale MD   methylPREDNISolone 4 MG tablet therapy pack Use as directed on package  Patient not taking: Reported on 10/1/2022 12/21/21 11/5/22  Ángela Lucero PA-C   Multiple Vitamins-Minerals (PRESERVISION AREDS PO)   22  Historical Provider, MD     I have reviewed home medications with patient personally  Allergies: Allergies   Allergen Reactions   • Sulfa Antibiotics Rash       Social History:     Marital Status:     Occupation:   Patient Pre-hospital Living Situation:   Patient Pre-hospital Level of Mobility:   Patient Pre-hospital Diet Restrictions:   Substance Use History:   Social History     Substance and Sexual Activity   Alcohol Use Yes    Comment: once day     Social History     Tobacco Use   Smoking Status Former Smoker   • Packs/day: 20 00   • Years: 10 00   • Pack years: 200 00   • Types: Cigarettes   • Quit date: 12   • Years since quittin 8   Smokeless Tobacco Never Used   Tobacco Comment    no passive smoked exposure      Social History     Substance and Sexual Activity   Drug Use No       Family History:    Family History   Problem Relation Age of Onset   • Hypertension Mother    • Diabetes Father    • Brain cancer Sister    • No Known Problems Daughter    • No Known Problems Maternal Grandmother    • No Known Problems Maternal Grandfather    • No Known Problems Paternal Grandmother    • No Known Problems Paternal Grandfather    • No Known Problems Sister    • No Known Problems Sister    • No Known Problems Sister    • No Known Problems Daughter    • No Known Problems Daughter    • No Known Problems Paternal Aunt    • No Known Problems Paternal Aunt    • No Known Problems Paternal Aunt    • No Known Problems Paternal Aunt        Physical Exam:     Vitals:   Blood Pressure: 164/92 (22)  Pulse: 73 (22)  Temperature: 97 8 °F (36 6 °C) (22)  Temp Source: Temporal (22 2131)  Respirations: 18 (11/05/22 2131)  SpO2: 97 % (11/05/22 2131)    Physical Exam  Vitals reviewed  Constitutional:       General: She is not in acute distress  Appearance: She is not ill-appearing, toxic-appearing or diaphoretic  HENT:      Head: Normocephalic and atraumatic  Right Ear: External ear normal       Left Ear: External ear normal       Nose: Nose normal    Eyes:      Extraocular Movements: Extraocular movements intact  Cardiovascular:      Rate and Rhythm: Normal rate and regular rhythm  Pulmonary:      Effort: No respiratory distress  Breath sounds: No wheezing, rhonchi or rales  Abdominal:      General: There is no distension  Palpations: Abdomen is soft  There is no mass  Tenderness: There is no abdominal tenderness  There is no guarding or rebound  Hernia: No hernia is present  Musculoskeletal:      Right lower leg: No edema  Left lower leg: No edema  Skin:     General: Skin is warm  Neurological:      Mental Status: She is alert  Cranial Nerves: No cranial nerve deficit  Sensory: No sensory deficit  Coordination: Coordination abnormal       Deep Tendon Reflexes: Reflexes normal       Comments: No overt nystagmus fatigable or otherwise reproduced on physical exam compared to ER findings of fatigable horizontal nystagmus  Mild dysmetria noted w/ b/l fingers in left and right visual fields  Psychiatric:         Mood and Affect: Mood normal          (        Be Sure to Include Physical Exam: Delete this entire line when you have entered your exam)    Additional Data:     Lab Results: I have personally reviewed pertinent reports        Results from last 7 days   Lab Units 11/05/22  1703   WBC Thousand/uL 6 89   HEMOGLOBIN g/dL 13 3   HEMATOCRIT % 40 1   PLATELETS Thousands/uL 235   NEUTROS PCT % 51   LYMPHS PCT % 32   MONOS PCT % 13*   EOS PCT % 3     Results from last 7 days   Lab Units 11/05/22  1703   SODIUM mmol/L 144 POTASSIUM mmol/L 3 7   CHLORIDE mmol/L 107   CO2 mmol/L 31   BUN mg/dL 28*   CREATININE mg/dL 0 62   ANION GAP mmol/L 6   CALCIUM mg/dL 9 1   ALBUMIN g/dL 3 2*   TOTAL BILIRUBIN mg/dL 0 61   ALK PHOS U/L 46   ALT U/L 24   AST U/L 13   GLUCOSE RANDOM mg/dL 95                       Imaging: I have personally reviewed pertinent reports  CT head without contrast   Final Result by Chuy Fernandez MD (11/05 2044)      No acute intracranial abnormality  Workstation performed: [id number]             EKG, Pathology, and Other Studies Reviewed on Admission:   · EKG: nsr    Allscripts / Epic Records Reviewed: Yes     ** Please Note: This note has been constructed using a voice recognition system   **

## 2022-11-06 NOTE — PROGRESS NOTES
Progress Note - Excell [facility] 76 y o  female MRN: [id number]    Unit/Bed#: E4 -01 Encounter: [id number]    Assessment/Plan:    Vertigo    acute but has had previous episodes, improving with meclizine, await MRI, does report some left inner ear pain     Dyslipidemia   LDL 92, continue Lipitor 10 milligram      Hypothyroid   Continue Synthroid 112 micrograms        Hypertension   holding hypertensive medications and monitor    MDD    mood stable with Effexor    Subjective:   Still feeling of lightheadedness/head spinning but much less than yesterday denies chest pain or shortness of breath no nausea vomiting today no fevers chills appetite is okay    Objective:     Vitals: Blood pressure 136/77, pulse 73, temperature 97 6 °F (36 4 °C), temperature source Temporal, resp  rate 20, SpO2 93 %  ,There is no height or weight on file to calculate BMI        Results from last 7 days   Lab Units 11/05/22  1703   WBC Thousand/uL 6 89   HEMOGLOBIN g/dL 13 3   HEMATOCRIT % 40 1   PLATELETS Thousands/uL 235     Results from last 7 days   Lab Units 11/05/22  1703   POTASSIUM mmol/L 3 7   CHLORIDE mmol/L 107   CO2 mmol/L 31   BUN mg/dL 28*   CREATININE mg/dL 0 62   CALCIUM mg/dL 9 1   ALK PHOS U/L 46   ALT U/L 24   AST U/L 13       Scheduled Meds:  Current Facility-Administered Medications   Medication Dose Route Frequency Provider Last Rate   • aspirin  81 mg Oral Daily Basia Bradley PA-C     • atorvastatin  40 mg Oral QPM Basia Bradley PA-C     • bimatoprost  1 drop Left Eye HS Basia Bradley PA-C     • diazepam  2 5 mg Intravenous Q6H PRN Basia Bradley PA-C     • enoxaparin  40 mg Subcutaneous Daily Basia Bradley PA-C     • levothyroxine  112 mcg Oral Early Morning Basia Bradley PA-C     • LORazepam  0 5 mg Intravenous Once PRN Basia Bradley PA-C     • meclizine  25 mg Oral [facility] Basia Bradley PA-C     • sodium chloride  75 mL/hr Intravenous Continuous Basia Bradley PA-C 75 mL/hr (11/05/22 2304)   • venlafaxine  75 mg Oral Daily Basia Bradley PA-C         Continuous Infusions:  sodium chloride, 75 mL/hr, Last Rate: 75 mL/hr (11/05/22 2304)          Physical exam:  General appearance:  Alert no distress interaction appropriate  Head/Eyes:  Nonicteric EOMI PERRL   Neck:  Supple  Lungs: CTA bilateral no wheezing rhonchi or rales  Heart: normal S1 S2 regular  Abdomen: Soft nontender with bowel sounds  Extremities: no edema  Skin: no rash    Invasive Devices  Report    Peripheral Intravenous Line  Duration           Peripheral IV 11/05/22 Left Antecubital <1 day                  VTE Pharmacologic Prophylaxis:  Lovenox    Counseling / Coordination of Care  Total floor / unit time spent today 30  minutes  Greater than 50% of total time was spent with the patient and / or family counseling and / or coordination of care    A description of the counseling / coordination of care:

## 2022-11-06 NOTE — PLAN OF CARE
Problem: Potential for Falls  Goal: Patient will remain free of falls  Description: INTERVENTIONS:  - Educate patient/family on patient safety including physical limitations  - Instruct patient to call for assistance with activity   - Consult OT/PT to assist with strengthening/mobility   - Keep Call bell within reach  - Keep bed low and locked with side rails adjusted as appropriate  - Keep care items and personal belongings within reach  - Initiate and maintain comfort rounds  - Make Fall Risk Sign visible to staff  - Offer Toileting every  Hours, in advance of need  - Initiate/Maintain alarm  - Obtain necessary fall risk management equipment:   - Apply yellow socks and bracelet for high fall risk patients  - Consider moving patient to room near nurses station  Outcome: Progressing     Problem: PAIN - ADULT  Goal: Verbalizes/displays adequate comfort level or baseline comfort level  Description: Interventions:  - Encourage patient to monitor pain and request assistance  - Assess pain using appropriate pain scale  - Administer analgesics based on type and severity of pain and evaluate response  - Implement non-pharmacological measures as appropriate and evaluate response  - Consider cultural and social influences on pain and pain management  - Notify physician/advanced practitioner if interventions unsuccessful or patient reports new pain  Outcome: Progressing     Problem: INFECTION - ADULT  Goal: Absence or prevention of progression during hospitalization  Description: INTERVENTIONS:  - Assess and monitor for signs and symptoms of infection  - Monitor lab/diagnostic results  - Monitor all insertion sites, i e  indwelling lines, tubes, and drains  - Monitor endotracheal if appropriate and nasal secretions for changes in amount and color  - [place] appropriate cooling/warming therapies per order  - Administer medications as ordered  - Instruct and encourage patient and family to use good hand hygiene technique  - Identify and instruct in appropriate isolation precautions for identified infection/condition  Outcome: Progressing  Goal: Absence of fever/infection during neutropenic period  Description: INTERVENTIONS:  - Monitor WBC    Outcome: Progressing     Problem: SAFETY ADULT  Goal: Patient will remain free of falls  Description: INTERVENTIONS:  - Educate patient/family on patient safety including physical limitations  - Instruct patient to call for assistance with activity   - Consult OT/PT to assist with strengthening/mobility   - Keep Call bell within reach  - Keep bed low and locked with side rails adjusted as appropriate  - Keep care items and personal belongings within reach  - Initiate and maintain comfort rounds  - Make Fall Risk Sign visible to staff  - Offer Toileting every  Hours, in advance of need  - Initiate/Maintain alarm  - Obtain necessary fall risk management equipment:   - Apply yellow socks and bracelet for high fall risk patients  - Consider moving patient to room near nurses station  Outcome: Progressing  Goal: Maintain or return to baseline ADL function  Description: INTERVENTIONS:  -  Assess patient's ability to carry out ADLs; assess patient's baseline for ADL function and identify physical deficits which impact ability to perform ADLs (bathing, care of mouth/teeth, toileting, grooming, dressing, etc )  - Assess/evaluate cause of self-care deficits   - Assess range of motion  - Assess patient's mobility; develop plan if impaired  - Assess patient's need for assistive devices and provide as appropriate  - Encourage maximum independence but intervene and supervise when necessary  - Involve family in performance of ADLs  - Assess for home care needs following discharge   - Consider OT consult to assist with ADL evaluation and planning for discharge  - Provide patient education as appropriate  Outcome: Progressing  Goal: Maintains/Returns to pre admission functional level  Description: INTERVENTIONS:  - Perform BMAT or MOVE assessment daily    - Set and communicate daily mobility goal to care team and patient/family/caregiver  - Collaborate with rehabilitation services on mobility goals if consulted  - Perform Range of Motion  times a day  - Reposition patient every hours  - Dangle patient  times a day  - Stand patient times a day  - Ambulate patient times a day  - Out of bed to chair  times a day   - Out of bed for meals times a day  - Out of bed for toileting  - Record patient progress and toleration of activity level   Outcome: Progressing     Problem: DISCHARGE PLANNING  Goal: Discharge to home or other facility with appropriate resources  Description: INTERVENTIONS:  - Identify barriers to discharge w/patient and caregiver  - Arrange for needed discharge resources and transportation as appropriate  - Identify discharge learning needs (meds, wound care, etc )  - Arrange for interpretive services to assist at discharge as needed  - Refer to Case Management Department for coordinating discharge planning if the patient needs post-hospital services based on physician/advanced practitioner order or complex needs related to functional status, cognitive ability, or social support system  Outcome: Progressing     Problem: Knowledge Deficit  Goal: Patient/family/caregiver demonstrates understanding of disease process, treatment plan, medications, and discharge instructions  Description: Complete learning assessment and assess knowledge base    Interventions:  - Provide teaching at level of understanding  - Provide teaching via preferred learning methods  Outcome: Progressing

## 2022-11-06 NOTE — PLAN OF CARE
Problem: OCCUPATIONAL THERAPY ADULT  Goal: Performs self-care activities at highest level of function for planned discharge setting  See evaluation for individualized goals  Description: Treatment Interventions: ADL retraining, Functional transfer training, UE strengthening/ROM, Endurance training, Cognitive reorientation, Patient/family training, Equipment evaluation/education, Compensatory technique education, Energy conservation, Activityengagement  Equipment Recommended:  (rolling walker)       See flowsheet documentation for full assessment, interventions and recommendations  Note: Limitation: Decreased ADL status, Decreased UE strength, Decreased Safe judgement during ADL, Decreased cognition, Decreased endurance, Decreased self-care trans, Decreased high-level ADLs  Prognosis: Good  Assessment: Pt is a 76 y o  female seen for OT evaluation s/p admit to [facility] on 11/5/2022 w/ Vertigo, dizziness and off balance  CT head: No acute intracranial abnormality  MRI brain pending  Comorbidities affecting pt's functional performance at time of assessment include: hyperlipidemia, HTN  Personal factors affecting pt at time of IE include:difficulty performing IADLS , dizziness w/ movement  Prior to admission, pt was  independent w/ ADLs, independent w/ functional transfers and mobiltiy w/ no AD, independent w/ IADLs, driving, working part time  Upon evaluation: Pt requires supervision supine>sit bed mobility (educated on focusing on stationary items while making transitions), supervision sit<>stand, supervision functional mobility w/ RW and contact guard assist w/ no AD apprehension noted, supervision LB ADLs, steadying assist grooming at sink, setup UB ADLs 2* the following deficits impacting occupational performance: dizziness w/ standing and mobility, impaired balance, impaired activity tolerance, increased fatigue, decreased endurance   Pt educated on pacing self, positioning of items and use of shower chair at home, discussed benefit of RW until she feels more steady and dizziness subsides, pt receptive  Pt to benefit from continued skilled OT tx while in the hospital to address deficits as defined above and maximize level of functional independence w ADL's and functional mobility  Occupational Performance areas to address include: grooming, bathing/shower, toilet hygiene, dressing, health maintenance, functional mobility, clothing management, cleaning and meal prep  From OT standpoint, recommendation at time of d/c would be home w/ family support and outpatient vestibular therapy  The patient's raw score on the AM-PAC Daily Activity inpatient short form is 20, standardized score is 42 03, greater than 39 4  Patients at this level are likely to benefit from discharge to home  Please refer to the recommendation of the Occupational Therapist for safe discharge planning       OT Discharge Recommendation: Home with outpatient rehabilitation (outpatient vestibular therapy)

## 2022-11-07 ENCOUNTER — APPOINTMENT (INPATIENT)
Dept: NON INVASIVE DIAGNOSTICS | Facility: HOSPITAL | Age: 77
End: 2022-11-07

## 2022-11-07 ENCOUNTER — APPOINTMENT (INPATIENT)
Dept: MRI IMAGING | Facility: HOSPITAL | Age: 77
End: 2022-11-07

## 2022-11-07 VITALS
HEART RATE: 83 BPM | SYSTOLIC BLOOD PRESSURE: 141 MMHG | BODY MASS INDEX: 27.97 KG/M2 | DIASTOLIC BLOOD PRESSURE: 84 MMHG | TEMPERATURE: 97.2 F | HEIGHT: 62 IN | OXYGEN SATURATION: 98 % | WEIGHT: 152 LBS | RESPIRATION RATE: 20 BRPM

## 2022-11-07 LAB
AORTIC ROOT: 2.8 CM
AORTIC VALVE MEAN VELOCITY: 8.9 M/S
AV LVOT MEAN GRADIENT: 1 MMHG
AV LVOT PEAK GRADIENT: 2 MMHG
AV MEAN GRADIENT: 3 MMHG
AV PEAK GRADIENT: 5 MMHG
AV VELOCITY RATIO: 0.62
DOP CALC AO PEAK VEL: 1.14 M/S
DOP CALC AO VTI: 20.43 CM
DOP CALC LVOT PEAK VEL VTI: 18.1 CM
DOP CALC LVOT PEAK VEL: 0.71 M/S
E WAVE DECELERATION TIME: 197 MS
FRACTIONAL SHORTENING: 27 (ref 28–44)
INTERVENTRICULAR SEPTUM IN DIASTOLE (PARASTERNAL SHORT AXIS VIEW): 1.1 CM
INTERVENTRICULAR SEPTUM: 1.1 CM (ref 0.6–1.1)
LAAS-AP2: 13.2 CM2
LAAS-AP4: 11 CM2
LEFT ATRIUM SIZE: 3.7 CM
LEFT INTERNAL DIMENSION IN SYSTOLE: 3.6 CM (ref 2.1–4)
LEFT VENTRICULAR INTERNAL DIMENSION IN DIASTOLE: 4.9 CM (ref 3.5–6)
LEFT VENTRICULAR POSTERIOR WALL IN END DIASTOLE: 1 CM
LEFT VENTRICULAR STROKE VOLUME: 58 ML
LVSV (TEICH): 58 ML
MV E'TISSUE VEL-LAT: 9 CM/S
MV E'TISSUE VEL-SEP: 7 CM/S
MV PEAK A VEL: 0.69 M/S
MV PEAK E VEL: 53 CM/S
MV STENOSIS PRESSURE HALF TIME: 57 MS
MV VALVE AREA P 1/2 METHOD: 3.86
RIGHT ATRIAL 2D VOLUME: 44 ML
RIGHT ATRIUM AREA SYSTOLE A4C: 15.2 CM2
SL CV LEFT ATRIUM LENGTH A2C: 4.1 CM
SL CV LV EF: 42
SL CV PED ECHO LEFT VENTRICLE DIASTOLIC VOLUME (MOD BIPLANE) 2D: 113 ML
SL CV PED ECHO LEFT VENTRICLE SYSTOLIC VOLUME (MOD BIPLANE) 2D: 55 ML

## 2022-11-07 RX ORDER — MECLIZINE HCL 12.5 MG/1
25 TABLET ORAL EVERY 8 HOURS PRN
Status: DISCONTINUED | OUTPATIENT
Start: 2022-11-07 | End: 2022-11-07 | Stop reason: HOSPADM

## 2022-11-07 RX ORDER — LOSARTAN POTASSIUM 50 MG/1
50 TABLET ORAL DAILY
Qty: 30 TABLET | Refills: 0 | Status: SHIPPED | OUTPATIENT
Start: 2022-11-08

## 2022-11-07 RX ORDER — MECLIZINE HYDROCHLORIDE 25 MG/1
25 TABLET ORAL EVERY 8 HOURS PRN
Qty: 30 TABLET | Refills: 0 | Status: SHIPPED | OUTPATIENT
Start: 2022-11-07

## 2022-11-07 RX ORDER — LOSARTAN POTASSIUM 50 MG/1
50 TABLET ORAL DAILY
Status: DISCONTINUED | OUTPATIENT
Start: 2022-11-07 | End: 2022-11-07 | Stop reason: HOSPADM

## 2022-11-07 RX ADMIN — ENOXAPARIN SODIUM 40 MG: 40 INJECTION SUBCUTANEOUS at 10:31

## 2022-11-07 RX ADMIN — VENLAFAXINE HYDROCHLORIDE 75 MG: 75 CAPSULE, EXTENDED RELEASE ORAL at 10:30

## 2022-11-07 RX ADMIN — ASPIRIN 81 MG CHEWABLE TABLET 81 MG: 81 TABLET CHEWABLE at 10:31

## 2022-11-07 RX ADMIN — MECLIZINE 25 MG: 12.5 TABLET ORAL at 05:34

## 2022-11-07 RX ADMIN — LOSARTAN POTASSIUM 50 MG: 50 TABLET, FILM COATED ORAL at 10:30

## 2022-11-07 RX ADMIN — LEVOTHYROXINE SODIUM 112 MCG: 112 TABLET ORAL at 05:34

## 2022-11-07 NOTE — ASSESSMENT & PLAN NOTE
76 y o  female with history of vertigo, tinnitus in the right ear, HTN, HLD, hypothyroidism, glaucoma, and depression who presented to [facility] on 11/5/2022 with room spinning dizziness upon waking and inability to get out of bed  Dizziness is constant, but worse with movements especially sitting up or when looking down  Dizziness resolves when lying perfectly still  Patient has had vertigo in the past similar to this presentation  She took meclizine with improvement in symptoms, but symptoms returned and were not responsive to meclizine later in the day  Upon arrival to the ED, /101  CT head negative for acute intracranial abnormalities  CTA head/neck negative for LVO, aneurysm, significant stenosis, or dissection  Patient was loaded with aspirin 325 mg and received several doses of Valium and Antivert without improvement in symptoms  Etiology for symptoms may be due to peripheral vestibulopathy (BPPV vs Meniere's disease), but cannot rule out central source with possible CVA  Plan:  - Stroke pathway  · MRI brain pending  · Echo completed; final report pending  · LDL 92, Cholesterol 186  · Hemoglobin A1c 5 6  · TSH normal  · S/p aspirin 325 mg load x1  · Started on Aspirin 81 mg daily (antiplatelet naive)  · Atorvastatin 40 mg daily (patient was taking atorvastatin 10 mg daily PTA)  · Meclizine 25 mg Q8 hours and Valium PRN  · Normotensive, euglycemic, normothermic goal  · Continue telemetry  · PT/OT/ST; would benefit from vestibular therapy  · If MRI is negative for stroke, recommend following up with ENT outpatient  · Frequent neuro checks  Continue to monitor and notify neurology with any changes  · STAT CT head for any acute change in neuro exam  - Medical management and supportive care per primary team  Correction of any metabolic or infectious disturbances

## 2022-11-07 NOTE — DISCHARGE SUMMARY
Discharge Summary - Medical Mara Ash 76 y o  female MRN: [id number]    [facility] MRI Room / [address]: [address]/E4 -* Encounter: [id number]    BRIEF OVERVIEW    Admitting Provider: Philippe Walker DO  Discharge Provider: Philippe Walker DO  Admission Date: 11/5/2022     Discharge Date: No discharge date for patient encounter  Primary Care Physician at Discharge: Pipo Kolb [phone]    Primary Discharge Diagnosis    Benign positional vertigo    Other Problems Addressed  Patient Active Problem List    Diagnosis Date Noted   • Vertigo 11/05/2022   • Chronic pain of both shoulders 06/23/2022   • Left hand pain 12/16/2021   • Right rotator cuff tendinitis 09/16/2021   • Preoperative clearance 02/12/2021   • Other age-related cataract 02/12/2021   • Cervicalgia 02/11/2021   • Low back sprain, sequela 02/11/2021   • Ambulatory dysfunction 02/11/2021   • Osteoarthritis 07/09/2020   • Exudative age-related macular degeneration ([facility] [address] ) 03/09/2020   • Overweight 10/30/2014   • Depressive disorder 10/30/2014   • Essential hypertension 10/30/2014   • Hypothyroidism 10/30/2014   • Mixed hyperlipidemia 10/30/2014   • Calcification of coronary artery 02/04/2009       Consulting Providers   Neurology  Therapeutic Operative Procedures Performed  MRI no acute infarction intracranial hemorrhage or mass effect    Discharge Disposition: home    Allergies  Allergies   Allergen Reactions   • Sulfa Antibiotics Rash     Diet restrictions:  Low-salt  Activity restrictions:  As tolerated  Discharge Condition:  Marilyn Qureshi in 1 week  Follow up with consulting providers  ENT in 2 to 3 days       [address] [place] [address]       Presenting Problem/History of Present Illness  [address]    61-year-old female presents with off balance feeling  Benign positional vertigo  patient was seen in consultation with Neurology and initiated on meclizine  For completeness an MRI was negative for acute infarction or mass/hemorrhage  Patient's vertigo improved and she is discharged to have a follow-up with ENT in 2 days  She has had some left inner ear discomfort which may be contributing  Given Rx for meclizine on discharge    Hypertension   medication was held in losartan was restarted, blood pressure 125/62 day of discharge  Reminded eat a low-salt diet     Dyslipidemia   cholesterol 177, LDL 92, continue statin      Discharge  Statement   I spent 35 minutes discharging the patient  This time was spent on the day of discharge  I had direct contact with the patient on the day of discharge  Additional documentation is required if more than 30 minutes were spent on discharge  Explained discharge and follow-up    Discharge instructions/Information to patient and family:   See after visit summary for information provided to patient and family

## 2022-11-07 NOTE — NURSING NOTE
IV removed intact by RN  Patient verbalized understanding of discharge instructions   Patient wheeled out by wheelchair to parking garage by [facility]

## 2022-11-07 NOTE — PROGRESS NOTES
Progress Note - Nyra Chimera 76 y o  female MRN: [id number]    Unit/Bed#: E4 -01 Encounter: [id number]    Assessment/Plan:    Vertigo    acute on chronic, improving with meclizine awaiting MRI, patient had a recent injection for macular degeneration will consult with ophthalmology to see if this is associated side effect       Dyslipidemia   continue statin for LDL control    Hypothyroid   continue Synthroid 112 micrograms daily    Hypertension   trending higher will restart losartan       MDD    mood stable with Effexor    Subjective:   Still feeling of lightheadedness/head spinning slowly improving, denies chest pain no shortness of breath no nausea vomiting no fevers chills appetite good      Objective:     Vitals: Blood pressure 146/67, pulse 86, temperature 97 7 °F (36 5 °C), temperature source Temporal, resp  rate 20, height 5' 2" (1 575 m), weight 68 9 kg (152 lb), SpO2 98 %  ,Body mass index is 27 8 kg/m²        Results from last 7 days   Lab Units 11/05/22  1703   WBC Thousand/uL 6 89   HEMOGLOBIN g/dL 13 3   HEMATOCRIT % 40 1   PLATELETS Thousands/uL 235     Results from last 7 days   Lab Units 11/05/22  1703   POTASSIUM mmol/L 3 7   CHLORIDE mmol/L 107   CO2 mmol/L 31   BUN mg/dL 28*   CREATININE mg/dL 0 62   CALCIUM mg/dL 9 1   ALK PHOS U/L 46   ALT U/L 24   AST U/L 13       Scheduled Meds:  Current Facility-Administered Medications   Medication Dose Route Frequency Provider Last Rate   • aspirin  81 mg Oral Daily Basia Bradley PA-C     • atorvastatin  40 mg Oral QPM Basia Bradley PA-C     • bimatoprost  1 drop Left Eye HS Basia Bradley PA-C     • enoxaparin  40 mg Subcutaneous Daily Basia Bradley PA-C     • levothyroxine  112 mcg Oral Early Morning Basia Bradley PA-C     • LORazepam  0 5 mg Intravenous Once PRN Basia Bradley PA-C     • meclizine  25 mg Oral Q8H [address] Basia Bradley PA-C     • venlafaxine  75 mg Oral Daily Madelyn Omer Prader, PA-C Continuous Infusions:         Physical exam:  General appearance:  Alert no distress interaction appropriate  Head/Eyes:  Nonicteric PERRL EOMI  Neck:  Supple  Lungs: CTA bilateral no wheezing rhonchi or rales  Heart: normal S1 S2 regular  Abdomen: Soft nontender with bowel sounds  Extremities: no edema  Skin: no rash    Invasive Devices  Report    Peripheral Intravenous Line  Duration           Peripheral IV 11/05/22 Left Antecubital 1 day                  VTE Pharmacologic Prophylaxis:  Lovenox      Counseling / Coordination of Care  Total floor / unit time spent today 30  minutes  Greater than 50% of total time was spent with the patient and / or family counseling and / or coordination of care    A description of the counseling / coordination of care:  Discussed with neuro await MRI

## 2022-11-07 NOTE — CONSULTS
Consultation - Neurology   Petrona Damon 76 y o  female MRN: [id number]  Unit/Bed#: E4 -01 Encounter: [id number]      Addendum:  MRI brain negative for acute infarct  Can discontinue aspirin and continue home statin  Follow up with ENT and vestibular therapy  Assessment/Plan     * Vertigo  Assessment & Plan  76 y o  female with history of vertigo, tinnitus in the right ear, HTN, HLD, hypothyroidism, glaucoma, and depression who presented to [facility] on 11/5/2022 with room spinning dizziness upon waking and inability to get out of bed  Dizziness is constant, but worse with movements especially sitting up or when looking down  Dizziness resolves when lying perfectly still  Patient has had vertigo in the past similar to this presentation  She took meclizine with improvement in symptoms, but symptoms returned and were not responsive to meclizine later in the day  Upon arrival to the ED, /101  CT head negative for acute intracranial abnormalities  CTA head/neck negative for LVO, aneurysm, significant stenosis, or dissection  Patient was loaded with aspirin 325 mg and received several doses of Valium and Antivert without improvement in symptoms  Etiology for symptoms may be due to peripheral vestibulopathy (BPPV vs Meniere's disease), but cannot rule out central source with possible CVA  Plan:  - Stroke pathway  · MRI brain pending  · Echo completed; final report pending  · LDL 92, Cholesterol 186  · Hemoglobin A1c 5 6  · TSH normal  · S/p aspirin 325 mg load x1  · Started on Aspirin 81 mg daily (antiplatelet naive)  · Atorvastatin 40 mg daily (patient was taking atorvastatin 10 mg daily PTA)  · Meclizine 25 mg Q8 hours and Valium PRN  · Normotensive, euglycemic, normothermic goal  · Continue telemetry  · PT/OT/ST; would benefit from vestibular therapy  · If MRI is negative for stroke, recommend following up with ENT outpatient  · Frequent neuro checks   Continue to monitor and notify neurology with any changes  · STAT CT head for any acute change in neuro exam  - Medical management and supportive care per primary team  Correction of any metabolic or infectious disturbances  Recommendations for outpatient neurological follow up have yet to be determined  History of Present Illness     Reason for Consult / Principal Problem: dizziness  Hx and PE limited by: N/A  HPI: Cayetano Weaver is a 76 y o  female with history of vertigo, HTN, HLD, hypothyroidism, glaucoma, and depression who presented to [facility] on 11/5/2022 with room-spinning dizziness upon waking and inability to get out of bed  Dizziness was constant, but worse with movements, especially when sitting up and looking down with eyes open or closed  She has tinnitus in the right ear, but denies hearing loss  Denies any other associated neuro deficits  She took a meclizine which improved the dizziness and she was able to eat breakfast and go outside in the garden  However in the afternoon, the dizziness returned and did not improve with meclizine  The day prior, patient was off balance, but denies dizziness  Patient has had vertigo in the past similar to this presentation  The first episode occurred approximately 12 years ago and she had another episode 1 year ago  She had CT head which was normal and symptoms resolved with valium  Upon arrival to the ED, /101  CT head negative for acute intracranial abnormalities  CTA head/neck negative for LVO, aneurysm, significant stenosis, or dissection  Patient was loaded with aspirin 325 mg and received several doses of Valium and Antivert  On exam, patient is lying comfortably in bed in no acute distress  She states that she continues to have the room spinning dizziness and does not feel safe to return home at this time  Dizziness is worse with sitting up from a lying position, walking, or turning her head to the right or looking down    When she is lying completely still, dizziness resolves  Denies any headache, visual disturbances, numbness, tingling, arm/leg weakness, poor coordination, speech disturbances, chest pain, shortness of breath, or abdominal pain  She states this is a similar room spinning sensation to previous vertigo episodes, however symptoms did not last this long  She feels that the meclizine is making the dizziness worse  Inpatient consult to Neurology  Consult performed by: Beulah Garvey PA-C  Consult ordered by: Javier Moise PA-C          Review of Systems   Musculoskeletal: Positive for gait problem  Neurological: Positive for dizziness  All other systems reviewed and are negative        Historical Information   Past Medical History:   Diagnosis Date   • Depression    • Diverticulosis    • Glaucoma     medication   • Hemorrhoids 2009   • Hyperlipidemia    • Hypertension    • Hypothyroidism      Past Surgical History:   Procedure Laterality Date   • CARPAL TUNNEL RELEASE Bilateral     ,    • CATARACT EXTRACTION Left 2021   • HYSTERECTOMY      age 62   • OOPHORECTOMY Bilateral age 62     Social History   Social History     Substance and Sexual Activity   Alcohol Use Yes    Comment: once day     Social History     Substance and Sexual Activity   Drug Use No     E-Cigarette/Vaping   • E-Cigarette Use Never User      E-Cigarette/Vaping Substances   • Nicotine No    • THC No    • CBD No    • Flavoring No    • Other No    • Unknown No      Social History     Tobacco Use   Smoking Status Former Smoker   • Packs/day: 20 00   • Years: 10 00   • Pack years: 200 00   • Types: Cigarettes   • Quit date: 12   • Years since quittin 8   Smokeless Tobacco Never Used   Tobacco Comment    no passive smoked exposure      Family History:   Family History   Problem Relation Age of Onset   • Hypertension Mother    • Diabetes Father    • Brain cancer Sister    • No Known Problems Daughter    • No Known Problems Maternal Grandmother    • No Known Problems Maternal Grandfather    • No Known Problems Paternal Grandmother    • No Known Problems Paternal Grandfather    • No Known Problems Sister    • No Known Problems Sister    • No Known Problems Sister    • No Known Problems Daughter    • No Known Problems Daughter    • No Known Problems Paternal Aunt    • No Known Problems Paternal Aunt    • No Known Problems Paternal Aunt    • No Known Problems Paternal Aunt        Review of previous medical records was completed  Reviewed prior notes, labs, CTA head/neck    Meds/Allergies   all current active meds have been reviewed, current meds:   Current Facility-Administered Medications   Medication Dose Route Frequency   • aspirin chewable tablet 81 mg  81 mg Oral Daily   • atorvastatin (LIPITOR) tablet 40 mg  40 mg Oral QPM   • bimatoprost (LUMIGAN) 0 01 % ophthalmic solution 1 drop  1 drop Left Eye HS   • enoxaparin (LOVENOX) subcutaneous injection 40 mg  40 mg Subcutaneous Daily   • levothyroxine tablet 112 mcg  112 mcg Oral Early Morning   • LORazepam (ATIVAN) injection 0 5 mg  0 5 mg Intravenous Once PRN   • losartan (COZAAR) tablet 50 mg  50 mg Oral Daily   • meclizine (ANTIVERT) tablet 25 mg  25 mg Oral Q8H [address]   • venlafaxine (EFFEXOR-XR) 24 hr capsule 75 mg  75 mg Oral Daily    and PTA meds:   Prior to Admission Medications   Prescriptions Last Dose Informant Patient Reported? Taking?    Lutein 20 MG TABS 2022 at Unknown time Self Yes Yes   Si tablet by mouth daily   acetaminophen (TYLENOL) 650 mg CR tablet Past Month at Unknown time Self Yes Yes   atorvastatin (LIPITOR) 10 mg tablet 2022 at Unknown time Self No Yes   Sig: TAKE 1 TABLET EVERY DAY   bimatoprost (LUMIGAN) 0 01 % ophthalmic drops 2022 at Unknown time Self Yes Yes   Sig: Administer into the left eye    calcium carbonate-vitamin D 250 mg-3 125 mcg per tablet 2022 at Unknown time  Yes Yes   Sig: Take 2 tablets by mouth daily   levothyroxine 112 mcg tablet 2022 at Unknown time  No Yes   Sig: TAKE 1 TABLET EVERY DAY   losartan-hydrochlorothiazide (HYZAAR) 50-12 5 mg per tablet 2022 at Unknown time Self No Yes   Sig: TAKE 1 TABLET EVERY DAY   meclizine (ANTIVERT) 12 5 MG tablet 2022 at Unknown time  No Yes   Sig: Take 1 tablet (12 5 mg total) by mouth 3 (three) times a day as needed for dizziness for up to 10 doses   pravastatin (PRAVACHOL) 40 mg tablet 2022 at Unknown time Self Yes Yes   Sig: Take by mouth Daily   ranibizumab (Lucentis) 0 3 mg/0 05mL Past Month at Unknown time Self Yes Yes   Si 3 mg by Intravitreal route once    venlafaxine (EFFEXOR-XR) 75 mg 24 hr capsule 2022 at Unknown time Self No Yes   Sig: TAKE 1 CAPSULE EVERY DAY      Facility-Administered Medications: None       Allergies   Allergen Reactions   • Sulfa Antibiotics Rash       Objective   Vitals:Blood pressure 146/67, pulse 85, temperature 97 7 °F (36 5 °C), temperature source Temporal, resp  rate 20, SpO2 98 %  ,There is no height or weight on file to calculate BMI  No intake or output data in the 24 hours ending 22 0751    Invasive Devices: Invasive Devices  Report    Peripheral Intravenous Line  Duration           Peripheral IV 22 Left Antecubital 1 day                Physical Exam  Vitals and nursing note reviewed  Constitutional:       Appearance: Normal appearance  Comments: Patient lying comfortably in bed in no acute distress   HENT:      Head: Normocephalic and atraumatic  Mouth/Throat:      Mouth: Mucous membranes are moist       Pharynx: Oropharynx is clear  Eyes:      Extraocular Movements: Extraocular movements intact  Conjunctiva/sclera: Conjunctivae normal       Pupils: Pupils are equal, round, and reactive to light  Pulmonary:      Effort: Pulmonary effort is normal    Musculoskeletal:         General: Normal range of motion  Skin:     General: Skin is warm and dry     Neurological:      Mental Status: She is alert and oriented to person, place, and time  Comments: See full neuro exam below       Neurologic Exam     Mental Status   Oriented to person, place, and time  Patient awake and alert  Oriented to person, place, month, and year  No dysarthria or aphasia  Able to follow simple midline and appendicular commands  Cranial Nerves     CN III, IV, VI   Pupils are equal, round, and reactive to light  Pupils 3 mm, round, reactive to light bilaterally  EOMs intact without nystagmus  No visual field deficits  Facial sensation to light touch intact throughout  Facial expressions full and symmetric  Tongue midline  Soft palate raises symmetrically  Hearing intact to finger rub  Patient has subjective tinnitus in the right ear  Motor Exam   Muscle bulk: normal  Overall muscle tone: normal  Right arm pronator drift: absent  Left arm pronator drift: absent  5/5 strength in bilateral upper and lower extremities  Sensory Exam   Sensation to light touch, temperature, and vibration intact throughout  Gait, Coordination, and Reflexes     Gait  Gait: (Deferred for patient's safety)  Patient became dizzy upon sitting up in bed  No ataxia with finger to nose bilaterally  No resting or action tremor  No ankle clonus bilaterally  Diminished reflexes throughout       Lab Results:   I have personally reviewed pertinent reports    , CBC:   Results from last 7 days   Lab Units 11/05/22  1703   WBC Thousand/uL 6 89   RBC Million/uL 4 16   HEMOGLOBIN g/dL 13 3   HEMATOCRIT % 40 1   MCV fL 96   PLATELETS Thousands/uL 235   , BMP/CMP:   Results from last 7 days   Lab Units 11/05/22  1703   SODIUM mmol/L 144   POTASSIUM mmol/L 3 7   CHLORIDE mmol/L 107   CO2 mmol/L 31   BUN mg/dL 28*   CREATININE mg/dL 0 62   CALCIUM mg/dL 9 1   AST U/L 13   ALT U/L 24   ALK PHOS U/L 46   EGFR ml/min/1 73sq m 86   , Vitamin B12:   , HgBA1C:   Results from last 7 days   Lab Units 11/06/22  0436   HEMOGLOBIN A1C % 5 6   , TSH: Results from last 7 days   Lab Units 11/05/22  1703   TSH 3RD GENERATON uIU/mL 0 859   , Coagulation:   , Lipid Profile:   Results from last 7 days   Lab Units 11/06/22  0436   HDL mg/dL 75   LDL CALC mg/dL 92   TRIGLYCERIDES mg/dL 95   , Ammonia:   , Urinalysis:   Results from last 7 days   Lab Units 11/05/22  1848   COLOR UA  Yellow   CLARITY UA  Clear   SPEC GRAV UA  1 025   PH UA  7 0   LEUKOCYTES UA  Small*   NITRITE UA  Negative   GLUCOSE UA mg/dl Negative   KETONES UA mg/dl Negative   BILIRUBIN UA  Negative   BLOOD UA  Negative   , Drug Screen:   , Medication Drug Levels:       Invalid input(s): CARBAMAZEPINE,  PHENOBARB, LACOSAMIDE, OXCARBAZEPINE  Imaging Studies: I have personally reviewed pertinent reports  and I have personally reviewed pertinent films in PACS  EKG, Pathology, and Other Studies: I have personally reviewed pertinent reports  and I have personally reviewed pertinent films in PACS  VTE Prophylaxis: Sequential compression device (Venodyne)  and Enoxaparin (Lovenox)    Code Status: Level 1 - Full Code  Advance Directive and Living Will:      Power of :    POLST:      Counseling / Coordination of Care  Total time spent today 57 minutes  Greater than 50% of total time was spent with the patient and/or family counseling and/or coordination of care  A description of the counseling/coordination of care:  Patient was seen and evaluated  Discussed with attending  Chart reviewed thoroughly including laboratory and imaging studies    Plan of care discussed with patient and primary team

## 2022-11-07 NOTE — PLAN OF CARE
Problem: Potential for Falls  Goal: Patient will remain free of falls  Description: INTERVENTIONS:  - Educate patient/family on patient safety including physical limitations  - Instruct patient to call for assistance with activity   - Consult OT/PT to assist with strengthening/mobility   - Keep Call bell within reach  - Keep bed low and locked with side rails adjusted as appropriate  - Keep care items and personal belongings within reach  - Initiate and maintain comfort rounds  - Make Fall Risk Sign visible to staff  - Offer Toileting every 2 Hours, in advance of need  - Initiate/Maintain bed alarm  - Obtain necessary fall risk management equipment: bed alarm   - Apply yellow socks and bracelet for high fall risk patients  - Consider moving patient to room near nurses station  Outcome: Progressing     Problem: PAIN - ADULT  Goal: Verbalizes/displays adequate comfort level or baseline comfort level  Description: Interventions:  - Encourage patient to monitor pain and request assistance  - Assess pain using appropriate pain scale  - Administer analgesics based on type and severity of pain and evaluate response  - Implement non-pharmacological measures as appropriate and evaluate response  - Consider cultural and social influences on pain and pain management  - Notify physician/advanced practitioner if interventions unsuccessful or patient reports new pain  Outcome: Progressing     Problem: INFECTION - ADULT  Goal: Absence or prevention of progression during hospitalization  Description: INTERVENTIONS:  - Assess and monitor for signs and symptoms of infection  - Monitor lab/diagnostic results  - Monitor all insertion sites, i e  indwelling lines, tubes, and drains  - Monitor endotracheal if appropriate and nasal secretions for changes in amount and color  - [place] appropriate cooling/warming therapies per order  - Administer medications as ordered  - Instruct and encourage patient and family to use good hand hygiene technique  - Identify and instruct in appropriate isolation precautions for identified infection/condition  Outcome: Progressing  Goal: Absence of fever/infection during neutropenic period  Description: INTERVENTIONS:  - Monitor WBC    Outcome: Progressing     Problem: SAFETY ADULT  Goal: Patient will remain free of falls  Description: INTERVENTIONS:  - Educate patient/family on patient safety including physical limitations  - Instruct patient to call for assistance with activity   - Consult OT/PT to assist with strengthening/mobility   - Keep Call bell within reach  - Keep bed low and locked with side rails adjusted as appropriate  - Keep care items and personal belongings within reach  - Initiate and maintain comfort rounds  - Make Fall Risk Sign visible to staff  - Offer Toileting every 2 Hours, in advance of need  - Initiate/Maintain bed alarm  - Obtain necessary fall risk management equipment: bed alarm   - Apply yellow socks and bracelet for high fall risk patients  - Consider moving patient to room near nurses station  Outcome: Progressing  Goal: Maintain or return to baseline ADL function  Description: INTERVENTIONS:  -  Assess patient's ability to carry out ADLs; assess patient's baseline for ADL function and identify physical deficits which impact ability to perform ADLs (bathing, care of mouth/teeth, toileting, grooming, dressing, etc )  - Assess/evaluate cause of self-care deficits   - Assess range of motion  - Assess patient's mobility; develop plan if impaired  - Assess patient's need for assistive devices and provide as appropriate  - Encourage maximum independence but intervene and supervise when necessary  - Involve family in performance of ADLs  - Assess for home care needs following discharge   - Consider OT consult to assist with ADL evaluation and planning for discharge  - Provide patient education as appropriate  Outcome: Progressing  Goal: Maintains/Returns to pre admission functional level  Description: INTERVENTIONS:  - Perform BMAT or MOVE assessment daily    - Set and communicate daily mobility goal to care team and patient/family/caregiver  - Collaborate with rehabilitation services on mobility goals if consulted  - Perform Range of Motion 4 times a day  - Reposition patient every 2 hours  - Dangle patient 4 times a day  - Stand patient 4 times a day  - Ambulate patient 4 times a day  - Out of bed to chair 4 times a day   - Out of bed for meals 4 times a day  - Out of bed for toileting  - Record patient progress and toleration of activity level   Outcome: Progressing     Problem: DISCHARGE PLANNING  Goal: Discharge to home or other facility with appropriate resources  Description: INTERVENTIONS:  - Identify barriers to discharge w/patient and caregiver  - Arrange for needed discharge resources and transportation as appropriate  - Identify discharge learning needs (meds, wound care, etc )  - Arrange for interpretive services to assist at discharge as needed  - Refer to Case Management Department for coordinating discharge planning if the patient needs post-hospital services based on physician/advanced practitioner order or complex needs related to functional status, cognitive ability, or social support system  Outcome: Progressing     Problem: Knowledge Deficit  Goal: Patient/family/caregiver demonstrates understanding of disease process, treatment plan, medications, and discharge instructions  Description: Complete learning assessment and assess knowledge base    Interventions:  - Provide teaching at level of understanding  - Provide teaching via preferred learning methods  Outcome: Progressing

## 2022-11-07 NOTE — PLAN OF CARE
Problem: Potential for Falls  Goal: Patient will remain free of falls  Description: INTERVENTIONS:  - Educate patient/family on patient safety including physical limitations  - Instruct patient to call for assistance with activity   - Consult OT/PT to assist with strengthening/mobility   - Keep Call bell within reach  - Keep bed low and locked with side rails adjusted as appropriate  - Keep care items and personal belongings within reach  - Initiate and maintain comfort rounds  - Make Fall Risk Sign visible to staff  - Offer Toileting every 2 Hours, in advance of need  - Initiate/Maintain alarm  - Obtain necessary fall risk management equipment: alarms  - Apply yellow socks and bracelet for high fall risk patients  - Consider moving patient to room near nurses station  Outcome: Progressing     Problem: PAIN - ADULT  Goal: Verbalizes/displays adequate comfort level or baseline comfort level  Description: Interventions:  - Encourage patient to monitor pain and request assistance  - Assess pain using appropriate pain scale  - Administer analgesics based on type and severity of pain and evaluate response  - Implement non-pharmacological measures as appropriate and evaluate response  - Consider cultural and social influences on pain and pain management  - Notify physician/advanced practitioner if interventions unsuccessful or patient reports new pain  Outcome: Progressing     Problem: INFECTION - ADULT  Goal: Absence or prevention of progression during hospitalization  Description: INTERVENTIONS:  - Assess and monitor for signs and symptoms of infection  - Monitor lab/diagnostic results  - Monitor all insertion sites, i e  indwelling lines, tubes, and drains  - Monitor endotracheal if appropriate and nasal secretions for changes in amount and color  - Only appropriate cooling/warming therapies per order  - Administer medications as ordered  - Instruct and encourage patient and family to use good hand hygiene technique  - Identify and instruct in appropriate isolation precautions for identified infection/condition  Outcome: Progressing  Goal: Absence of fever/infection during neutropenic period  Description: INTERVENTIONS:  - Monitor WBC    Outcome: Progressing     Problem: SAFETY ADULT  Goal: Patient will remain free of falls  Description: INTERVENTIONS:  - Educate patient/family on patient safety including physical limitations  - Instruct patient to call for assistance with activity   - Consult OT/PT to assist with strengthening/mobility   - Keep Call bell within reach  - Keep bed low and locked with side rails adjusted as appropriate  - Keep care items and personal belongings within reach  - Initiate and maintain comfort rounds  - Make Fall Risk Sign visible to staff  - Offer Toileting every 2 Hours, in advance of need  - Initiate/Maintain alarm  - Obtain necessary fall risk management equipment: alarms  - Apply yellow socks and bracelet for high fall risk patients  - Consider moving patient to room near nurses station  Outcome: Progressing  Goal: Maintain or return to baseline ADL function  Description: INTERVENTIONS:  -  Assess patient's ability to carry out ADLs; assess patient's baseline for ADL function and identify physical deficits which impact ability to perform ADLs (bathing, care of mouth/teeth, toileting, grooming, dressing, etc )  - Assess/evaluate cause of self-care deficits   - Assess range of motion  - Assess patient's mobility; develop plan if impaired  - Assess patient's need for assistive devices and provide as appropriate  - Encourage maximum independence but intervene and supervise when necessary  - Involve family in performance of ADLs  - Assess for home care needs following discharge   - Consider OT consult to assist with ADL evaluation and planning for discharge  - Provide patient education as appropriate  Outcome: Progressing  Goal: Maintains/Returns to pre admission functional level  Description: INTERVENTIONS:  - Perform BMAT or MOVE assessment daily    - Set and communicate daily mobility goal to care team and patient/family/caregiver  - Collaborate with rehabilitation services on mobility goals if consulted  - Perform Range of Motion 2 times a day  - Reposition patient every 2 hours  - Dangle patient 2 times a day  - Stand patient 2 times a day  - Ambulate patient 2 times a day  - Out of bed to chair 2 times a day   - Out of bed for meals 3 times a day  - Out of bed for toileting  - Record patient progress and toleration of activity level   Outcome: Progressing     Problem: DISCHARGE PLANNING  Goal: Discharge to home or other facility with appropriate resources  Description: INTERVENTIONS:  - Identify barriers to discharge w/patient and caregiver  - Arrange for needed discharge resources and transportation as appropriate  - Identify discharge learning needs (meds, wound care, etc )  - Arrange for interpretive services to assist at discharge as needed  - Refer to Case Management Department for coordinating discharge planning if the patient needs post-hospital services based on physician/advanced practitioner order or complex needs related to functional status, cognitive ability, or social support system  Outcome: Progressing     Problem: Knowledge Deficit  Goal: Patient/family/caregiver demonstrates understanding of disease process, treatment plan, medications, and discharge instructions  Description: Complete learning assessment and assess knowledge base    Interventions:  - Provide teaching at level of understanding  - Provide teaching via preferred learning methods  Outcome: Progressing

## 2022-11-07 NOTE — PLAN OF CARE
Problem: Potential for Falls  Goal: Patient will remain free of falls  Description: INTERVENTIONS:  - Educate patient/family on patient safety including physical limitations  - Instruct patient to call for assistance with activity   - Consult OT/PT to assist with strengthening/mobility   - Keep Call bell within reach  - Keep bed low and locked with side rails adjusted as appropriate  - Keep care items and personal belongings within reach  - Initiate and maintain comfort rounds  - Make Fall Risk Sign visible to staff  - Offer Toileting every 2 Hours, in advance of need  - Initiate/Maintain alarm  - Obtain necessary fall risk management equipment: alarms  - Apply yellow socks and bracelet for high fall risk patients  - Consider moving patient to room near nurses station  11/7/2022 1616 by Cecil Collado RN  Outcome: Adequate for Discharge  11/7/2022 1239 by Cecil Collado RN  Outcome: Progressing     Problem: PAIN - ADULT  Goal: Verbalizes/displays adequate comfort level or baseline comfort level  Description: Interventions:  - Encourage patient to monitor pain and request assistance  - Assess pain using appropriate pain scale  - Administer analgesics based on type and severity of pain and evaluate response  - Implement non-pharmacological measures as appropriate and evaluate response  - Consider cultural and social influences on pain and pain management  - Notify physician/advanced practitioner if interventions unsuccessful or patient reports new pain  11/7/2022 1616 by Cecil Collado RN  Outcome: Adequate for Discharge  11/7/2022 1239 by Cecil Collado RN  Outcome: Progressing     Problem: INFECTION - ADULT  Goal: Absence or prevention of progression during hospitalization  Description: INTERVENTIONS:  - Assess and monitor for signs and symptoms of infection  - Monitor lab/diagnostic results  - Monitor all insertion sites, i e  indwelling lines, tubes, and drains  - Monitor endotracheal if appropriate and nasal secretions for changes in amount and color  - [place] appropriate cooling/warming therapies per order  - Administer medications as ordered  - Instruct and encourage patient and family to use good hand hygiene technique  - Identify and instruct in appropriate isolation precautions for identified infection/condition  11/7/2022 1616 by Cherelle Moreau RN  Outcome: Adequate for Discharge  11/7/2022 1239 by Cherelle Moreau RN  Outcome: Progressing  Goal: Absence of fever/infection during neutropenic period  Description: INTERVENTIONS:  - Monitor WBC    11/7/2022 1616 by Cherelle Moreau RN  Outcome: Adequate for Discharge  11/7/2022 1239 by Cherelle Moreau RN  Outcome: Progressing     Problem: SAFETY ADULT  Goal: Patient will remain free of falls  Description: INTERVENTIONS:  - Educate patient/family on patient safety including physical limitations  - Instruct patient to call for assistance with activity   - Consult OT/PT to assist with strengthening/mobility   - Keep Call bell within reach  - Keep bed low and locked with side rails adjusted as appropriate  - Keep care items and personal belongings within reach  - Initiate and maintain comfort rounds  - Make Fall Risk Sign visible to staff  - Offer Toileting every 2 Hours, in advance of need  - Initiate/Maintain alarm  - Obtain necessary fall risk management equipment: alarms  - Apply yellow socks and bracelet for high fall risk patients  - Consider moving patient to room near nurses station  11/7/2022 1616 by Cherelle Moreau RN  Outcome: Adequate for Discharge  11/7/2022 1239 by Cherelle Moreau RN  Outcome: Progressing  Goal: Maintain or return to baseline ADL function  Description: INTERVENTIONS:  -  Assess patient's ability to carry out ADLs; assess patient's baseline for ADL function and identify physical deficits which impact ability to perform ADLs (bathing, care of mouth/teeth, toileting, grooming, dressing, etc )  - Assess/evaluate cause of self-care deficits   - Assess range of motion  - Assess patient's mobility; develop plan if impaired  - Assess patient's need for assistive devices and provide as appropriate  - Encourage maximum independence but intervene and supervise when necessary  - Involve family in performance of ADLs  - Assess for home care needs following discharge   - Consider OT consult to assist with ADL evaluation and planning for discharge  - Provide patient education as appropriate  11/7/2022 1616 by Gus Braden RN  Outcome: Adequate for Discharge  11/7/2022 1239 by Gus Braden RN  Outcome: Progressing  Goal: Maintains/Returns to pre admission functional level  Description: INTERVENTIONS:  - Perform BMAT or MOVE assessment daily    - Set and communicate daily mobility goal to care team and patient/family/caregiver  - Collaborate with rehabilitation services on mobility goals if consulted  - Perform Range of Motion 2 times a day  - Reposition patient every 2 hours    - Dangle patient 2 times a day  - Stand patient 2 times a day  - Ambulate patient 2 times a day  - Out of bed to chair 3 times a day   - Out of bed for meals 3 times a day  - Out of bed for toileting  - Record patient progress and toleration of activity level   11/7/2022 1616 by Gus Braden RN  Outcome: Adequate for Discharge  11/7/2022 1239 by Gus Braden RN  Outcome: Progressing     Problem: DISCHARGE PLANNING  Goal: Discharge to home or other facility with appropriate resources  Description: INTERVENTIONS:  - Identify barriers to discharge w/patient and caregiver  - Arrange for needed discharge resources and transportation as appropriate  - Identify discharge learning needs (meds, wound care, etc )  - Arrange for interpretive services to assist at discharge as needed  - Refer to Case Management Department for coordinating discharge planning if the patient needs post-hospital services based on physician/advanced practitioner order or complex needs related to functional status, cognitive ability, or social support system  11/7/2022 1616 by Carla Gregory RN  Outcome: Adequate for Discharge  11/7/2022 1239 by Carla Gregory RN  Outcome: Progressing     Problem: Knowledge Deficit  Goal: Patient/family/caregiver demonstrates understanding of disease process, treatment plan, medications, and discharge instructions  Description: Complete learning assessment and assess knowledge base    Interventions:  - Provide teaching at level of understanding  - Provide teaching via preferred learning methods  11/7/2022 1616 by Carla Gregory RN  Outcome: Adequate for Discharge  11/7/2022 1239 by Carla Gregory RN  Outcome: Progressing

## 2022-11-08 ENCOUNTER — TRANSITIONAL CARE MANAGEMENT (OUTPATIENT)
Dept: FAMILY MEDICINE CLINIC | Facility: CLINIC | Age: 77
End: 2022-11-08

## 2022-11-21 ENCOUNTER — APPOINTMENT (OUTPATIENT)
Dept: LAB | Facility: HOSPITAL | Age: 77
End: 2022-11-21

## 2022-11-21 ENCOUNTER — TELEPHONE (OUTPATIENT)
Age: 77
End: 2022-11-21

## 2022-11-21 DIAGNOSIS — I10 ESSENTIAL HYPERTENSION, MALIGNANT: ICD-10-CM

## 2022-11-21 DIAGNOSIS — E78.5 HYPERLIPIDEMIA, UNSPECIFIED HYPERLIPIDEMIA TYPE: ICD-10-CM

## 2022-11-21 DIAGNOSIS — E03.9 MYXEDEMA HEART DISEASE: ICD-10-CM

## 2022-11-21 DIAGNOSIS — E55.9 VITAMIN D DEFICIENCY: ICD-10-CM

## 2022-11-21 DIAGNOSIS — E53.8 VITAMIN B 12 DEFICIENCY: ICD-10-CM

## 2022-11-21 DIAGNOSIS — I51.9 MYXEDEMA HEART DISEASE: ICD-10-CM

## 2022-11-21 LAB
25(OH)D3 SERPL-MCNC: 31.5 NG/ML (ref 30–100)
ALBUMIN SERPL BCP-MCNC: 3.5 G/DL (ref 3.5–5)
ALP SERPL-CCNC: 52 U/L (ref 46–116)
ALT SERPL W P-5'-P-CCNC: 30 U/L (ref 12–78)
ANION GAP SERPL CALCULATED.3IONS-SCNC: 3 MMOL/L (ref 4–13)
AST SERPL W P-5'-P-CCNC: 20 U/L (ref 5–45)
BASOPHILS # BLD AUTO: 0.05 THOUSANDS/ÂΜL (ref 0–0.1)
BASOPHILS NFR BLD AUTO: 1 % (ref 0–1)
BILIRUB SERPL-MCNC: 1.08 MG/DL (ref 0.2–1)
BUN SERPL-MCNC: 31 MG/DL (ref 5–25)
CALCIUM SERPL-MCNC: 9.3 MG/DL (ref 8.3–10.1)
CHLORIDE SERPL-SCNC: 110 MMOL/L (ref 96–108)
CHOLEST SERPL-MCNC: 194 MG/DL
CO2 SERPL-SCNC: 28 MMOL/L (ref 21–32)
CREAT SERPL-MCNC: 0.76 MG/DL (ref 0.6–1.3)
EOSINOPHIL # BLD AUTO: 0.13 THOUSAND/ÂΜL (ref 0–0.61)
EOSINOPHIL NFR BLD AUTO: 2 % (ref 0–6)
ERYTHROCYTE [DISTWIDTH] IN BLOOD BY AUTOMATED COUNT: 12.6 % (ref 11.6–15.1)
GFR SERPL CREATININE-BSD FRML MDRD: 74 ML/MIN/1.73SQ M
GLUCOSE P FAST SERPL-MCNC: 88 MG/DL (ref 65–99)
HCT VFR BLD AUTO: 44.6 % (ref 34.8–46.1)
HDLC SERPL-MCNC: 79 MG/DL
HGB BLD-MCNC: 14.5 G/DL (ref 11.5–15.4)
IMM GRANULOCYTES # BLD AUTO: 0 THOUSAND/UL (ref 0–0.2)
IMM GRANULOCYTES NFR BLD AUTO: 0 % (ref 0–2)
LDLC SERPL CALC-MCNC: 94 MG/DL (ref 0–100)
LYMPHOCYTES # BLD AUTO: 1.7 THOUSANDS/ÂΜL (ref 0.6–4.47)
LYMPHOCYTES NFR BLD AUTO: 31 % (ref 14–44)
MCH RBC QN AUTO: 31.7 PG (ref 26.8–34.3)
MCHC RBC AUTO-ENTMCNC: 32.5 G/DL (ref 31.4–37.4)
MCV RBC AUTO: 98 FL (ref 82–98)
MONOCYTES # BLD AUTO: 0.57 THOUSAND/ÂΜL (ref 0.17–1.22)
MONOCYTES NFR BLD AUTO: 11 % (ref 4–12)
NEUTROPHILS # BLD AUTO: 2.96 THOUSANDS/ÂΜL (ref 1.85–7.62)
NEUTS SEG NFR BLD AUTO: 55 % (ref 43–75)
NONHDLC SERPL-MCNC: 115 MG/DL
NRBC BLD AUTO-RTO: 0 /100 WBCS
PLATELET # BLD AUTO: 263 THOUSANDS/UL (ref 149–390)
PMV BLD AUTO: 11 FL (ref 8.9–12.7)
POTASSIUM SERPL-SCNC: 4 MMOL/L (ref 3.5–5.3)
PROT SERPL-MCNC: 7.6 G/DL (ref 6.4–8.4)
RBC # BLD AUTO: 4.57 MILLION/UL (ref 3.81–5.12)
SODIUM SERPL-SCNC: 141 MMOL/L (ref 135–147)
T4 FREE SERPL-MCNC: 1 NG/DL (ref 0.76–1.46)
TRIGL SERPL-MCNC: 104 MG/DL
TSH SERPL DL<=0.05 MIU/L-ACNC: 0.61 UIU/ML (ref 0.45–4.5)
VIT B12 SERPL-MCNC: 562 PG/ML (ref 100–900)
WBC # BLD AUTO: 5.41 THOUSAND/UL (ref 4.31–10.16)

## 2022-11-21 NOTE — TELEPHONE ENCOUNTER
Spoke with patient's daughter, Chris White, who feels memory assessment is not needed at this time  Will call back if there is a future need

## 2022-12-14 ENCOUNTER — APPOINTMENT (OUTPATIENT)
Dept: RADIOLOGY | Age: 77
End: 2022-12-14

## 2022-12-14 ENCOUNTER — OFFICE VISIT (OUTPATIENT)
Dept: URGENT CARE | Age: 77
End: 2022-12-14

## 2022-12-14 VITALS
HEART RATE: 68 BPM | OXYGEN SATURATION: 99 % | SYSTOLIC BLOOD PRESSURE: 126 MMHG | RESPIRATION RATE: 18 BRPM | DIASTOLIC BLOOD PRESSURE: 84 MMHG | TEMPERATURE: 98.2 F

## 2022-12-14 DIAGNOSIS — M79.631 PAIN AND SWELLING OF RIGHT FOREARM: ICD-10-CM

## 2022-12-14 DIAGNOSIS — M25.522 LEFT ELBOW PAIN: ICD-10-CM

## 2022-12-14 DIAGNOSIS — M25.521 RIGHT ELBOW PAIN: ICD-10-CM

## 2022-12-14 DIAGNOSIS — M79.89 PAIN AND SWELLING OF RIGHT FOREARM: ICD-10-CM

## 2022-12-14 DIAGNOSIS — M79.89 PAIN AND SWELLING OF LEFT FOREARM: ICD-10-CM

## 2022-12-14 DIAGNOSIS — L03.114 CELLULITIS OF LEFT ARM: Primary | ICD-10-CM

## 2022-12-14 DIAGNOSIS — M79.632 PAIN AND SWELLING OF LEFT FOREARM: ICD-10-CM

## 2022-12-14 RX ORDER — CEPHALEXIN 500 MG/1
500 CAPSULE ORAL EVERY 12 HOURS SCHEDULED
Qty: 14 CAPSULE | Refills: 0 | Status: SHIPPED | OUTPATIENT
Start: 2022-12-14 | End: 2022-12-21

## 2022-12-14 NOTE — PROGRESS NOTES
[facility]        NAME: Norma Stern is a 76 y o  female  : 1945    MRN: [id number]  DATE: December 15, 2022  TIME: 10:32 AM    Assessment and Orders   Cellulitis of left arm [L03 114]  1  Cellulitis of left arm  cephalexin (KEFLEX) 500 mg capsule      2  Left elbow pain  CANCELED: XR elbow 2 vw right      3  Pain and swelling of left forearm  CANCELED: XR forearm 2 vw right            Plan and Discussion      Symptoms and exam consistent with cellulitis of the left forearm  X-ray of left elbow and left forearm did not reveal any acute osseous abnormalities  Will treat with antibiotics  Should infection get worse as evidenced by expanding erythema or acute onset of fevers, patient should be seen in the emergency room for blood work and IV antibiotics  Risks and benefits discussed  Patient understands and agrees with the plan  Follow up with PCP  Chief Complaint     Chief Complaint   Patient presents with   • Arm Pain     Bumped into fridge two days ago with left arm  Last PM stared swelling  Redness on inner side of arm  Not itching         History of Present Illness       HPI  Patient is a 75-year-old female who presents with left arm pain and swelling  Patient states that she bumped her arm against the refrigerator door a few days ago but yesterday notes that the skin on her forearm is hot and very tender to the touch  There are no breaks in the skin  She states she used an ice pack on her arm after she hit it on the refrigerator, however the iced was not in direct contact with her skin  She describes the pain as a burning  From yesterday to today it seems like the area of redness has gotten bigger which prompted her visit today  She has had no fevers      Review of Systems   Review of Systems  As stated in HPI    Current Medications       Current Outpatient Medications:   •  acetaminophen (TYLENOL) 650 mg CR tablet, , Disp: , Rfl:   •  atorvastatin (LIPITOR) 10 mg tablet, TAKE 1 TABLET EVERY DAY, Disp: 90 tablet, Rfl: 1  •  bimatoprost (LUMIGAN) 0 01 % ophthalmic drops, Administer into the left eye , Disp: , Rfl:   •  calcium carbonate-vitamin D 250 mg-3 125 mcg per tablet, Take 2 tablets by mouth daily, Disp: , Rfl:   •  cephalexin (KEFLEX) 500 mg capsule, Take 1 capsule (500 mg total) by mouth every 12 (twelve) hours for 7 days, Disp: 14 capsule, Rfl: 0  •  levothyroxine 112 mcg tablet, TAKE 1 TABLET EVERY DAY, Disp: 90 tablet, Rfl: 1  •  losartan (COZAAR) 50 mg tablet, Take 1 tablet (50 mg total) by mouth daily Do not start before November 8, 2022 , Disp: 30 tablet, Rfl: 0  •  Lutein 20 MG TABS, 1 tablet by mouth daily, Disp: , Rfl:   •  meclizine (ANTIVERT) 25 mg tablet, Take 1 tablet (25 mg total) by mouth every 8 (eight) hours as needed for dizziness, Disp: 30 tablet, Rfl: 0  •  pravastatin (PRAVACHOL) 40 mg tablet, Take by mouth Daily, Disp: , Rfl:   •  ranibizumab (Lucentis) 0 3 mg/0 05mL, 0 3 mg by Intravitreal route once , Disp: , Rfl:   •  venlafaxine (EFFEXOR-XR) 75 mg 24 hr capsule, TAKE 1 CAPSULE EVERY DAY, Disp: 90 capsule, Rfl: 1    Current Allergies     Allergies as of 12/14/2022 - Reviewed 12/14/2022   Allergen Reaction Noted   • Sulfa antibiotics Rash 06/01/2016            The following portions of the patient's history were reviewed and updated as appropriate: allergies, current medications, past family history, past medical history, past social history, past surgical history and problem list      Past Medical History:   Diagnosis Date   • Depression    • Diverticulosis    • Glaucoma 2015    medication   • Hemorrhoids 12/2009   • Hyperlipidemia    • Hypertension    • Hypothyroidism        Past Surgical History:   Procedure Laterality Date   • CARPAL TUNNEL RELEASE Bilateral     2017, 2018   • CATARACT EXTRACTION Left 03/03/2021   • HYSTERECTOMY      age 62   • OOPHORECTOMY Bilateral age 62   • TONSILLECTOMY         Family History   Problem Relation Age of Onset • Hypertension Mother    • Diabetes Father    • Brain cancer Sister    • No Known Problems Daughter    • No Known Problems Maternal Grandmother    • No Known Problems Maternal Grandfather    • No Known Problems Paternal Grandmother    • No Known Problems Paternal Grandfather    • No Known Problems Sister    • No Known Problems Sister    • No Known Problems Sister    • No Known Problems Daughter    • No Known Problems Daughter    • No Known Problems Paternal Aunt    • No Known Problems Paternal Aunt    • No Known Problems Paternal Aunt    • No Known Problems Paternal Aunt          Medications have been verified  Objective   /84   Pulse 68   Temp 98 2 °F (36 8 °C)   Resp 18   SpO2 99%   No LMP recorded  Patient is postmenopausal        Physical Exam     Physical Exam  Pulmonary:      Effort: Pulmonary effort is normal    Musculoskeletal:      Left elbow: Normal       Left forearm: Swelling and tenderness present  Left wrist: Normal    Skin:     Findings: Erythema present  No lesion, rash or wound  Comments: Area of erythema  No breaks in the skin  No bruising  Tender to touch   Neurological:      General: No focal deficit present  Mental Status: She is alert and oriented to person, place, and time     Psychiatric:         Mood and Affect: Mood normal          Behavior: Behavior normal                Janie Lara DO

## 2022-12-16 ENCOUNTER — RA CDI HCC (OUTPATIENT)
Dept: OTHER | Facility: HOSPITAL | Age: 77
End: 2022-12-16

## 2023-06-02 ENCOUNTER — HOSPITAL ENCOUNTER (OUTPATIENT)
Dept: RADIOLOGY | Facility: HOSPITAL | Age: 78
Discharge: HOME | End: 2023-06-02

## 2023-06-02 DIAGNOSIS — M19.90 OSTEOARTHRITIS, UNSPECIFIED OSTEOARTHRITIS TYPE, UNSPECIFIED SITE: ICD-10-CM

## 2023-06-02 DIAGNOSIS — M25.562 LEFT KNEE PAIN, UNSPECIFIED CHRONICITY: ICD-10-CM

## 2023-06-02 DIAGNOSIS — M19.90 SENILE ARTHRITIS: ICD-10-CM

## 2023-06-08 ENCOUNTER — APPOINTMENT (OUTPATIENT)
Dept: LAB | Facility: HOSPITAL | Age: 78
End: 2023-06-08
Payer: MEDICARE

## 2023-06-08 ENCOUNTER — APPOINTMENT (OUTPATIENT)
Dept: LAB | Facility: HOSPITAL | Age: 78
End: 2023-06-08

## 2023-06-08 DIAGNOSIS — I10 ESSENTIAL HYPERTENSION, BENIGN: ICD-10-CM

## 2023-06-08 DIAGNOSIS — E53.8 BIOTIN-(PROPIONYL-COA-CARBOXYLASE) LIGASE DEFICIENCY: ICD-10-CM

## 2023-06-08 DIAGNOSIS — E78.00 PURE HYPERCHOLESTEROLEMIA: ICD-10-CM

## 2023-06-08 DIAGNOSIS — E03.9 HYPOTHYROIDISM, UNSPECIFIED TYPE: ICD-10-CM

## 2023-06-08 DIAGNOSIS — E55.9 VITAMIN D DEFICIENCY: ICD-10-CM

## 2023-06-08 LAB
25(OH)D3 SERPL-MCNC: 37.7 NG/ML (ref 30–100)
ALBUMIN SERPL BCP-MCNC: 3.8 G/DL (ref 3.5–5)
ALP SERPL-CCNC: 40 U/L (ref 34–104)
ALT SERPL W P-5'-P-CCNC: 16 U/L (ref 7–52)
ANION GAP SERPL CALCULATED.3IONS-SCNC: 6 MMOL/L (ref 4–13)
AST SERPL W P-5'-P-CCNC: 16 U/L (ref 13–39)
BASOPHILS # BLD AUTO: 0.04 THOUSANDS/ÂΜL (ref 0–0.1)
BASOPHILS NFR BLD AUTO: 1 % (ref 0–1)
BILIRUB SERPL-MCNC: 0.91 MG/DL (ref 0.2–1)
BILIRUB UR QL STRIP: NEGATIVE
BUN SERPL-MCNC: 21 MG/DL (ref 5–25)
CALCIUM SERPL-MCNC: 8.8 MG/DL (ref 8.4–10.2)
CHLORIDE SERPL-SCNC: 109 MMOL/L (ref 96–108)
CHOLEST SERPL-MCNC: 186 MG/DL
CLARITY UR: CLEAR
CO2 SERPL-SCNC: 26 MMOL/L (ref 21–32)
COLOR UR: NORMAL
CREAT SERPL-MCNC: 0.63 MG/DL (ref 0.6–1.3)
EOSINOPHIL # BLD AUTO: 0.21 THOUSAND/ÂΜL (ref 0–0.61)
EOSINOPHIL NFR BLD AUTO: 4 % (ref 0–6)
ERYTHROCYTE [DISTWIDTH] IN BLOOD BY AUTOMATED COUNT: 13.1 % (ref 11.6–15.1)
GFR SERPL CREATININE-BSD FRML MDRD: 84 ML/MIN/1.73SQ M
GLUCOSE P FAST SERPL-MCNC: 79 MG/DL (ref 65–99)
GLUCOSE UR STRIP-MCNC: NEGATIVE MG/DL
HCT VFR BLD AUTO: 43 % (ref 34.8–46.1)
HDLC SERPL-MCNC: 77 MG/DL
HGB BLD-MCNC: 13.7 G/DL (ref 11.5–15.4)
HGB UR QL STRIP.AUTO: NEGATIVE
IMM GRANULOCYTES # BLD AUTO: 0.01 THOUSAND/UL (ref 0–0.2)
IMM GRANULOCYTES NFR BLD AUTO: 0 % (ref 0–2)
KETONES UR STRIP-MCNC: NEGATIVE MG/DL
LDLC SERPL CALC-MCNC: 90 MG/DL (ref 0–100)
LEUKOCYTE ESTERASE UR QL STRIP: NEGATIVE
LYMPHOCYTES # BLD AUTO: 1.63 THOUSANDS/ÂΜL (ref 0.6–4.47)
LYMPHOCYTES NFR BLD AUTO: 32 % (ref 14–44)
MCH RBC QN AUTO: 31.2 PG (ref 26.8–34.3)
MCHC RBC AUTO-ENTMCNC: 31.9 G/DL (ref 31.4–37.4)
MCV RBC AUTO: 98 FL (ref 82–98)
MONOCYTES # BLD AUTO: 0.6 THOUSAND/ÂΜL (ref 0.17–1.22)
MONOCYTES NFR BLD AUTO: 12 % (ref 4–12)
NEUTROPHILS # BLD AUTO: 2.68 THOUSANDS/ÂΜL (ref 1.85–7.62)
NEUTS SEG NFR BLD AUTO: 51 % (ref 43–75)
NITRITE UR QL STRIP: NEGATIVE
NONHDLC SERPL-MCNC: 109 MG/DL
NRBC BLD AUTO-RTO: 0 /100 WBCS
PH UR STRIP.AUTO: 6 [PH]
PLATELET # BLD AUTO: 253 THOUSANDS/UL (ref 149–390)
PMV BLD AUTO: 10.4 FL (ref 8.9–12.7)
POTASSIUM SERPL-SCNC: 4.3 MMOL/L (ref 3.5–5.3)
PROT SERPL-MCNC: 6.6 G/DL (ref 6.4–8.4)
PROT UR STRIP-MCNC: NEGATIVE MG/DL
RBC # BLD AUTO: 4.39 MILLION/UL (ref 3.81–5.12)
SODIUM SERPL-SCNC: 141 MMOL/L (ref 135–147)
SP GR UR STRIP.AUTO: 1.02 (ref 1–1.03)
TRIGL SERPL-MCNC: 94 MG/DL
TSH SERPL DL<=0.05 MIU/L-ACNC: 0.66 UIU/ML (ref 0.45–4.5)
UROBILINOGEN UR STRIP-ACNC: <2 MG/DL
VIT B12 SERPL-MCNC: 312 PG/ML (ref 180–914)
WBC # BLD AUTO: 5.17 THOUSAND/UL (ref 4.31–10.16)

## 2023-06-08 PROCEDURE — 80053 COMPREHEN METABOLIC PANEL: CPT

## 2023-06-08 PROCEDURE — 82306 VITAMIN D 25 HYDROXY: CPT

## 2023-06-08 PROCEDURE — 36415 COLL VENOUS BLD VENIPUNCTURE: CPT

## 2023-06-08 PROCEDURE — 81003 URINALYSIS AUTO W/O SCOPE: CPT

## 2023-06-08 PROCEDURE — 85025 COMPLETE CBC W/AUTO DIFF WBC: CPT

## 2023-06-08 PROCEDURE — 80061 LIPID PANEL: CPT

## 2023-06-08 PROCEDURE — 84443 ASSAY THYROID STIM HORMONE: CPT

## 2023-06-08 PROCEDURE — 82607 VITAMIN B-12: CPT

## 2023-06-15 ENCOUNTER — HOSPITAL ENCOUNTER (OUTPATIENT)
Dept: MAMMOGRAPHY | Facility: MEDICAL CENTER | Age: 78
Discharge: HOME | End: 2023-06-15
Payer: MEDICARE

## 2023-06-15 VITALS — WEIGHT: 152 LBS | BODY MASS INDEX: 27.97 KG/M2 | HEIGHT: 62 IN

## 2023-06-15 DIAGNOSIS — Z12.31 VISIT FOR SCREENING MAMMOGRAM: ICD-10-CM

## 2023-06-15 PROCEDURE — 77067 SCR MAMMO BI INCL CAD: CPT

## 2023-06-15 PROCEDURE — 77063 BREAST TOMOSYNTHESIS BI: CPT
